# Patient Record
Sex: FEMALE | Race: WHITE | NOT HISPANIC OR LATINO | Employment: FULL TIME | URBAN - METROPOLITAN AREA
[De-identification: names, ages, dates, MRNs, and addresses within clinical notes are randomized per-mention and may not be internally consistent; named-entity substitution may affect disease eponyms.]

---

## 2017-02-20 ENCOUNTER — TELEPHONE (OUTPATIENT)
Dept: INTERNAL MEDICINE | Facility: CLINIC | Age: 47
End: 2017-02-20

## 2017-02-20 DIAGNOSIS — Z00.00 ROUTINE GENERAL MEDICAL EXAMINATION AT A HEALTH CARE FACILITY: Primary | ICD-10-CM

## 2017-02-20 NOTE — TELEPHONE ENCOUNTER
----- Message from Russell Woods sent at 2/20/2017 12:50 PM CST -----  Contact: self   Doctor appointment and lab have been scheduled.  Please link lab orders to the lab appointment.  Date of doctor appointment:  06/14  Physical or EP:  Physical   Date of lab appointment:  06/09  Comments:

## 2017-02-21 ENCOUNTER — OFFICE VISIT (OUTPATIENT)
Dept: INTERNAL MEDICINE | Facility: CLINIC | Age: 47
End: 2017-02-21
Payer: COMMERCIAL

## 2017-02-21 VITALS
WEIGHT: 179.44 LBS | OXYGEN SATURATION: 98 % | DIASTOLIC BLOOD PRESSURE: 84 MMHG | BODY MASS INDEX: 30.63 KG/M2 | TEMPERATURE: 99 F | HEIGHT: 64 IN | SYSTOLIC BLOOD PRESSURE: 132 MMHG | HEART RATE: 89 BPM

## 2017-02-21 DIAGNOSIS — I10 ESSENTIAL HYPERTENSION: ICD-10-CM

## 2017-02-21 DIAGNOSIS — J20.9 ACUTE BRONCHITIS, UNSPECIFIED ORGANISM: Primary | ICD-10-CM

## 2017-02-21 PROCEDURE — 96372 THER/PROPH/DIAG INJ SC/IM: CPT | Mod: S$GLB,,, | Performed by: FAMILY MEDICINE

## 2017-02-21 PROCEDURE — 99214 OFFICE O/P EST MOD 30 MIN: CPT | Mod: 25,S$GLB,, | Performed by: FAMILY MEDICINE

## 2017-02-21 PROCEDURE — 3079F DIAST BP 80-89 MM HG: CPT | Mod: S$GLB,,, | Performed by: FAMILY MEDICINE

## 2017-02-21 PROCEDURE — 1160F RVW MEDS BY RX/DR IN RCRD: CPT | Mod: S$GLB,,, | Performed by: FAMILY MEDICINE

## 2017-02-21 PROCEDURE — 3075F SYST BP GE 130 - 139MM HG: CPT | Mod: S$GLB,,, | Performed by: FAMILY MEDICINE

## 2017-02-21 PROCEDURE — 99999 PR PBB SHADOW E&M-EST. PATIENT-LVL III: CPT | Mod: PBBFAC,,, | Performed by: FAMILY MEDICINE

## 2017-02-21 RX ORDER — PROMETHAZINE HYDROCHLORIDE AND DEXTROMETHORPHAN HYDROBROMIDE 6.25; 15 MG/5ML; MG/5ML
5 SYRUP ORAL EVERY 4 HOURS PRN
Qty: 240 ML | Refills: 0 | Status: SHIPPED | OUTPATIENT
Start: 2017-02-21 | End: 2017-03-03

## 2017-02-21 RX ORDER — TRIAMCINOLONE ACETONIDE 40 MG/ML
40 INJECTION, SUSPENSION INTRA-ARTICULAR; INTRAMUSCULAR
Status: COMPLETED | OUTPATIENT
Start: 2017-02-21 | End: 2017-02-21

## 2017-02-21 RX ORDER — AZITHROMYCIN 250 MG/1
TABLET, FILM COATED ORAL
Qty: 6 TABLET | Refills: 0 | Status: SHIPPED | OUTPATIENT
Start: 2017-02-21 | End: 2017-06-14 | Stop reason: ALTCHOICE

## 2017-02-21 RX ORDER — METHYLPREDNISOLONE 4 MG/1
TABLET ORAL
Qty: 1 PACKAGE | Refills: 1 | Status: SHIPPED | OUTPATIENT
Start: 2017-02-21 | End: 2017-03-14

## 2017-02-21 RX ADMIN — TRIAMCINOLONE ACETONIDE 40 MG: 40 INJECTION, SUSPENSION INTRA-ARTICULAR; INTRAMUSCULAR at 04:02

## 2017-02-21 NOTE — PROGRESS NOTES
Subjective:   Patient ID: Ruth Harper is a 46 y.o. female.    Chief Complaint: Sore Throat (coughing, sore throat; ; flu / strep going around)    45 yo female with essential hypertension here with sore throat and productive cough for about 2 weeks.    URI    This is a new problem. The current episode started 1 to 4 weeks ago. The problem has been gradually worsening. There has been no fever. The fever has been present for less than 1 day. Associated symptoms include chest pain, coughing, headaches, nausea, a plugged ear sensation, rhinorrhea, sinus pain, sneezing and a sore throat. Pertinent negatives include no abdominal pain, congestion, diarrhea, dysuria, ear pain, joint pain, joint swelling, neck pain, rash, swollen glands, vomiting or wheezing. She has tried acetaminophen for the symptoms. The treatment provided no relief.       Patient queried and denies any further complaints.    ALLERGIES AND MEDICATIONS: updated and reviewed.  Review of patient's allergies indicates:   Allergen Reactions    Penicillins Hives       Current Outpatient Prescriptions:     azithromycin (Z-ELISHA) 250 MG tablet, Take 2 tablets by mouth on day 1; Take 1 tablet by mouth on days 2-5, Disp: 6 tablet, Rfl: 0    losartan (COZAAR) 100 MG tablet, TAKE 1 TABLET BY MOUTH DAILY, Disp: 90 tablet, Rfl: 7    methylPREDNISolone (MEDROL DOSEPACK) 4 mg tablet, use as directed, Disp: 1 Package, Rfl: 1    promethazine-dextromethorphan (PROMETHAZINE-DM) 6.25-15 mg/5 mL Syrp, Take 5 mLs by mouth every 4 (four) hours as needed. Do not take and drive. Do not take while working., Disp: 240 mL, Rfl: 0  No current facility-administered medications for this visit.     Review of Systems   Constitutional: Positive for fatigue. Negative for chills and fever.   HENT: Positive for rhinorrhea, sneezing and sore throat. Negative for congestion and ear pain.    Eyes: Negative for pain and redness.   Respiratory: Positive for cough. Negative  "for wheezing.    Cardiovascular: Positive for chest pain.   Gastrointestinal: Positive for nausea. Negative for abdominal pain, diarrhea and vomiting.   Endocrine: Positive for polydipsia, polyphagia and polyuria.   Genitourinary: Negative for dysuria and flank pain.   Musculoskeletal: Negative for joint pain, neck pain and neck stiffness.   Skin: Negative for rash and wound.   Neurological: Positive for headaches. Negative for light-headedness and numbness.   Hematological: Negative for adenopathy. Does not bruise/bleed easily.   Psychiatric/Behavioral: Negative for behavioral problems and confusion.       Objective:     Vitals:    02/21/17 1547   BP: 132/84   Pulse: 89   Temp: 98.5 °F (36.9 °C)   TempSrc: Oral   SpO2: 98%   Weight: 81.4 kg (179 lb 7.3 oz)   Height: 5' 4" (1.626 m)   PainSc:   7   PainLoc: Throat     Body mass index is 30.8 kg/(m^2).    Physical Exam   Constitutional: She is oriented to person, place, and time. She appears well-developed and well-nourished. She is cooperative. She does not have a sickly appearance. No distress.   HENT:   Head: Normocephalic and atraumatic.   Right Ear: Hearing, tympanic membrane, external ear and ear canal normal. No tenderness.   Left Ear: Hearing, tympanic membrane, external ear and ear canal normal. No tenderness.   Nose: Nose normal.   Mouth/Throat: Oropharynx is clear and moist. Normal dentition. No oropharyngeal exudate, posterior oropharyngeal edema or posterior oropharyngeal erythema.   Eyes: Conjunctivae and lids are normal. Right eye exhibits no discharge. Left eye exhibits no discharge. Right conjunctiva is not injected. Left conjunctiva is not injected. No scleral icterus. Right eye exhibits normal extraocular motion. Left eye exhibits normal extraocular motion.   Neck: Normal range of motion. Neck supple. No JVD present. Carotid bruit is not present. No tracheal deviation and no edema present. No thyromegaly present.   Cardiovascular: Normal rate, " regular rhythm, normal heart sounds and normal pulses.  Exam reveals no friction rub.    No murmur heard.  Pulmonary/Chest: Effort normal. No accessory muscle usage. No respiratory distress. She has wheezes in the right lower field and the left lower field. She has no rhonchi. She has no rales.           Musculoskeletal: She exhibits no edema.   Lymphadenopathy:        Head (right side): No submandibular adenopathy present.        Head (left side): No submandibular adenopathy present.     She has no cervical adenopathy.   Neurological: She is alert and oriented to person, place, and time.   Skin: Skin is warm and dry. She is not diaphoretic.   Psychiatric: Her speech is normal and behavior is normal. Thought content normal. Her mood appears not anxious. Her affect is not angry, not labile and not inappropriate. She does not exhibit a depressed mood.       Assessment and Plan:   Ruth was seen today for sore throat.    Diagnoses and all orders for this visit:    Acute bronchitis, unspecified organism  -     triamcinolone acetonide injection 40 mg; Inject 1 mL (40 mg total) into the muscle one time.  -     azithromycin (Z-ELISHA) 250 MG tablet; Take 2 tablets by mouth on day 1; Take 1 tablet by mouth on days 2-5  -     promethazine-dextromethorphan (PROMETHAZINE-DM) 6.25-15 mg/5 mL Syrp; Take 5 mLs by mouth every 4 (four) hours as needed. Do not take and drive. Do not take while working.  -     methylPREDNISolone (MEDROL DOSEPACK) 4 mg tablet; use as directed    Hydrate, rest, OTC Mucinex as directed, Nasal saline as needed.  OTC Zyrtec as directed.    Essential hypertension  Stable, cont losartan 100mg daily          Return in about 3 months (around 5/21/2017).    THIS NOTE WILL BE SHARED WITH THE PATIENT.

## 2017-02-21 NOTE — PATIENT INSTRUCTIONS
What Is Acute Bronchitis?  Acute or short-term bronchitis last for days or weeks. It occurs when the bronchial tubes (airways in the lungs) are irritated by a virus, bacteria, or allergen. This causes a cough that produces yellow or greenish mucus.  Inside healthy lungs    Air travels in and out of the lungs through the airways. The linings of these airways produce sticky mucus. This mucus traps particles that enter the lungs. Tiny structures called cilia then sweep the particles out of the airways.     Healthy airway: Airways are normally open. Air moves in and out easily.      Healthy cilia: Tiny, hairlike cilia sweep mucus and particles up and out of the airways.   Lungs with bronchitis  Bronchitis often occurs with a cold or the flu virus. The airways become inflamed (red and swollen). There is a deep hacking cough from the extra mucus. Other symptoms may include:  · Wheezing  · Chest discomfort  · Shortness of breath  · Mild fever  A second infection, this time due to bacteria, may then occur. And airways irritated by allergens or smoke are more likely to get infected.        Inflamed airway: Inflammation and extra mucus narrow the airway, causing shortness of breath.      Impaired cilia: Extra mucus impairs cilia, causing congestion and wheezing. Smoking makes the problem worse.   Making a diagnosis  A physical exam, health history, and certain tests help your healthcare provider make the diagnosis.  Health history  Your healthcare provider will ask you about your symptoms.  The exam  Your provider listens to your chest for signs of congestion. He or she may also check your ears, nose, and throat.  Possible tests  · A sputum test for bacteria. This requires a sample of mucus from the lungs.  · A nasal or throat swab for bacterial infection.  · A chest X-ray if your healthcare provider thinks you have pneumonia.  · Tests to check for an underlying condition, such as allergies, asthma, or COPD. You may need  to see a specialist for more lung function testing.  Treating a cough  The main treatment for bronchitis is easing symptoms. Avoiding smoke, allergens, and other things that trigger coughing can often help. If the infection is bacterial, you may be given antibiotics. During the illness, it's important to get plenty of sleep. To ease symptoms:  · Dont smoke, and avoid secondhand smoke.  · Use a humidifier, or breathe in steam from a hot shower. This may help loosen mucus.  · Drink a lot of water and juice. They can soothe the throat and may help thin mucus.  · Sit up or use extra pillows when in bed to help lessen coughing and congestion.  · Ask your provider about using cough medicine, pain and fever medicine, or a decongestant.  Antibiotics  Most cases of bronchitis are caused by cold or flu viruses. Antibiotics dont treat viral illness. Taking antibiotics when they are not needed increases your risk of getting an infection later that is antibiotic-resistant. Your provider will prescribe antibiotics if the infection is caused by bacteria. If they are prescribed:  · Take the medicine until it is used up, even if symptoms have improved. If you dont, the bronchitis may come back.  · Take them as directed. For instance, some medicines should be taken with food.  · Ask your provider or pharmacist what side effects are common, and what to do about them.  Follow-up care  You should see your provider again in 2 to 3 weeks. By this time, symptoms should have improved. An infection that lasts longer may mean you have a more serious problem.  Prevention  · Avoid tobacco smoke. If you smoke, quit. Stay away from smoky places. Ask friends and family not to smoke around you, or in your home or car.  · Get checked for allergies.  · Ask your provider about getting a yearly flu shot, and pneumococcal or pneumonia shots.  · Wash your hands often. This helps reduce the chance of picking up viruses that cause colds and flu.  Call  your healthcare provider if:  · Symptoms worsen, or new symptoms develop.  · Breathing problems worsen or  become severe.  · Symptoms dont get better within a week, or within 3 days of taking antibiotics.   Date Last Reviewed: 6/18/2014  © 7367-0071 [employer]. [address] [place] [address], [place] [address]. All rights reserved. This information is not intended as a substitute for professional medical care. Always follow your healthcare professional's instructions.

## 2017-02-21 NOTE — MR AVS SNAPSHOT
[facility]   [facility] [address]  Phone: [phone]  Fax: [phone]                  Ruth Harper   2017 3:40 PM   Office Visit    Description:  Female : 1970   Provider:  Joaquim Miller MD   Department:  [facility]           Reason for Visit     Sore Throat           Diagnoses this Visit        Comments    Acute bronchitis, unspecified organism    -  Primary     Essential hypertension                To Do List           Future Appointments        Provider Department Dept Phone    2017 8:00 AM LAB, [place] [phone]    2017 8:15 AM SPECIMEN, [place] [facility] [phone]    2017 3:40 PM Montse Harris MD [facility] [phone]      Goals (5 Years of Data)     None      Follow-Up and Disposition     Return in about 3 months (around 2017).       These Medications        Disp Refills Start End    azithromycin (Z-ELISHA) 250 MG tablet 6 tablet 0 2017     Take 2 tablets by mouth on day 1; Take 1 tablet by mouth on days 2-5    Pharmacy: [facility] [address] [place] [address] Ph #: [phone]       promethazine-dextromethorphan (PROMETHAZINE-DM) 6.25-15 mg/5 mL Syrp 240 mL 0 2017 3/3/2017    Take 5 mLs by mouth every 4 (four) hours as needed. Do not take and drive. Do not take while working. - Oral    Pharmacy: [facility] [address] [place] [address] [address] Ph #: [phone]       methylPREDNISolone (MEDROL DOSEPACK) 4 mg tablet 1 Package 1 2017 3/14/2017    use as directed    Pharmacy: [facility] [address] [place] [address] [place] - [address] [place] [address] Ph #: [phone]         [facility] On Call     [facility] On Call Nurse Care Line -  Assistance  Registered nurses in the [facility] On Call Center provide clinical advisement, health education, appointment booking, and other advisory  services.  Call for this free service at [phone].             Medications           Message regarding Medications     Verify the changes and/or additions to your medication regime listed below are the same as discussed with your clinician today.  If any of these changes or additions are incorrect, please notify your healthcare provider.        START taking these NEW medications        Refills    azithromycin (Z-ELISHA) 250 MG tablet 0    Sig: Take 2 tablets by mouth on day 1; Take 1 tablet by mouth on days 2-5    Class: Normal    promethazine-dextromethorphan (PROMETHAZINE-DM) 6.25-15 mg/5 mL Syrp 0    Sig: Take 5 mLs by mouth every 4 (four) hours as needed. Do not take and drive. Do not take while working.    Class: Normal    Route: Oral    methylPREDNISolone (MEDROL DOSEPACK) 4 mg tablet 1    Sig: use as directed    Class: Normal      These medications were administered today        Dose Freq    triamcinolone acetonide injection 40 mg 40 mg Clinic/[facility]OD 1 time    Sig: Inject 1 mL (40 mg total) into the muscle one time.    Class: Normal    Route: Intramuscular      STOP taking these medications     ergocalciferol (ERGOCALCIFEROL) 50,000 unit Cap Take 1 capsule (50,000 Units total) by mouth every 7 days.           Verify that the below list of medications is an accurate representation of the medications you are currently taking.  If none reported, the list may be blank. If incorrect, please contact your healthcare provider. Carry this list with you in case of emergency.           Current Medications     azithromycin (Z-ELISHA) 250 MG tablet Take 2 tablets by mouth on day 1; Take 1 tablet by mouth on days 2-5    losartan (COZAAR) 100 MG tablet TAKE 1 TABLET BY MOUTH DAILY    methylPREDNISolone (MEDROL DOSEPACK) 4 mg tablet use as directed    promethazine-dextromethorphan (PROMETHAZINE-DM) 6.25-15 mg/5 mL Syrp Take 5 mLs by mouth every 4 (four) hours as needed. Do not take and drive. Do not take while working.          "  Clinical Reference Information           Your Vitals Were     BP Pulse Temp Height    132/84 (BP Location: Right arm, Patient Position: Sitting, BP Method: Manual) 89 98.5 °F (36.9 °C) (Oral) 5' 4" (1.626 m)    Weight Last Period SpO2 BMI    81.4 kg (179 lb 7.3 oz) 02/07/2017 (Exact Date) 98% 30.8 kg/m2      Blood Pressure          Most Recent Value    BP  132/84      Allergies as of 2/21/2017     Penicillins      Immunizations Administered on Date of Encounter - 2/21/2017     None      Administrations This Visit     triamcinolone acetonide injection 40 mg     Admin Date Action Dose Route Administered By             02/21/2017 Given 40 mg Intramuscular Fannie Desouza LPN                      [facility] Sign-Up     Activating your [facility] account is as easy as 1-2-3!     1) Visit [email or web address], select Sign Up Now, enter this activation code and your date of birth, then select Next.  [email or web address]  Expires: 4/1/2017  5:54 AM      2) Create a username and password to use when you visit [facility] in the future and select a security question in case you lose your password and select Next.    3) Enter your e-mail address and click Sign Up!    Additional Information  If you have questions, please e-mail [email or web address] or call [phone] to talk to our [facility] staff. Remember, [facility] is NOT to be used for urgent needs. For medical emergencies, dial 911.         Instructions      What Is Acute Bronchitis?  Acute or short-term bronchitis last for days or weeks. It occurs when the bronchial tubes (airways in the lungs) are irritated by a virus, bacteria, or allergen. This causes a cough that produces yellow or greenish mucus.  Inside healthy lungs    Air travels in and out of the lungs through the airways. The linings of these airways produce sticky mucus. This mucus traps particles that enter the lungs. Tiny structures called cilia then sweep the particles out of the airways.     Healthy airway: " Airways are normally open. Air moves in and out easily.      Healthy cilia: Tiny, hairlike cilia sweep mucus and particles up and out of the airways.   Lungs with bronchitis  Bronchitis often occurs with a cold or the flu virus. The airways become inflamed (red and swollen). There is a deep hacking cough from the extra mucus. Other symptoms may include:  · Wheezing  · Chest discomfort  · Shortness of breath  · Mild fever  A second infection, this time due to bacteria, may then occur. And airways irritated by allergens or smoke are more likely to get infected.        Inflamed airway: Inflammation and extra mucus narrow the airway, causing shortness of breath.      Impaired cilia: Extra mucus impairs cilia, causing congestion and wheezing. Smoking makes the problem worse.   Making a diagnosis  A physical exam, health history, and certain tests help your healthcare provider make the diagnosis.  Health history  Your healthcare provider will ask you about your symptoms.  The exam  Your provider listens to your chest for signs of congestion. He or she may also check your ears, nose, and throat.  Possible tests  · A sputum test for bacteria. This requires a sample of mucus from the lungs.  · A nasal or throat swab for bacterial infection.  · A chest X-ray if your healthcare provider thinks you have pneumonia.  · Tests to check for an underlying condition, such as allergies, asthma, or COPD. You may need to see a specialist for more lung function testing.  Treating a cough  The main treatment for bronchitis is easing symptoms. Avoiding smoke, allergens, and other things that trigger coughing can often help. If the infection is bacterial, you may be given antibiotics. During the illness, it's important to get plenty of sleep. To ease symptoms:  · Dont smoke, and avoid secondhand smoke.  · Use a humidifier, or breathe in steam from a hot shower. This may help loosen mucus.  · Drink a lot of water and juice. They can soothe  the throat and may help thin mucus.  · Sit up or use extra pillows when in bed to help lessen coughing and congestion.  · Ask your provider about using cough medicine, pain and fever medicine, or a decongestant.  Antibiotics  Most cases of bronchitis are caused by cold or flu viruses. Antibiotics dont treat viral illness. Taking antibiotics when they are not needed increases your risk of getting an infection later that is antibiotic-resistant. Your provider will prescribe antibiotics if the infection is caused by bacteria. If they are prescribed:  · Take the medicine until it is used up, even if symptoms have improved. If you dont, the bronchitis may come back.  · Take them as directed. For instance, some medicines should be taken with food.  · Ask your provider or pharmacist what side effects are common, and what to do about them.  Follow-up care  You should see your provider again in 2 to 3 weeks. By this time, symptoms should have improved. An infection that lasts longer may mean you have a more serious problem.  Prevention  · Avoid tobacco smoke. If you smoke, quit. Stay away from smoky places. Ask friends and family not to smoke around you, or in your home or car.  · Get checked for allergies.  · Ask your provider about getting a yearly flu shot, and pneumococcal or pneumonia shots.  · Wash your hands often. This helps reduce the chance of picking up viruses that cause colds and flu.  Call your healthcare provider if:  · Symptoms worsen, or new symptoms develop.  · Breathing problems worsen or  become severe.  · Symptoms dont get better within a week, or within 3 days of taking antibiotics.   Date Last Reviewed: 6/18/2014 © 2000-2016 [employer]. [address] [place] [address], [place] [address]. All rights reserved. This information is not intended as a substitute for professional medical care. Always follow your healthcare professional's instructions.             Language Assistance Services      ATTENTION: Language assistance services are available, free of charge. Please call [phone].      ATENCIÓN: Si habla stephanie, tiene a pritchett disposición servicios gratuitos de asistencia lingüística. Llame al [phone].     CHÚ Ý: N?u b?n nói Ti?ng Vi?t, có các d?ch v? h? tr? ngôn ng? mi?n phí dành cho b?n. G?i s? [phone].         [facility] complies with applicable Federal civil rights laws and does not discriminate on the basis of race, color, national origin, age, disability, or sex.

## 2017-06-09 ENCOUNTER — LAB VISIT (OUTPATIENT)
Dept: LAB | Facility: HOSPITAL | Age: 47
End: 2017-06-09
Attending: INTERNAL MEDICINE
Payer: COMMERCIAL

## 2017-06-09 DIAGNOSIS — Z00.00 ROUTINE GENERAL MEDICAL EXAMINATION AT A HEALTH CARE FACILITY: ICD-10-CM

## 2017-06-09 LAB
25(OH)D3+25(OH)D2 SERPL-MCNC: 13 NG/ML
ALBUMIN SERPL BCP-MCNC: 3.7 G/DL
ALP SERPL-CCNC: 50 U/L
ALT SERPL W/O P-5'-P-CCNC: 15 U/L
ANION GAP SERPL CALC-SCNC: 9 MMOL/L
AST SERPL-CCNC: 14 U/L
BASOPHILS # BLD AUTO: 0.04 K/UL
BASOPHILS NFR BLD: 0.7 %
BILIRUB SERPL-MCNC: 0.7 MG/DL
BUN SERPL-MCNC: 17 MG/DL
CALCIUM SERPL-MCNC: 9.7 MG/DL
CHLORIDE SERPL-SCNC: 106 MMOL/L
CHOLEST/HDLC SERPL: 4.4 {RATIO}
CO2 SERPL-SCNC: 23 MMOL/L
CREAT SERPL-MCNC: 0.8 MG/DL
DIFFERENTIAL METHOD: NORMAL
EOSINOPHIL # BLD AUTO: 0.2 K/UL
EOSINOPHIL NFR BLD: 3.4 %
ERYTHROCYTE [DISTWIDTH] IN BLOOD BY AUTOMATED COUNT: 14.2 %
EST. GFR  (AFRICAN AMERICAN): >60 ML/MIN/1.73 M^2
EST. GFR  (NON AFRICAN AMERICAN): >60 ML/MIN/1.73 M^2
GLUCOSE SERPL-MCNC: 93 MG/DL
HCT VFR BLD AUTO: 41.4 %
HDL/CHOLESTEROL RATIO: 22.6 %
HDLC SERPL-MCNC: 239 MG/DL
HDLC SERPL-MCNC: 54 MG/DL
HGB BLD-MCNC: 13.5 G/DL
LDLC SERPL CALC-MCNC: 161 MG/DL
LYMPHOCYTES # BLD AUTO: 1.7 K/UL
LYMPHOCYTES NFR BLD: 27 %
MCH RBC QN AUTO: 29.7 PG
MCHC RBC AUTO-ENTMCNC: 32.6 %
MCV RBC AUTO: 91 FL
MONOCYTES # BLD AUTO: 0.5 K/UL
MONOCYTES NFR BLD: 8.3 %
NEUTROPHILS # BLD AUTO: 3.7 K/UL
NEUTROPHILS NFR BLD: 60.6 %
NONHDLC SERPL-MCNC: 185 MG/DL
PLATELET # BLD AUTO: 329 K/UL
PMV BLD AUTO: 11.3 FL
POTASSIUM SERPL-SCNC: 4.6 MMOL/L
PROT SERPL-MCNC: 7.1 G/DL
RBC # BLD AUTO: 4.54 M/UL
SODIUM SERPL-SCNC: 138 MMOL/L
TRIGL SERPL-MCNC: 120 MG/DL
TSH SERPL DL<=0.005 MIU/L-ACNC: 3.44 UIU/ML
WBC # BLD AUTO: 6.14 K/UL

## 2017-06-09 PROCEDURE — 84443 ASSAY THYROID STIM HORMONE: CPT

## 2017-06-09 PROCEDURE — 80053 COMPREHEN METABOLIC PANEL: CPT

## 2017-06-09 PROCEDURE — 82306 VITAMIN D 25 HYDROXY: CPT

## 2017-06-09 PROCEDURE — 80061 LIPID PANEL: CPT

## 2017-06-09 PROCEDURE — 85025 COMPLETE CBC W/AUTO DIFF WBC: CPT | Mod: PO

## 2017-06-09 PROCEDURE — 36415 COLL VENOUS BLD VENIPUNCTURE: CPT | Mod: PO

## 2017-06-14 ENCOUNTER — OFFICE VISIT (OUTPATIENT)
Dept: INTERNAL MEDICINE | Facility: CLINIC | Age: 47
End: 2017-06-14
Payer: COMMERCIAL

## 2017-06-14 VITALS
BODY MASS INDEX: 29.57 KG/M2 | WEIGHT: 177.5 LBS | HEIGHT: 65 IN | SYSTOLIC BLOOD PRESSURE: 110 MMHG | TEMPERATURE: 99 F | DIASTOLIC BLOOD PRESSURE: 80 MMHG | HEART RATE: 84 BPM

## 2017-06-14 DIAGNOSIS — Z00.00 ROUTINE MEDICAL EXAM: Primary | ICD-10-CM

## 2017-06-14 PROCEDURE — 99999 PR PBB SHADOW E&M-EST. PATIENT-LVL III: CPT | Mod: PBBFAC,,, | Performed by: INTERNAL MEDICINE

## 2017-06-14 PROCEDURE — 99396 PREV VISIT EST AGE 40-64: CPT | Mod: S$GLB,,, | Performed by: INTERNAL MEDICINE

## 2017-06-14 RX ORDER — BUTALBITAL, ACETAMINOPHEN AND CAFFEINE 50; 325; 40 MG/1; MG/1; MG/1
1 TABLET ORAL EVERY 6 HOURS PRN
Qty: 30 TABLET | Refills: 3 | Status: SHIPPED | OUTPATIENT
Start: 2017-06-14 | End: 2019-03-18 | Stop reason: SDUPTHER

## 2017-06-14 RX ORDER — ERGOCALCIFEROL 1.25 MG/1
50000 CAPSULE ORAL
Qty: 12 CAPSULE | Refills: 0 | Status: SHIPPED | OUTPATIENT
Start: 2017-06-14 | End: 2017-09-05 | Stop reason: SDUPTHER

## 2017-06-14 NOTE — PROGRESS NOTES
The patient is a 46 y.o. old female who presents to the office for a physical.  Review of labs reveals an elevated cholesterol and low vitamin D.     PAST MEDICAL HISTORY  Past Medical History:   Diagnosis Date    AR (allergic rhinitis)     HTN (hypertension)     Hyperlipidemia        SURGICAL HISTORY:  Past Surgical History:   Procedure Laterality Date    none           MEDS:  Medcard reviewed and updated    ALLERGIES: Allergy Card reviewed and updated    SOCIAL HISTORY:   The patient is a nonsmoker, denies alcohol or illicit drug use.    ROS:  GENERAL: No fever, chills, fatigability or weight loss.  SKIN: No rashes.  HEAD: Occasional migraine headaches.  Denies recent head trauma.  EYES: No photophobia, ocular pain or diplopia.  EARS: Denies ear pain, discharge or vertigo.  NOSE: No epistaxis.  Positive postnasal drip.  MOUTH & THROAT: No hoarseness or change in voice.   NODES: Denies swollen glands.  CHEST: Denies shortness of breath, wheezing, cough and sputum production.  CARDIOVASCULAR: Denies chest pain or palpitations.  ABDOMEN: Appetite fine. Denies diarrhea, abdominal pain, constipation or blood in stool.  URINARY: No dysuria or hematuria.  MUSCULOSKELETAL: No joint stiffness or swelling. Denies back pain.  NEUROLOGIC: No history of seizures.  ENDOCRINE: Denies polyuria or polydipsia.  PSYCHIATRIC: Denies mood swings, depression, anxiety, homicidal or suicidal thoughts.    SCREENINGS:  Last cholesterol: June 2017  Last colonoscopy: none  Last mammogram: 2017  Last Pap smear: 2017  Last tetanus: unknown  Last Pneumovax: none  Last eye exam: about 6 months ago  Last bone density: none  Last menstrual period: June 12, 2017    PE:   Vitals:  Vitals:    06/14/17 1531   BP: 110/80   Pulse: 84   Temp: 98.5 °F (36.9 °C)       APPEARANCE: Well nourished, well developed, in no acute distress.    EYES: Sclerae anicteric. PERRL. EOMI.      EARS: TM's intact. No retraction or perforation.    NOSE: Mucosa pink.  Airway clear.  MOUTH & THROAT: No tonsillar enlargement. No pharyngeal erythema or exudate. No stridor.  NECK: Supple, no thyromegaly.  CHEST: Lungs clear to auscultation with unlabored respirations.  CARDIOVASCULAR: Normal S1, S2. No murmurs. No carotid bruits. No pedal edema.  ABDOMEN: Bowel sounds normal. Not distended. Soft. No tenderness or masses. No organomegaly.  MUSCULOSKELETAL:  Normal gait, no cyanosis or clubbing.   SKIN: Normal skin turgor, warm and dry.  NEUROLOGIC: Cranial Nerves: Intact.  PSYCHIATRIC: The patient is oriented to person, place, and time and has a pleasant affect.        ASSESSMENT/PLAN:  Ruth was seen today for annual exam.    Diagnoses and all orders for this visit:    Routine medical exam  -     Comprehensive metabolic panel; Future  -     Lipid panel; Future  -     TSH; Future  -     Vitamin D; Future  -     Replace vitamin D  -     Encourage healthy diet and regular exercise  -     10 year ASCVD risk is less than 5%  -      Encourage healthy diet and regular exercise    Other orders  -     butalbital-acetaminophen-caffeine -40 mg (FIORICET, ESGIC) -40 mg per tablet; Take 1 tablet by mouth every 6 (six) hours as needed for Pain.  -     ergocalciferol (ERGOCALCIFEROL) 50,000 unit Cap; Take 1 capsule (50,000 Units total) by mouth every 7 days.

## 2017-09-05 RX ORDER — ERGOCALCIFEROL 1.25 MG/1
CAPSULE ORAL
Qty: 12 CAPSULE | Refills: 0 | Status: SHIPPED | OUTPATIENT
Start: 2017-09-05 | End: 2017-12-08 | Stop reason: SDUPTHER

## 2017-09-11 RX ORDER — LOSARTAN POTASSIUM 100 MG/1
TABLET ORAL
Qty: 90 TABLET | Refills: 1 | Status: SHIPPED | OUTPATIENT
Start: 2017-09-11 | End: 2018-06-17 | Stop reason: SDUPTHER

## 2017-12-08 RX ORDER — ERGOCALCIFEROL 1.25 MG/1
CAPSULE ORAL
Qty: 12 CAPSULE | Refills: 0 | Status: SHIPPED | OUTPATIENT
Start: 2017-12-08 | End: 2018-03-09 | Stop reason: SDUPTHER

## 2018-01-08 ENCOUNTER — OFFICE VISIT (OUTPATIENT)
Dept: INTERNAL MEDICINE | Facility: CLINIC | Age: 48
End: 2018-01-08
Payer: COMMERCIAL

## 2018-01-08 VITALS
RESPIRATION RATE: 10 BRPM | HEIGHT: 65 IN | DIASTOLIC BLOOD PRESSURE: 70 MMHG | BODY MASS INDEX: 30.16 KG/M2 | SYSTOLIC BLOOD PRESSURE: 120 MMHG | WEIGHT: 181 LBS | HEART RATE: 70 BPM

## 2018-01-08 DIAGNOSIS — R51.9 NONINTRACTABLE HEADACHE, UNSPECIFIED CHRONICITY PATTERN, UNSPECIFIED HEADACHE TYPE: Primary | ICD-10-CM

## 2018-01-08 PROCEDURE — 99999 PR PBB SHADOW E&M-EST. PATIENT-LVL III: CPT | Mod: PBBFAC,,, | Performed by: INTERNAL MEDICINE

## 2018-01-08 PROCEDURE — 99214 OFFICE O/P EST MOD 30 MIN: CPT | Mod: S$GLB,,, | Performed by: INTERNAL MEDICINE

## 2018-01-08 RX ORDER — METHOCARBAMOL 500 MG/1
500 TABLET, FILM COATED ORAL 3 TIMES DAILY PRN
Qty: 30 TABLET | Refills: 0 | Status: SHIPPED | OUTPATIENT
Start: 2018-01-08 | End: 2018-01-18

## 2018-01-08 RX ORDER — NAPROXEN 500 MG/1
500 TABLET ORAL 2 TIMES DAILY
Qty: 60 TABLET | Refills: 1 | Status: SHIPPED | OUTPATIENT
Start: 2018-01-08 | End: 2018-02-01

## 2018-01-08 NOTE — PROGRESS NOTES
Subjective:       Patient ID: Ruth Harper is a 47 y.o. female.    Chief Complaint: Headache and Nasal Congestion    HPI     47-year-old female here for evaluation of headache and congestion.  She started with a headache two days ago.  She takes migraine medication.  It did not feel like a typical migraine.  It was all on her left side going into her neck.  It felt different from her usual headaches.  She has had some pain in her left neck the last couple of days.  She has more stress with work than usual.  She has had some congestion, but not more than usual for living in the area.    Review of Systems   Constitutional: Negative for activity change and unexpected weight change.   HENT: Negative for hearing loss, rhinorrhea and trouble swallowing.    Eyes: Negative for discharge and visual disturbance.   Respiratory: Negative for chest tightness and wheezing.    Cardiovascular: Negative for chest pain and palpitations.   Gastrointestinal: Negative for blood in stool, constipation, diarrhea and vomiting.   Endocrine: Negative for polydipsia and polyuria.   Genitourinary: Negative for difficulty urinating, dysuria, hematuria and menstrual problem.   Musculoskeletal: Positive for neck pain. Negative for arthralgias and joint swelling.   Neurological: Positive for headaches. Negative for weakness.   Psychiatric/Behavioral: Negative for confusion and dysphoric mood.       Objective:      Physical Exam   Constitutional: She is oriented to person, place, and time. She appears well-developed and well-nourished.   HENT:   Head: Normocephalic and atraumatic.   Mouth/Throat: No oropharyngeal exudate.   Eyes: EOM are normal. Pupils are equal, round, and reactive to light. Right eye exhibits no discharge. Left eye exhibits no discharge. No scleral icterus.   Neck: Normal range of motion. Neck supple. No tracheal deviation present. No thyromegaly present.   Cardiovascular: Normal rate, regular rhythm and normal heart sounds.   Exam reveals no gallop and no friction rub.    No murmur heard.  Pulmonary/Chest: Effort normal and breath sounds normal. No respiratory distress. She has no wheezes. She has no rales. She exhibits no tenderness.   Abdominal: Soft. Bowel sounds are normal. She exhibits no distension and no mass. There is no tenderness. There is no rebound and no guarding.   Musculoskeletal: Normal range of motion. She exhibits no edema or tenderness.   Neurological: She is alert and oriented to person, place, and time.   Skin: Skin is warm and dry. No rash noted. No erythema. No pallor.   Psychiatric: She has a normal mood and affect. Her behavior is normal.   Vitals reviewed.      Assessment:       1. Nonintractable headache, unspecified chronicity pattern, unspecified headache type        Plan:       1.  Naproxen and Flexeril prescribed.  Likely secondary to tension from stress.

## 2018-01-11 ENCOUNTER — PATIENT MESSAGE (OUTPATIENT)
Dept: INTERNAL MEDICINE | Facility: CLINIC | Age: 48
End: 2018-01-11

## 2018-01-11 ENCOUNTER — TELEPHONE (OUTPATIENT)
Dept: INTERNAL MEDICINE | Facility: CLINIC | Age: 48
End: 2018-01-11

## 2018-01-11 NOTE — TELEPHONE ENCOUNTER
----- Message from Elizabeth Hoyt MA sent at 1/11/2018  3:45 PM CST -----  Contact: Self [phone]  She saw Dr Turcios. Dr Harris has no appointments. Please have Dr Turcios advise/ Thanks  ----- Message -----  From: Aracelis Montes  Sent: 1/11/2018   8:18 AM  To: Kimberly GOMEZ Staff    Pt requesting a call back. Stated she has been having an ongoing headache since Sunday, was seen by Dr Turcios and prescribed two medications still no relief. Please call and advise

## 2018-01-11 NOTE — TELEPHONE ENCOUNTER
----- Message from Russell Woods sent at 1/10/2018  9:58 AM CST -----  Contact: self   Patient would like to get medical advice.  Symptoms (please be specific):  Headache   How long has patient had these symptoms:  5 days   Pharmacy name and phone #:  [facility] [id number] - [phone] (Phone)  [phone] (Fax)  Any drug allergies:  Penicillins  Comments:Like to know if the doctor has any other suggestion regarding a test she may need to have for to see why she is having headaches

## 2018-01-12 RX ORDER — METHYLPREDNISOLONE 4 MG/1
TABLET ORAL
Qty: 21 TABLET | Refills: 0 | Status: SHIPPED | OUTPATIENT
Start: 2018-01-12 | End: 2018-02-01

## 2018-02-01 ENCOUNTER — OFFICE VISIT (OUTPATIENT)
Dept: INTERNAL MEDICINE | Facility: CLINIC | Age: 48
End: 2018-02-01
Payer: COMMERCIAL

## 2018-02-01 VITALS
SYSTOLIC BLOOD PRESSURE: 116 MMHG | BODY MASS INDEX: 29.65 KG/M2 | HEIGHT: 65 IN | HEART RATE: 78 BPM | DIASTOLIC BLOOD PRESSURE: 76 MMHG | RESPIRATION RATE: 16 BRPM | TEMPERATURE: 98 F | WEIGHT: 177.94 LBS

## 2018-02-01 DIAGNOSIS — J32.9 VIRAL SINUSITIS: Primary | ICD-10-CM

## 2018-02-01 DIAGNOSIS — B97.89 VIRAL SINUSITIS: Primary | ICD-10-CM

## 2018-02-01 DIAGNOSIS — I10 HTN, GOAL BELOW 130/80: ICD-10-CM

## 2018-02-01 PROCEDURE — 99214 OFFICE O/P EST MOD 30 MIN: CPT | Mod: 25,S$GLB,, | Performed by: INTERNAL MEDICINE

## 2018-02-01 PROCEDURE — 99999 PR PBB SHADOW E&M-EST. PATIENT-LVL III: CPT | Mod: PBBFAC,,, | Performed by: INTERNAL MEDICINE

## 2018-02-01 PROCEDURE — 3008F BODY MASS INDEX DOCD: CPT | Mod: S$GLB,,, | Performed by: INTERNAL MEDICINE

## 2018-02-01 PROCEDURE — 96372 THER/PROPH/DIAG INJ SC/IM: CPT | Mod: S$GLB,,, | Performed by: INTERNAL MEDICINE

## 2018-02-01 RX ORDER — LEVOCETIRIZINE DIHYDROCHLORIDE 5 MG/1
5 TABLET, FILM COATED ORAL NIGHTLY
Qty: 30 TABLET | Refills: 3 | Status: SHIPPED | OUTPATIENT
Start: 2018-02-01 | End: 2018-08-22

## 2018-02-01 RX ORDER — FLUTICASONE PROPIONATE 50 MCG
2 SPRAY, SUSPENSION (ML) NASAL DAILY
Qty: 16 G | Refills: 2 | Status: SHIPPED | OUTPATIENT
Start: 2018-02-01 | End: 2018-03-03

## 2018-02-01 RX ORDER — TRIAMCINOLONE ACETONIDE 40 MG/ML
40 INJECTION, SUSPENSION INTRA-ARTICULAR; INTRAMUSCULAR
Status: COMPLETED | OUTPATIENT
Start: 2018-02-01 | End: 2018-02-01

## 2018-02-01 RX ADMIN — TRIAMCINOLONE ACETONIDE 40 MG: 40 INJECTION, SUSPENSION INTRA-ARTICULAR; INTRAMUSCULAR at 03:02

## 2018-02-01 NOTE — PROGRESS NOTES
Subjective:       Patient ID: Ruth Harper is a 47 y.o. female.    Chief Complaint: Sinus Problem    HPI   Pt with HTN is here c/o 2 days of persistent sinus congestion, post nasal drip, runny nose. She denies any fevers/chills, cough, body aches. She has not tried any OTC medications for relief.   Review of Systems   Constitutional: Negative for activity change, appetite change, chills, diaphoresis, fatigue, fever and unexpected weight change.   HENT: Positive for congestion, postnasal drip, rhinorrhea, sinus pain, sinus pressure and sore throat. Negative for sneezing, trouble swallowing and voice change.    Respiratory: Negative for cough, shortness of breath and wheezing.    Cardiovascular: Negative for chest pain, palpitations and leg swelling.   Gastrointestinal: Negative for abdominal pain, blood in stool, constipation, diarrhea, nausea and vomiting.   Genitourinary: Negative for dysuria.   Musculoskeletal: Negative for arthralgias and myalgias.   Skin: Negative for rash and wound.   Allergic/Immunologic: Negative for environmental allergies and food allergies.   Hematological: Negative for adenopathy. Does not bruise/bleed easily.       Objective:      Physical Exam   Constitutional: She is oriented to person, place, and time. She appears well-developed and well-nourished. No distress.   HENT:   Head: Normocephalic and atraumatic.   Right Ear: External ear normal.   Left Ear: External ear normal.   Nose: Mucosal edema and rhinorrhea present.   Mouth/Throat: Oropharynx is clear and moist. No oropharyngeal exudate.   Eyes: Conjunctivae and EOM are normal. Pupils are equal, round, and reactive to light. Right eye exhibits no discharge. Left eye exhibits no discharge. No scleral icterus.   Neck: Neck supple. No JVD present.   Cardiovascular: Normal rate, regular rhythm, normal heart sounds and intact distal pulses.    Pulmonary/Chest: Effort normal and breath sounds normal. No respiratory distress. She has no  wheezes. She has no rales.   Musculoskeletal: She exhibits no edema.   Lymphadenopathy:     She has no cervical adenopathy.   Neurological: She is alert and oriented to person, place, and time.   Skin: Skin is warm and dry. No rash noted. She is not diaphoretic. No pallor.       Assessment:       1. Viral sinusitis    2. HTN, goal below 130/80        Plan:    1. Rx Xyzal/Flonase, kenalog 40 mg IM       No decongestants 2/2 HTN   2. Controlled, CPT

## 2018-02-06 NOTE — TELEPHONE ENCOUNTER
----- Message from Tala Vieira sent at 2/6/2018  7:43 AM CST -----  Contact: SELF/ [phone]  Patient would like to get medical advice.  Symptoms (please be specific):  SINUS, colored muscus  How long has patient had these symptoms:    Pharm[facility] and phone #[facility] [id number]  [address] [address] [facility] [address] [phone] (Phone)  [phone] (Fax)      Any drug allergies:    Comments: Patient would like an antibiotic, patient was in last week and was given Xyzol and a steroid injection and Flonase.

## 2018-02-07 RX ORDER — AZITHROMYCIN 250 MG/1
TABLET, FILM COATED ORAL
Qty: 6 TABLET | Refills: 0 | Status: SHIPPED | OUTPATIENT
Start: 2018-02-07 | End: 2018-02-11

## 2018-03-09 RX ORDER — ERGOCALCIFEROL 1.25 MG/1
CAPSULE ORAL
Qty: 12 CAPSULE | Refills: 0 | Status: SHIPPED | OUTPATIENT
Start: 2018-03-09 | End: 2018-06-07 | Stop reason: SDUPTHER

## 2018-06-07 RX ORDER — ERGOCALCIFEROL 1.25 MG/1
CAPSULE ORAL
Qty: 12 CAPSULE | Refills: 0 | Status: SHIPPED | OUTPATIENT
Start: 2018-06-07 | End: 2018-09-09 | Stop reason: SDUPTHER

## 2018-06-18 RX ORDER — LOSARTAN POTASSIUM 100 MG/1
TABLET ORAL
Qty: 90 TABLET | Refills: 0 | Status: SHIPPED | OUTPATIENT
Start: 2018-06-18 | End: 2018-09-16 | Stop reason: SDUPTHER

## 2018-07-16 ENCOUNTER — OFFICE VISIT (OUTPATIENT)
Dept: INTERNAL MEDICINE | Facility: CLINIC | Age: 48
End: 2018-07-16
Payer: COMMERCIAL

## 2018-07-16 ENCOUNTER — HOSPITAL ENCOUNTER (OUTPATIENT)
Dept: RADIOLOGY | Facility: HOSPITAL | Age: 48
Discharge: HOME | End: 2018-07-16
Attending: INTERNAL MEDICINE
Payer: COMMERCIAL

## 2018-07-16 VITALS
TEMPERATURE: 98 F | HEIGHT: 65 IN | SYSTOLIC BLOOD PRESSURE: 120 MMHG | BODY MASS INDEX: 29.94 KG/M2 | DIASTOLIC BLOOD PRESSURE: 84 MMHG | RESPIRATION RATE: 20 BRPM | WEIGHT: 179.69 LBS

## 2018-07-16 DIAGNOSIS — M79.605 LEFT LEG PAIN: Primary | ICD-10-CM

## 2018-07-16 DIAGNOSIS — M25.552 LEFT HIP PAIN: ICD-10-CM

## 2018-07-16 DIAGNOSIS — I10 ESSENTIAL HYPERTENSION: ICD-10-CM

## 2018-07-16 PROCEDURE — 3008F BODY MASS INDEX DOCD: CPT | Mod: CPTII,S$GLB,, | Performed by: INTERNAL MEDICINE

## 2018-07-16 PROCEDURE — 73502 X-RAY EXAM HIP UNI 2-3 VIEWS: CPT | Mod: 26,LT,, | Performed by: RADIOLOGY

## 2018-07-16 PROCEDURE — 3074F SYST BP LT 130 MM HG: CPT | Mod: CPTII,S$GLB,, | Performed by: INTERNAL MEDICINE

## 2018-07-16 PROCEDURE — 3079F DIAST BP 80-89 MM HG: CPT | Mod: CPTII,S$GLB,, | Performed by: INTERNAL MEDICINE

## 2018-07-16 PROCEDURE — 99214 OFFICE O/P EST MOD 30 MIN: CPT | Mod: S$GLB,,, | Performed by: INTERNAL MEDICINE

## 2018-07-16 PROCEDURE — 73502 X-RAY EXAM HIP UNI 2-3 VIEWS: CPT | Mod: TC,PO,LT

## 2018-07-16 PROCEDURE — 99999 PR PBB SHADOW E&M-EST. PATIENT-LVL III: CPT | Mod: PBBFAC,,, | Performed by: INTERNAL MEDICINE

## 2018-07-16 RX ORDER — TIZANIDINE 4 MG/1
4 TABLET ORAL NIGHTLY
Qty: 30 TABLET | Refills: 3 | Status: SHIPPED | OUTPATIENT
Start: 2018-07-16 | End: 2018-07-26

## 2018-07-16 NOTE — PROGRESS NOTES
CC: left leg pain  HPI:  The patient is a 48 y.o. year old female who presents to the office for followup of hypertension.  The patient denies any chest pain, shortness of breath, headache, blurred vision, excessive fatigue, nausea or vomiting.  She complains of left leg pain that is worse at night when sitting and recumbent.  She denies any swelling or low back pain.  The pain starts in her left hip.  Pain has been present for about 1 month.  The patient denies any trauma.    PAST MEDICAL HISTORY:  Past Medical History:   Diagnosis Date    AR (allergic rhinitis)     HTN (hypertension)     Hyperlipidemia        SURGICAL HISTORY:  Past Surgical History:   Procedure Laterality Date    none         MEDS:  Medcard reviewed and updated    ALLERGIES: Allergy Card reviewed and updated    SOCIAL HISTORY:   The patient is a nonsmoker.    PE:   APPEARANCE: Well nourished, well developed, in no acute distress.    CHEST: Lungs clear to auscultation with unlabored respirations.  CARDIOVASCULAR: Normal S1, S2. No murmurs. No carotid bruits. No pedal edema.  ABDOMEN: Bowel sounds normal. Not distended. Soft. No tenderness or masses.   PSYCHIATRIC: The patient is oriented to person, place, and time and has a pleasant affect.        ASSESSMENT/PLAN:  Ruth was seen today for leg pain.    Diagnoses and all orders for this visit:    Left leg pain  -     Cardiology Lab US Lower Extremity Veins Left; Future    Left hip pain  -     X-Ray Hip 2 View Left; Future    Essential hypertension  -     blood pressure is controlled    Other orders  -     tiZANidine (ZANAFLEX) 4 MG tablet; Take 1 tablet (4 mg total) by mouth every evening. for 10 days

## 2018-07-17 ENCOUNTER — CLINICAL SUPPORT (OUTPATIENT)
Dept: CARDIOLOGY | Facility: CLINIC | Age: 48
End: 2018-07-17
Attending: INTERNAL MEDICINE
Payer: COMMERCIAL

## 2018-07-17 DIAGNOSIS — M79.605 LEFT LEG PAIN: ICD-10-CM

## 2018-07-17 PROCEDURE — 93971 EXTREMITY STUDY: CPT | Mod: S$GLB,,, | Performed by: INTERNAL MEDICINE

## 2018-07-20 ENCOUNTER — TELEPHONE (OUTPATIENT)
Dept: INTERNAL MEDICINE | Facility: CLINIC | Age: 48
End: 2018-07-20

## 2018-08-22 ENCOUNTER — OFFICE VISIT (OUTPATIENT)
Dept: INTERNAL MEDICINE | Facility: CLINIC | Age: 48
End: 2018-08-22
Payer: COMMERCIAL

## 2018-08-22 VITALS
BODY MASS INDEX: 29.82 KG/M2 | SYSTOLIC BLOOD PRESSURE: 122 MMHG | RESPIRATION RATE: 18 BRPM | HEIGHT: 65 IN | DIASTOLIC BLOOD PRESSURE: 85 MMHG | WEIGHT: 179 LBS | HEART RATE: 83 BPM | TEMPERATURE: 99 F

## 2018-08-22 DIAGNOSIS — J32.9 VIRAL SINUSITIS: Primary | ICD-10-CM

## 2018-08-22 DIAGNOSIS — J20.9 ACUTE BRONCHITIS, UNSPECIFIED ORGANISM: ICD-10-CM

## 2018-08-22 DIAGNOSIS — B97.89 VIRAL SINUSITIS: Primary | ICD-10-CM

## 2018-08-22 DIAGNOSIS — I10 HYPERTENSION, UNSPECIFIED TYPE: ICD-10-CM

## 2018-08-22 PROCEDURE — 3008F BODY MASS INDEX DOCD: CPT | Mod: CPTII,S$GLB,, | Performed by: INTERNAL MEDICINE

## 2018-08-22 PROCEDURE — 99999 PR PBB SHADOW E&M-EST. PATIENT-LVL III: CPT | Mod: PBBFAC,,, | Performed by: INTERNAL MEDICINE

## 2018-08-22 PROCEDURE — 99214 OFFICE O/P EST MOD 30 MIN: CPT | Mod: S$GLB,,, | Performed by: INTERNAL MEDICINE

## 2018-08-22 PROCEDURE — 3079F DIAST BP 80-89 MM HG: CPT | Mod: CPTII,S$GLB,, | Performed by: INTERNAL MEDICINE

## 2018-08-22 PROCEDURE — 3074F SYST BP LT 130 MM HG: CPT | Mod: CPTII,S$GLB,, | Performed by: INTERNAL MEDICINE

## 2018-08-22 RX ORDER — METHYLPREDNISOLONE 4 MG/1
TABLET ORAL
Qty: 1 PACKAGE | Refills: 0 | Status: SHIPPED | OUTPATIENT
Start: 2018-08-22 | End: 2018-10-24 | Stop reason: ALTCHOICE

## 2018-08-22 RX ORDER — CODEINE PHOSPHATE AND GUAIFENESIN 10; 100 MG/5ML; MG/5ML
5 SOLUTION ORAL 3 TIMES DAILY PRN
Qty: 236 ML | Refills: 0 | Status: SHIPPED | OUTPATIENT
Start: 2018-08-22 | End: 2018-09-01

## 2018-08-22 RX ORDER — LEVOCETIRIZINE DIHYDROCHLORIDE 5 MG/1
5 TABLET, FILM COATED ORAL NIGHTLY
Qty: 30 TABLET | Refills: 11 | Status: SHIPPED | OUTPATIENT
Start: 2018-08-22 | End: 2018-10-24 | Stop reason: ALTCHOICE

## 2018-08-22 RX ORDER — AZELASTINE 1 MG/ML
SPRAY, METERED NASAL
COMMUNITY
Start: 2018-08-17 | End: 2018-10-24 | Stop reason: ALTCHOICE

## 2018-08-22 NOTE — PROGRESS NOTES
Subjective:       Patient ID: Ruth Harper is a 48 y.o. female.    Chief Complaint: Follow-up (urgent care 8-17-18); Cough (yellow mucus); and Chest Congestion    HPI   Pt here for 1 week of worsening sinus/chest congestion, mild productive cough, post nasal drip, sore throat, slight fevers. She was seen at  on 8/17 and was given a Z-pack, steroid shot and Astelin which has not helped much.   Review of Systems   Constitutional: Positive for fever. Negative for activity change, appetite change, chills, diaphoresis, fatigue and unexpected weight change.   HENT: Positive for postnasal drip, rhinorrhea and sore throat. Negative for ear pain, sinus pressure, sneezing, trouble swallowing and voice change.    Respiratory: Positive for cough and wheezing. Negative for shortness of breath.    Cardiovascular: Negative for chest pain, palpitations and leg swelling.   Gastrointestinal: Negative for abdominal pain, blood in stool, constipation, diarrhea, nausea and vomiting.   Genitourinary: Negative for dysuria.   Musculoskeletal: Negative for arthralgias and myalgias.   Skin: Negative for rash and wound.   Allergic/Immunologic: Negative for environmental allergies and food allergies.   Neurological: Positive for headaches.   Hematological: Negative for adenopathy. Does not bruise/bleed easily.       Objective:      Physical Exam   Constitutional: She is oriented to person, place, and time. She appears well-developed and well-nourished. No distress.   HENT:   Head: Normocephalic and atraumatic.   Right Ear: External ear normal.   Left Ear: External ear normal.   Nose: Nose normal.   Mouth/Throat: Oropharynx is clear and moist. No oropharyngeal exudate.   Eyes: Conjunctivae and EOM are normal. Pupils are equal, round, and reactive to light. Right eye exhibits no discharge. Left eye exhibits no discharge. No scleral icterus.   Neck: Neck supple. No JVD present.   Cardiovascular: Normal rate, regular rhythm, normal heart  sounds and intact distal pulses.   Pulmonary/Chest: Effort normal and breath sounds normal. No respiratory distress. She has no wheezes. She has no rales.   Abdominal: Soft. Bowel sounds are normal. There is no tenderness.   Musculoskeletal: She exhibits no edema.   Lymphadenopathy:     She has no cervical adenopathy.   Neurological: She is alert and oriented to person, place, and time.   Skin: Skin is warm and dry. No rash noted. She is not diaphoretic. No pallor.       Assessment:       1. Viral sinusitis    2. Acute bronchitis, unspecified organism    3. Hypertension, unspecified type        Plan:    1. Rx Xyzal/Medrol pack and continue Astelin       No decongestants 2/2 HTN   2. Rx Cheratussin AC TID PRN   3. Stable, CPT

## 2018-09-09 RX ORDER — ERGOCALCIFEROL 1.25 MG/1
CAPSULE ORAL
Qty: 12 CAPSULE | Refills: 0 | Status: SHIPPED | OUTPATIENT
Start: 2018-09-09 | End: 2018-12-03 | Stop reason: SDUPTHER

## 2018-09-17 RX ORDER — LOSARTAN POTASSIUM 100 MG/1
TABLET ORAL
Qty: 90 TABLET | Refills: 0 | Status: SHIPPED | OUTPATIENT
Start: 2018-09-17 | End: 2018-10-24 | Stop reason: SDUPTHER

## 2018-10-10 ENCOUNTER — TELEPHONE (OUTPATIENT)
Dept: INTERNAL MEDICINE | Facility: CLINIC | Age: 48
End: 2018-10-10

## 2018-10-10 DIAGNOSIS — Z00.00 ROUTINE GENERAL MEDICAL EXAMINATION AT A HEALTH CARE FACILITY: Primary | ICD-10-CM

## 2018-10-10 NOTE — TELEPHONE ENCOUNTER
----- Message from Angelia Hylton sent at 10/10/2018  4:11 PM CDT -----  Doctor appointment and lab have been scheduled.  Please link lab orders to the lab appointment.  Date of doctor appointment:  10/24  Physical or EP:   EPP  Date of lab appointment:  10/16  Comments:

## 2018-10-17 ENCOUNTER — LAB VISIT (OUTPATIENT)
Dept: LAB | Facility: HOSPITAL | Age: 48
End: 2018-10-17
Attending: INTERNAL MEDICINE
Payer: COMMERCIAL

## 2018-10-17 DIAGNOSIS — Z00.00 ROUTINE GENERAL MEDICAL EXAMINATION AT A HEALTH CARE FACILITY: ICD-10-CM

## 2018-10-17 LAB
ALBUMIN SERPL BCP-MCNC: 3.8 G/DL
ALP SERPL-CCNC: 54 U/L
ALT SERPL W/O P-5'-P-CCNC: 24 U/L
ANION GAP SERPL CALC-SCNC: 8 MMOL/L
AST SERPL-CCNC: 16 U/L
BASOPHILS # BLD AUTO: 0.05 K/UL
BASOPHILS NFR BLD: 0.9 %
BILIRUB SERPL-MCNC: 0.6 MG/DL
BUN SERPL-MCNC: 18 MG/DL
CALCIUM SERPL-MCNC: 10.2 MG/DL
CHLORIDE SERPL-SCNC: 106 MMOL/L
CHOLEST SERPL-MCNC: 235 MG/DL
CHOLEST/HDLC SERPL: 4.6 {RATIO}
CO2 SERPL-SCNC: 27 MMOL/L
CREAT SERPL-MCNC: 0.7 MG/DL
DIFFERENTIAL METHOD: ABNORMAL
EOSINOPHIL # BLD AUTO: 0.4 K/UL
EOSINOPHIL NFR BLD: 6.7 %
ERYTHROCYTE [DISTWIDTH] IN BLOOD BY AUTOMATED COUNT: 13.2 %
EST. GFR  (AFRICAN AMERICAN): >60 ML/MIN/1.73 M^2
EST. GFR  (NON AFRICAN AMERICAN): >60 ML/MIN/1.73 M^2
ESTIMATED AVG GLUCOSE: 108 MG/DL
GLUCOSE SERPL-MCNC: 98 MG/DL
HBA1C MFR BLD HPLC: 5.4 %
HCT VFR BLD AUTO: 42.5 %
HDLC SERPL-MCNC: 51 MG/DL
HDLC SERPL: 21.7 %
HGB BLD-MCNC: 13.4 G/DL
IMM GRANULOCYTES # BLD AUTO: 0.01 K/UL
IMM GRANULOCYTES NFR BLD AUTO: 0.2 %
LDLC SERPL CALC-MCNC: 149.4 MG/DL
LYMPHOCYTES # BLD AUTO: 1.7 K/UL
LYMPHOCYTES NFR BLD: 32.4 %
MCH RBC QN AUTO: 29.5 PG
MCHC RBC AUTO-ENTMCNC: 31.5 G/DL
MCV RBC AUTO: 93 FL
MONOCYTES # BLD AUTO: 0.3 K/UL
MONOCYTES NFR BLD: 6.4 %
NEUTROPHILS # BLD AUTO: 2.9 K/UL
NEUTROPHILS NFR BLD: 53.4 %
NONHDLC SERPL-MCNC: 184 MG/DL
NRBC BLD-RTO: 0 /100 WBC
PLATELET # BLD AUTO: 324 K/UL
PMV BLD AUTO: 11.4 FL
POTASSIUM SERPL-SCNC: 4.6 MMOL/L
PROT SERPL-MCNC: 7 G/DL
RBC # BLD AUTO: 4.55 M/UL
SODIUM SERPL-SCNC: 141 MMOL/L
TRIGL SERPL-MCNC: 173 MG/DL
TSH SERPL DL<=0.005 MIU/L-ACNC: 2.41 UIU/ML
WBC # BLD AUTO: 5.34 K/UL

## 2018-10-17 PROCEDURE — 83036 HEMOGLOBIN GLYCOSYLATED A1C: CPT

## 2018-10-17 PROCEDURE — 84443 ASSAY THYROID STIM HORMONE: CPT

## 2018-10-17 PROCEDURE — 85025 COMPLETE CBC W/AUTO DIFF WBC: CPT

## 2018-10-17 PROCEDURE — 82306 VITAMIN D 25 HYDROXY: CPT

## 2018-10-17 PROCEDURE — 36415 COLL VENOUS BLD VENIPUNCTURE: CPT | Mod: PO

## 2018-10-17 PROCEDURE — 80053 COMPREHEN METABOLIC PANEL: CPT

## 2018-10-17 PROCEDURE — 80061 LIPID PANEL: CPT

## 2018-10-18 LAB — 25(OH)D3+25(OH)D2 SERPL-MCNC: 24 NG/ML

## 2018-10-24 ENCOUNTER — TELEPHONE (OUTPATIENT)
Dept: INTERNAL MEDICINE | Facility: CLINIC | Age: 48
End: 2018-10-24

## 2018-10-24 ENCOUNTER — OFFICE VISIT (OUTPATIENT)
Dept: INTERNAL MEDICINE | Facility: CLINIC | Age: 48
End: 2018-10-24
Payer: COMMERCIAL

## 2018-10-24 VITALS
DIASTOLIC BLOOD PRESSURE: 82 MMHG | OXYGEN SATURATION: 98 % | RESPIRATION RATE: 18 BRPM | SYSTOLIC BLOOD PRESSURE: 124 MMHG | TEMPERATURE: 100 F | HEIGHT: 65 IN | HEART RATE: 88 BPM | WEIGHT: 177 LBS | BODY MASS INDEX: 29.49 KG/M2

## 2018-10-24 DIAGNOSIS — Z00.00 ROUTINE GENERAL MEDICAL EXAMINATION AT A HEALTH CARE FACILITY: Primary | ICD-10-CM

## 2018-10-24 DIAGNOSIS — J02.9 ACUTE PHARYNGITIS, UNSPECIFIED ETIOLOGY: Primary | ICD-10-CM

## 2018-10-24 DIAGNOSIS — J02.9 ACUTE PHARYNGITIS, UNSPECIFIED ETIOLOGY: ICD-10-CM

## 2018-10-24 LAB — DEPRECATED S PYO AG THROAT QL EIA: POSITIVE

## 2018-10-24 PROCEDURE — 99999 PR PBB SHADOW E&M-EST. PATIENT-LVL IV: CPT | Mod: PBBFAC,,, | Performed by: INTERNAL MEDICINE

## 2018-10-24 PROCEDURE — 99396 PREV VISIT EST AGE 40-64: CPT | Mod: S$GLB,,, | Performed by: INTERNAL MEDICINE

## 2018-10-24 PROCEDURE — 87880 STREP A ASSAY W/OPTIC: CPT | Mod: PO

## 2018-10-24 PROCEDURE — 3079F DIAST BP 80-89 MM HG: CPT | Mod: CPTII,S$GLB,, | Performed by: INTERNAL MEDICINE

## 2018-10-24 PROCEDURE — 3074F SYST BP LT 130 MM HG: CPT | Mod: CPTII,S$GLB,, | Performed by: INTERNAL MEDICINE

## 2018-10-24 RX ORDER — LOSARTAN POTASSIUM 100 MG/1
100 TABLET ORAL DAILY
Qty: 90 TABLET | Refills: 3 | Status: SHIPPED | OUTPATIENT
Start: 2018-10-24 | End: 2020-02-03 | Stop reason: SDUPTHER

## 2018-10-24 RX ORDER — AZITHROMYCIN 250 MG/1
TABLET, FILM COATED ORAL
Qty: 6 TABLET | Refills: 0 | Status: SHIPPED | OUTPATIENT
Start: 2018-10-24 | End: 2018-10-29

## 2018-10-24 NOTE — LETTER
October 24, 2018      [facility]  [address] [facility]  [place] [address]  Phone: [phone]  Fax: [phone]       Patient: Ruth Harper   YOB: 1970  Date of Visit: 10/24/2018    To Whom It May Concern:    Rosibel Harper  was at [facility] on 10/24/2018. She may return to work/school on 10/26/2018 with no restrictions. If you have any questions or concerns, or if I can be of further assistance, please do not hesitate to contact me.    Sincerely,    Montse Harris MD

## 2018-10-24 NOTE — PROGRESS NOTES
The patient is a 48 y.o. old female who presents to the office for a physical.  Review of labs reveals elevated cholesterol and low vitamin-D.    PAST MEDICAL HISTORY  Past Medical History:   Diagnosis Date    AR (allergic rhinitis)     HTN (hypertension)     Hyperlipidemia        SURGICAL HISTORY:  Past Surgical History:   Procedure Laterality Date    none           MEDS:  Medcard reviewed and updated    ALLERGIES: Allergy Card reviewed and updated    SOCIAL HISTORY:   The patient is a nonsmoker, denies alcohol or illicit drug use.    ROS:  GENERAL: Positive fever and chills.  Denies fatigability or weight loss.  SKIN: No rashes.  HEAD: No headaches or recent head trauma.  EYES: No photophobia, ocular pain or diplopia.  EARS: Denies ear pain, discharge or vertigo.  NOSE: No epistaxis.  Positive postnasal drip.  MOUTH & THROAT: No hoarseness or change in voice.  Positive sore throat.  NODES: Positive swollen glands.  CHEST: Denies shortness of breath, wheezing, cough and sputum production.  CARDIOVASCULAR: Denies chest pain or palpitations.  ABDOMEN: Appetite fine. Denies diarrhea, abdominal pain, constipation or blood in stool.  URINARY: No dysuria or hematuria.  MUSCULOSKELETAL: No joint stiffness or swelling. Denies back pain.  NEUROLOGIC: No history of seizures.  ENDOCRINE: Denies polyuria or polydipsia.  PSYCHIATRIC: Denies mood swings, depression, anxiety, homicidal or suicidal thoughts.    SCREENINGS:  Last cholesterol: 2018  Last colonoscopy: none  Last mammogram: 2018  Last Pap smear: 2018  Last tetanus: none  Last Pneumovax: none  Last eye exam: about 1 year ago  Last bone density: none  Last menstrual period: 3 weeks    PE:    Vitals:  Vitals:    10/24/18 1544   BP: 124/82   Pulse: 88   Resp: 18   Temp: 100.1 °F (37.8 °C)       APPEARANCE: Well nourished, well developed, in no acute distress.    EYES: Sclerae anicteric. PERRL. EOMI.      EARS: TM's intact. No retraction or perforation.    NOSE: Mucosa  pink. Airway clear.  MOUTH & THROAT: No tonsillar enlargement. No pharyngeal erythema or exudate. No stridor.  NECK: Supple, no thyromegaly.  NODES: No cervical, axillary or inguinal lymph node enlargement.  CHEST: Lungs clear to auscultation with unlabored respirations.  CARDIOVASCULAR: Normal S1, S2. No murmurs. No carotid bruits. No pedal edema.  ABDOMEN: Bowel sounds normal. Not distended. Soft. No tenderness or masses. No organomegaly.  MUSCULOSKELETAL:  Normal gait, no cyanosis or clubbing. Stength 5//5 in all 4 extremities.   SKIN: Normal skin turgor, warm and dry.  NEUROLOGIC: Cranial Nerves: Intact.  PSYCHIATRIC: The patient is oriented to person, place, and time and has a pleasant affect.        ASSESSMENT/PLAN:  Ruth was seen today for annual exam and sore throat.    Diagnoses and all orders for this visit:    Routine general medical examination at a health care facility  -     labs reviewed  -     continue vitamin-D supplementation  -     10 year ASCVD is 2%  -     encouraged healthy diet and regular exercise    Acute pharyngitis, unspecified etiology  -     THROAT SCREEN, RAPID

## 2018-10-26 ENCOUNTER — PATIENT MESSAGE (OUTPATIENT)
Dept: INTERNAL MEDICINE | Facility: CLINIC | Age: 48
End: 2018-10-26

## 2018-10-26 ENCOUNTER — OFFICE VISIT (OUTPATIENT)
Dept: URGENT CARE | Facility: CLINIC | Age: 48
End: 2018-10-26
Payer: COMMERCIAL

## 2018-10-26 VITALS
HEIGHT: 65 IN | OXYGEN SATURATION: 96 % | WEIGHT: 175 LBS | HEART RATE: 93 BPM | TEMPERATURE: 99 F | RESPIRATION RATE: 18 BRPM | BODY MASS INDEX: 29.16 KG/M2 | DIASTOLIC BLOOD PRESSURE: 85 MMHG | SYSTOLIC BLOOD PRESSURE: 121 MMHG

## 2018-10-26 DIAGNOSIS — J02.0 STREP PHARYNGITIS: Primary | ICD-10-CM

## 2018-10-26 PROCEDURE — 3074F SYST BP LT 130 MM HG: CPT | Mod: CPTII,S$GLB,, | Performed by: FAMILY MEDICINE

## 2018-10-26 PROCEDURE — 3008F BODY MASS INDEX DOCD: CPT | Mod: CPTII,S$GLB,, | Performed by: FAMILY MEDICINE

## 2018-10-26 PROCEDURE — 3079F DIAST BP 80-89 MM HG: CPT | Mod: CPTII,S$GLB,, | Performed by: FAMILY MEDICINE

## 2018-10-26 PROCEDURE — 99214 OFFICE O/P EST MOD 30 MIN: CPT | Mod: S$GLB,,, | Performed by: FAMILY MEDICINE

## 2018-10-26 RX ORDER — ACETAMINOPHEN AND CODEINE PHOSPHATE 300; 30 MG/1; MG/1
1 TABLET ORAL EVERY 6 HOURS PRN
Qty: 20 TABLET | Refills: 0 | Status: SHIPPED | OUTPATIENT
Start: 2018-10-26 | End: 2018-11-05

## 2018-10-26 NOTE — LETTER
October 26, 2018      [facility]  [address]  [facility] [address]  Phone: [phone]  Fax: [phone]       Patient: Ruth Harper   YOB: 1970  Date of Visit: 10/26/2018    To Whom It May Concern:    Rosibel Harper  was at [facility] on 10/26/2018. She may return to work/school on 10/29/18 with no restrictions. If you have any questions or concerns, or if I can be of further assistance, please do not hesitate to contact me.    Sincerely,          Polly Jalloh MA

## 2018-10-26 NOTE — PATIENT INSTRUCTIONS
Pharyngitis: Strep (Confirmed)    You have had a positive test for strep throat. Strep throat is a contagious illness. It is spread by coughing, kissing or by touching others after touching your mouth or nose. Symptoms include throat pain that is worse with swallowing, aching all over, headache, and fever. It is treated with antibiotic medicine. This should help you start to feel better in 1 to 2 days.  Home care  · Rest at home. Drink plenty of fluids to you won't get dehydrated.  · No work or school for the first 2 days of taking the antibiotics. After this time, you will not be contagious. You can then return to school or work if you are feeling better.   · Take antibiotic medicine for the full 10 days, even if you feel better. This is very important to ensure the infection is treated. It is also important to prevent medicine-resistant germs from developing. If you were given an antibiotic shot, you don't need any more antibiotics.  · You may use acetaminophen or ibuprofen to control pain or fever, unless another medicine was prescribed for this. Talk with your doctor before taking these medicines if you have chronic liver or kidney disease. Also talk with your doctor if you have had a stomach ulcer or GI bleeding.  · Throat lozenges or sprays help reduce pain. Gargling with warm saltwater will also reduce throat pain. Dissolve 1/2 teaspoon of salt in 1 glass of warm water. This may be useful just before meals.   · Soft foods are OK. Avoid salty or spicy foods.  Follow-up care  Follow up with your healthcare provider or our staff if you don't get better over the next week.  When to seek medical advice  Call your healthcare provider right away if any of these occur:  · Fever of 100.4ºF (38ºC) or higher, or as directed by your healthcare provider  · New or worsening ear pain, sinus pain, or headache  · Painful lumps in the back of neck  · Stiff neck  · Lymph nodes getting larger or becoming soft in the  middle  · You can't swallow liquids or you can't open your mouth wide because of throat pain  · Signs of dehydration. These include very dark urine or no urine, sunken eyes, and dizziness.  · Trouble breathing or noisy breathing  · Muffled voice  · Rash  Date Last Reviewed: 4/13/2015  © 7375-0175 [employer]. [address] [place] [address], [place] [address]. All rights reserved. This information is not intended as a substitute for professional medical care. Always follow your healthcare professional's instructions.

## 2018-10-26 NOTE — PROGRESS NOTES
"Subjective:       Patient ID: Ruth Harper is a 48 y.o. female.    Vitals:  height is 5' 5" (1.651 m) and weight is 79.4 kg (175 lb). Her temperature is 99.4 °F (37.4 °C). Her blood pressure is 121/85 and her pulse is 93. Her respiration is 18 and oxygen saturation is 96%.     Chief Complaint: Sore Throat    This is a 48 y.o. female who presents today with a chief complaint of   Pt was seen on Wednesday by PCP and tested Positive for Strep on Z-barron but sx are getting worse. She iis having trouble swallowing still has fever and taking Tylenol and IBP around the clock      Sore Throat    This is a new problem. The current episode started in the past 7 days. The problem has been gradually worsening. Neither side of throat is experiencing more pain than the other. The maximum temperature recorded prior to her arrival was 101 - 101.9 F. The fever has been present for 1 to 2 days. The pain is at a severity of 9/10. The pain is severe. Associated symptoms include congestion and headaches. Pertinent negatives include no abdominal pain, coughing, ear pain, hoarse voice or shortness of breath. She has had exposure to strep. She has tried acetaminophen and NSAIDs (z-barron) for the symptoms. The treatment provided no relief.     Review of Systems   Constitution: Positive for fever. Negative for chills and malaise/fatigue.   HENT: Positive for congestion and sore throat. Negative for ear pain and hoarse voice.    Eyes: Negative for discharge and redness.   Cardiovascular: Negative for chest pain, dyspnea on exertion and leg swelling.   Respiratory: Negative for cough, shortness of breath, sputum production and wheezing.    Musculoskeletal: Negative for myalgias.   Gastrointestinal: Negative for abdominal pain and nausea.   Neurological: Positive for headaches.       Objective:      Physical Exam   Constitutional: She appears well-developed and well-nourished.   HENT:   Head: Normocephalic and atraumatic.   Mouth/Throat: " Posterior oropharyngeal erythema present. Tonsils are 2+ on the right. Tonsils are 2+ on the left. Tonsillar exudate.   Eyes: EOM are normal. Pupils are equal, round, and reactive to light.   Neck: Normal range of motion. Neck supple.   Cardiovascular: Normal rate, regular rhythm and normal heart sounds.   Pulmonary/Chest: Effort normal and breath sounds normal.   Abdominal: Soft.   Lymphadenopathy:     She has cervical adenopathy.   Nursing note and vitals reviewed.      Assessment:       1. Strep pharyngitis        Plan:         Strep pharyngitis  -     (Magic mouthwash) 1:1:1 Benadryl 12.5mg/5ml liq, aluminum & magnesium hydroxide-simehticone (Maalox), lidocaine viscous 2%; Swish and spit 5 mLs every 4 (four) hours as needed (sore throat). for mouth sores  Dispense: 60 mL; Refill: 0    continue azithromycin, fluids, warm salt water gargles. Ibuprofen. RTC prn.

## 2018-10-26 NOTE — LETTER
October 26, 2018      [facility]  [address]  [facility] [address]  Phone: [phone]  Fax: [phone]       Patient: Ruth Harper   YOB: 1970  Date of Visit: 10/26/2018    To Whom It May Concern:    Rosibel Harper  was at [facility] on 10/26/2018. She may return to work/school on 10/29/18 with no restrictions. If you have any questions or concerns, or if I can be of further assistance, please do not hesitate to contact me.    Sincerely,        Polly Jalloh MA

## 2018-10-27 ENCOUNTER — PATIENT MESSAGE (OUTPATIENT)
Dept: INTERNAL MEDICINE | Facility: CLINIC | Age: 48
End: 2018-10-27

## 2018-10-27 ENCOUNTER — OFFICE VISIT (OUTPATIENT)
Dept: URGENT CARE | Facility: CLINIC | Age: 48
End: 2018-10-27
Payer: COMMERCIAL

## 2018-10-27 VITALS
HEART RATE: 104 BPM | SYSTOLIC BLOOD PRESSURE: 145 MMHG | HEIGHT: 65 IN | DIASTOLIC BLOOD PRESSURE: 91 MMHG | RESPIRATION RATE: 19 BRPM | BODY MASS INDEX: 29.16 KG/M2 | TEMPERATURE: 100 F | WEIGHT: 175 LBS | OXYGEN SATURATION: 98 %

## 2018-10-27 DIAGNOSIS — J03.90 EXUDATIVE TONSILLITIS: Primary | ICD-10-CM

## 2018-10-27 PROCEDURE — 99214 OFFICE O/P EST MOD 30 MIN: CPT | Mod: 25,S$GLB,, | Performed by: INTERNAL MEDICINE

## 2018-10-27 PROCEDURE — 3008F BODY MASS INDEX DOCD: CPT | Mod: CPTII,S$GLB,, | Performed by: INTERNAL MEDICINE

## 2018-10-27 PROCEDURE — 3077F SYST BP >= 140 MM HG: CPT | Mod: CPTII,S$GLB,, | Performed by: INTERNAL MEDICINE

## 2018-10-27 PROCEDURE — 3080F DIAST BP >= 90 MM HG: CPT | Mod: CPTII,S$GLB,, | Performed by: INTERNAL MEDICINE

## 2018-10-27 PROCEDURE — 96372 THER/PROPH/DIAG INJ SC/IM: CPT | Mod: S$GLB,,, | Performed by: INTERNAL MEDICINE

## 2018-10-27 RX ORDER — CEFTRIAXONE 1 G/1
1 INJECTION, POWDER, FOR SOLUTION INTRAMUSCULAR; INTRAVENOUS
Status: COMPLETED | OUTPATIENT
Start: 2018-10-27 | End: 2018-10-27

## 2018-10-27 RX ORDER — CLINDAMYCIN HYDROCHLORIDE 300 MG/1
300 CAPSULE ORAL 3 TIMES DAILY
Qty: 30 CAPSULE | Refills: 0 | Status: SHIPPED | OUTPATIENT
Start: 2018-10-27 | End: 2018-11-06

## 2018-10-27 RX ORDER — BETAMETHASONE SODIUM PHOSPHATE AND BETAMETHASONE ACETATE 3; 3 MG/ML; MG/ML
9 INJECTION, SUSPENSION INTRA-ARTICULAR; INTRALESIONAL; INTRAMUSCULAR; SOFT TISSUE ONCE
Status: COMPLETED | OUTPATIENT
Start: 2018-10-27 | End: 2018-10-27

## 2018-10-27 RX ADMIN — BETAMETHASONE SODIUM PHOSPHATE AND BETAMETHASONE ACETATE 9 MG: 3; 3 INJECTION, SUSPENSION INTRA-ARTICULAR; INTRALESIONAL; INTRAMUSCULAR; SOFT TISSUE at 11:10

## 2018-10-27 RX ADMIN — CEFTRIAXONE 1 G: 1 INJECTION, POWDER, FOR SOLUTION INTRAMUSCULAR; INTRAVENOUS at 11:10

## 2018-10-27 NOTE — PROGRESS NOTES
"Subjective:       Patient ID: Ruth Harper is a 48 y.o. female.    Vitals:  height is 5' 5" (1.651 m) and weight is 79.4 kg (175 lb). Her oral temperature is 100 °F (37.8 °C). Her blood pressure is 145/91 (abnormal) and her pulse is 104. Her respiration is 19 and oxygen saturation is 98%.     Chief Complaint: Sore Throat    Sore Throat    This is a new problem. The current episode started in the past 7 days (10/24/2018). The problem has been gradually worsening. Maximum temperature: 100.0. The fever has been present for 1 to 2 days. The pain is at a severity of 10/10. The pain is severe. Associated symptoms include a hoarse voice, swollen glands and trouble swallowing. Pertinent negatives include no abdominal pain, congestion, coughing, diarrhea, drooling, ear discharge, ear pain, headaches, plugged ear sensation, neck pain, shortness of breath, stridor or vomiting. She has had exposure to strep. She has had no exposure to mono. She has tried gargles and acetaminophen (antibotics) for the symptoms. The treatment provided mild relief.     Review of Systems   Constitution: Negative for chills, fever and malaise/fatigue.   HENT: Positive for hoarse voice, sore throat and trouble swallowing. Negative for congestion, drooling, ear discharge, ear pain and stridor.    Eyes: Negative for discharge and redness.   Cardiovascular: Negative for chest pain, dyspnea on exertion and leg swelling.   Respiratory: Negative for cough, shortness of breath, sputum production and wheezing.    Musculoskeletal: Negative for myalgias and neck pain.   Gastrointestinal: Negative for abdominal pain, diarrhea, nausea and vomiting.   Neurological: Negative for headaches.       Objective:      Physical Exam   Constitutional: She appears well-developed and well-nourished. She appears ill.   HENT:   Head: Normocephalic and atraumatic.   Mouth/Throat: Oropharyngeal exudate, posterior oropharyngeal edema and posterior oropharyngeal erythema " present. Tonsils are 4+ on the right. Tonsils are 4+ on the left. Tonsillar exudate.   Eyes: Conjunctivae and EOM are normal. Pupils are equal, round, and reactive to light.   Cardiovascular: Normal rate and regular rhythm.   Pulmonary/Chest: Effort normal and breath sounds normal.   Lymphadenopathy:     She has cervical adenopathy.   Nursing note and vitals reviewed.      Assessment:       1. Exudative tonsillitis        Plan:         Exudative tonsillitis  -     clindamycin (CLEOCIN) 300 MG capsule; Take 1 capsule (300 mg total) by mouth 3 (three) times daily. for 10 days  Dispense: 30 capsule; Refill: 0    Other orders  -     cefTRIAXone injection 1 g

## 2018-12-03 RX ORDER — ERGOCALCIFEROL 1.25 MG/1
CAPSULE ORAL
Qty: 12 CAPSULE | Refills: 0 | Status: SHIPPED | OUTPATIENT
Start: 2018-12-03 | End: 2019-03-03 | Stop reason: SDUPTHER

## 2019-03-04 RX ORDER — ERGOCALCIFEROL 1.25 MG/1
CAPSULE ORAL
Qty: 12 CAPSULE | Refills: 0 | Status: SHIPPED | OUTPATIENT
Start: 2019-03-04 | End: 2019-05-29 | Stop reason: SDUPTHER

## 2019-03-18 ENCOUNTER — PATIENT MESSAGE (OUTPATIENT)
Dept: INTERNAL MEDICINE | Facility: CLINIC | Age: 49
End: 2019-03-18

## 2019-03-19 RX ORDER — BUTALBITAL, ACETAMINOPHEN AND CAFFEINE 50; 325; 40 MG/1; MG/1; MG/1
1 TABLET ORAL EVERY 6 HOURS PRN
Qty: 30 TABLET | Refills: 3 | Status: SHIPPED | OUTPATIENT
Start: 2019-03-19 | End: 2019-04-18

## 2019-05-29 RX ORDER — ERGOCALCIFEROL 1.25 MG/1
CAPSULE ORAL
Qty: 12 CAPSULE | Refills: 0 | Status: SHIPPED | OUTPATIENT
Start: 2019-05-29 | End: 2019-08-18 | Stop reason: SDUPTHER

## 2019-05-30 ENCOUNTER — OFFICE VISIT (OUTPATIENT)
Dept: INTERNAL MEDICINE | Facility: CLINIC | Age: 49
End: 2019-05-30
Payer: COMMERCIAL

## 2019-05-30 VITALS
HEART RATE: 77 BPM | SYSTOLIC BLOOD PRESSURE: 118 MMHG | TEMPERATURE: 99 F | BODY MASS INDEX: 29.32 KG/M2 | DIASTOLIC BLOOD PRESSURE: 78 MMHG | RESPIRATION RATE: 18 BRPM | WEIGHT: 176 LBS | HEIGHT: 65 IN

## 2019-05-30 DIAGNOSIS — I10 HTN, GOAL BELOW 130/80: ICD-10-CM

## 2019-05-30 DIAGNOSIS — B97.89 VIRAL SINUSITIS: Primary | ICD-10-CM

## 2019-05-30 DIAGNOSIS — J32.9 VIRAL SINUSITIS: Primary | ICD-10-CM

## 2019-05-30 DIAGNOSIS — J20.9 ACUTE BRONCHITIS, UNSPECIFIED ORGANISM: ICD-10-CM

## 2019-05-30 PROCEDURE — 99999 PR PBB SHADOW E&M-EST. PATIENT-LVL III: CPT | Mod: PBBFAC,,, | Performed by: INTERNAL MEDICINE

## 2019-05-30 PROCEDURE — 99214 OFFICE O/P EST MOD 30 MIN: CPT | Mod: S$GLB,,, | Performed by: INTERNAL MEDICINE

## 2019-05-30 PROCEDURE — 99214 PR OFFICE/OUTPT VISIT, EST, LEVL IV, 30-39 MIN: ICD-10-PCS | Mod: S$GLB,,, | Performed by: INTERNAL MEDICINE

## 2019-05-30 PROCEDURE — 99999 PR PBB SHADOW E&M-EST. PATIENT-LVL III: ICD-10-PCS | Mod: PBBFAC,,, | Performed by: INTERNAL MEDICINE

## 2019-05-30 RX ORDER — AZELASTINE 1 MG/ML
SPRAY, METERED NASAL
Refills: 11 | COMMUNITY
Start: 2019-03-27 | End: 2021-03-06 | Stop reason: ALTCHOICE

## 2019-05-30 RX ORDER — CODEINE PHOSPHATE AND GUAIFENESIN 10; 100 MG/5ML; MG/5ML
5 SOLUTION ORAL 3 TIMES DAILY PRN
Qty: 236 ML | Refills: 0 | Status: SHIPPED | OUTPATIENT
Start: 2019-05-30 | End: 2019-06-09

## 2019-05-30 RX ORDER — FLUTICASONE PROPIONATE 50 MCG
SPRAY, SUSPENSION (ML) NASAL
Refills: 11 | COMMUNITY
Start: 2019-03-07

## 2019-05-30 RX ORDER — METHYLPREDNISOLONE 4 MG/1
TABLET ORAL
Qty: 1 PACKAGE | Refills: 0 | Status: SHIPPED | OUTPATIENT
Start: 2019-05-30 | End: 2020-03-06

## 2019-05-30 NOTE — PROGRESS NOTES
Subjective:       Patient ID: Ruth Harper is a 48 y.o. female.    Chief Complaint: Sinus Problem; Cough; Nasal Congestion; and pressure in ears    HPI   Pt c/o 3 days of worsening sinus/chest congestion, mild productive cough, post nasal drip, sore throat, slight fevers/chills, body aches. Minimal relief with Flonase/Astelin.   Review of Systems   Constitutional: Positive for chills, fatigue and fever. Negative for activity change, appetite change, diaphoresis and unexpected weight change.   HENT: Positive for congestion, postnasal drip, rhinorrhea, sinus pressure, sinus pain and sore throat. Negative for mouth sores, sneezing, trouble swallowing and voice change.    Eyes: Negative for pain, discharge and visual disturbance.   Respiratory: Positive for cough. Negative for shortness of breath and wheezing.    Cardiovascular: Negative for chest pain, palpitations and leg swelling.   Gastrointestinal: Negative for abdominal pain, blood in stool, constipation, diarrhea, nausea and vomiting.   Endocrine: Negative for cold intolerance and heat intolerance.   Genitourinary: Negative for difficulty urinating, dysuria, frequency, hematuria and urgency.   Musculoskeletal: Negative for arthralgias and myalgias.   Skin: Negative for rash and wound.   Allergic/Immunologic: Negative for environmental allergies and food allergies.   Neurological: Negative for dizziness, tremors, seizures, syncope, weakness, light-headedness and headaches.   Hematological: Negative for adenopathy. Does not bruise/bleed easily.   Psychiatric/Behavioral: Negative for confusion and sleep disturbance. The patient is not nervous/anxious.        Objective:      Physical Exam   Constitutional: She is oriented to person, place, and time. She appears well-developed and well-nourished. No distress.   HENT:   Head: Normocephalic and atraumatic.   Right Ear: External ear normal.   Left Ear: External ear normal.   Nose: Mucosal edema and rhinorrhea  present.   Mouth/Throat: Oropharynx is clear and moist. No oropharyngeal exudate.   Eyes: Pupils are equal, round, and reactive to light. Conjunctivae and EOM are normal. Right eye exhibits no discharge. Left eye exhibits no discharge. No scleral icterus.   Neck: Neck supple. No JVD present.   Cardiovascular: Normal rate, regular rhythm, normal heart sounds and intact distal pulses.   Pulmonary/Chest: Effort normal and breath sounds normal. No respiratory distress. She has no wheezes. She has no rales.   Musculoskeletal: She exhibits no edema.   Lymphadenopathy:     She has no cervical adenopathy.   Neurological: She is alert and oriented to person, place, and time.   Skin: Skin is warm and dry. No rash noted. She is not diaphoretic. No pallor.       Assessment:       1. Viral sinusitis    2. Acute bronchitis, unspecified organism    3. HTN, goal below 130/80        Plan:    1. Rx Medrol pack and continue Astelin/Flonase       No decongestants 2/2 HTN   2. Rx Cheratussin AC TID PRN   3. Stable, CPT

## 2019-06-24 DIAGNOSIS — Z12.11 COLON CANCER SCREENING: ICD-10-CM

## 2019-08-19 RX ORDER — ERGOCALCIFEROL 1.25 MG/1
CAPSULE ORAL
Qty: 12 CAPSULE | Refills: 0 | Status: SHIPPED | OUTPATIENT
Start: 2019-08-19 | End: 2019-11-08 | Stop reason: SDUPTHER

## 2019-11-08 RX ORDER — ERGOCALCIFEROL 1.25 MG/1
CAPSULE ORAL
Qty: 12 CAPSULE | Refills: 0 | Status: SHIPPED | OUTPATIENT
Start: 2019-11-08 | End: 2019-11-27 | Stop reason: SDUPTHER

## 2019-11-27 RX ORDER — ERGOCALCIFEROL 1.25 MG/1
50000 CAPSULE ORAL
Qty: 12 CAPSULE | Refills: 0 | Status: SHIPPED | OUTPATIENT
Start: 2019-11-27 | End: 2020-03-06

## 2020-01-29 ENCOUNTER — TELEPHONE (OUTPATIENT)
Dept: INTERNAL MEDICINE | Facility: CLINIC | Age: 50
End: 2020-01-29

## 2020-01-29 DIAGNOSIS — Z00.00 ROUTINE GENERAL MEDICAL EXAMINATION AT A HEALTH CARE FACILITY: Primary | ICD-10-CM

## 2020-01-29 NOTE — TELEPHONE ENCOUNTER
----- Message from Key Campbell sent at 1/29/2020  3:55 PM CST -----  Contact: Self [phone]  Doctor appointment and lab have been scheduled.  Please link lab orders to the lab appointment.  Date of doctor appointment:  2/3  Date of lab appointment:  1/30  Physical or EP: Physical  Comments: Patient will like to have her labs done tomorrow due to one of the Rep, told her some will call back to set up labs, pt is trying to come for 7 am, please advise

## 2020-01-30 ENCOUNTER — LAB VISIT (OUTPATIENT)
Dept: LAB | Facility: HOSPITAL | Age: 50
End: 2020-01-30
Attending: INTERNAL MEDICINE
Payer: COMMERCIAL

## 2020-01-30 DIAGNOSIS — Z00.00 ROUTINE GENERAL MEDICAL EXAMINATION AT A HEALTH CARE FACILITY: ICD-10-CM

## 2020-01-30 LAB
25(OH)D3+25(OH)D2 SERPL-MCNC: 27 NG/ML (ref 30–96)
ALBUMIN SERPL BCP-MCNC: 4 G/DL (ref 3.5–5.2)
ALP SERPL-CCNC: 57 U/L (ref 55–135)
ALT SERPL W/O P-5'-P-CCNC: 24 U/L (ref 10–44)
ANION GAP SERPL CALC-SCNC: 9 MMOL/L (ref 8–16)
AST SERPL-CCNC: 17 U/L (ref 10–40)
BASOPHILS # BLD AUTO: 0.06 K/UL (ref 0–0.2)
BASOPHILS NFR BLD: 1.1 % (ref 0–1.9)
BILIRUB SERPL-MCNC: 0.4 MG/DL (ref 0.1–1)
BUN SERPL-MCNC: 16 MG/DL (ref 6–20)
CALCIUM SERPL-MCNC: 9.8 MG/DL (ref 8.7–10.5)
CHLORIDE SERPL-SCNC: 106 MMOL/L (ref 95–110)
CHOLEST SERPL-MCNC: 225 MG/DL (ref 120–199)
CHOLEST/HDLC SERPL: 4 {RATIO} (ref 2–5)
CO2 SERPL-SCNC: 27 MMOL/L (ref 23–29)
CREAT SERPL-MCNC: 0.8 MG/DL (ref 0.5–1.4)
DIFFERENTIAL METHOD: ABNORMAL
EOSINOPHIL # BLD AUTO: 0.3 K/UL (ref 0–0.5)
EOSINOPHIL NFR BLD: 4.8 % (ref 0–8)
ERYTHROCYTE [DISTWIDTH] IN BLOOD BY AUTOMATED COUNT: 13.6 % (ref 11.5–14.5)
EST. GFR  (AFRICAN AMERICAN): >60 ML/MIN/1.73 M^2
EST. GFR  (NON AFRICAN AMERICAN): >60 ML/MIN/1.73 M^2
ESTIMATED AVG GLUCOSE: 108 MG/DL (ref 68–131)
GLUCOSE SERPL-MCNC: 89 MG/DL (ref 70–110)
HBA1C MFR BLD HPLC: 5.4 % (ref 4–5.6)
HCT VFR BLD AUTO: 44.4 % (ref 37–48.5)
HDLC SERPL-MCNC: 56 MG/DL (ref 40–75)
HDLC SERPL: 24.9 % (ref 20–50)
HGB BLD-MCNC: 13.8 G/DL (ref 12–16)
IMM GRANULOCYTES # BLD AUTO: 0.01 K/UL (ref 0–0.04)
IMM GRANULOCYTES NFR BLD AUTO: 0.2 % (ref 0–0.5)
LDLC SERPL CALC-MCNC: 136.4 MG/DL (ref 63–159)
LYMPHOCYTES # BLD AUTO: 1.6 K/UL (ref 1–4.8)
LYMPHOCYTES NFR BLD: 28.4 % (ref 18–48)
MCH RBC QN AUTO: 30.2 PG (ref 27–31)
MCHC RBC AUTO-ENTMCNC: 31.1 G/DL (ref 32–36)
MCV RBC AUTO: 97 FL (ref 82–98)
MONOCYTES # BLD AUTO: 0.4 K/UL (ref 0.3–1)
MONOCYTES NFR BLD: 6.2 % (ref 4–15)
NEUTROPHILS # BLD AUTO: 3.4 K/UL (ref 1.8–7.7)
NEUTROPHILS NFR BLD: 59.3 % (ref 38–73)
NONHDLC SERPL-MCNC: 169 MG/DL
NRBC BLD-RTO: 0 /100 WBC
PLATELET # BLD AUTO: 372 K/UL (ref 150–350)
PMV BLD AUTO: 11.4 FL (ref 9.2–12.9)
POTASSIUM SERPL-SCNC: 4.1 MMOL/L (ref 3.5–5.1)
PROT SERPL-MCNC: 7.3 G/DL (ref 6–8.4)
RBC # BLD AUTO: 4.57 M/UL (ref 4–5.4)
SODIUM SERPL-SCNC: 142 MMOL/L (ref 136–145)
TRIGL SERPL-MCNC: 163 MG/DL (ref 30–150)
TSH SERPL DL<=0.005 MIU/L-ACNC: 2.26 UIU/ML (ref 0.4–4)
WBC # BLD AUTO: 5.66 K/UL (ref 3.9–12.7)

## 2020-01-30 PROCEDURE — 80061 LIPID PANEL: CPT

## 2020-01-30 PROCEDURE — 36415 COLL VENOUS BLD VENIPUNCTURE: CPT | Mod: PO

## 2020-01-30 PROCEDURE — 83036 HEMOGLOBIN GLYCOSYLATED A1C: CPT

## 2020-01-30 PROCEDURE — 84443 ASSAY THYROID STIM HORMONE: CPT

## 2020-01-30 PROCEDURE — 85025 COMPLETE CBC W/AUTO DIFF WBC: CPT

## 2020-01-30 PROCEDURE — 80053 COMPREHEN METABOLIC PANEL: CPT

## 2020-01-30 PROCEDURE — 82306 VITAMIN D 25 HYDROXY: CPT

## 2020-02-03 ENCOUNTER — DOCUMENTATION ONLY (OUTPATIENT)
Dept: INTERNAL MEDICINE | Facility: CLINIC | Age: 50
End: 2020-02-03

## 2020-02-03 ENCOUNTER — OFFICE VISIT (OUTPATIENT)
Dept: INTERNAL MEDICINE | Facility: CLINIC | Age: 50
End: 2020-02-03
Payer: COMMERCIAL

## 2020-02-03 VITALS
HEART RATE: 74 BPM | BODY MASS INDEX: 30.01 KG/M2 | WEIGHT: 180.13 LBS | DIASTOLIC BLOOD PRESSURE: 80 MMHG | HEIGHT: 65 IN | SYSTOLIC BLOOD PRESSURE: 120 MMHG | RESPIRATION RATE: 16 BRPM | TEMPERATURE: 99 F

## 2020-02-03 DIAGNOSIS — Z00.00 ROUTINE GENERAL MEDICAL EXAMINATION AT A HEALTH CARE FACILITY: Primary | ICD-10-CM

## 2020-02-03 DIAGNOSIS — Z12.11 SCREENING FOR COLON CANCER: ICD-10-CM

## 2020-02-03 PROCEDURE — 99999 PR PBB SHADOW E&M-EST. PATIENT-LVL III: ICD-10-PCS | Mod: PBBFAC,,, | Performed by: INTERNAL MEDICINE

## 2020-02-03 PROCEDURE — 99396 PREV VISIT EST AGE 40-64: CPT | Mod: S$GLB,,, | Performed by: INTERNAL MEDICINE

## 2020-02-03 PROCEDURE — 99396 PR PREVENTIVE VISIT,EST,40-64: ICD-10-PCS | Mod: S$GLB,,, | Performed by: INTERNAL MEDICINE

## 2020-02-03 PROCEDURE — 99999 PR PBB SHADOW E&M-EST. PATIENT-LVL III: CPT | Mod: PBBFAC,,, | Performed by: INTERNAL MEDICINE

## 2020-02-03 RX ORDER — LOSARTAN POTASSIUM 100 MG/1
100 TABLET ORAL DAILY
Qty: 90 TABLET | Refills: 3 | Status: SHIPPED | OUTPATIENT
Start: 2020-02-03 | End: 2021-11-02 | Stop reason: SDUPTHER

## 2020-02-03 NOTE — PROGRESS NOTES
The patient is a 49 y.o. old female who presents to the office for a physical.  Review of labs reveals elevated cholesterol, low vitamin-D a microscopic hematuria.    PAST MEDICAL HISTORY  Past Medical History:   Diagnosis Date    AR (allergic rhinitis)     HTN (hypertension)     Hyperlipidemia        SURGICAL HISTORY:  Past Surgical History:   Procedure Laterality Date    none           MEDS:  Medcard reviewed and updated    ALLERGIES: Allergy Card reviewed and updated    SOCIAL HISTORY:   The patient is a nonsmoker, denies alcohol or illicit drug use.    ROS:  GENERAL: No fever, chills, fatigability or weight loss.  SKIN: No rashes.  HEAD: No headaches or recent head trauma.  EYES: No photophobia, ocular pain or diplopia.  EARS: Denies ear pain, discharge or vertigo.  NOSE: No epistaxis or postnasal drip.  MOUTH & THROAT: No hoarseness or change in voice.   NODES: Denies swollen glands.  CHEST: Denies shortness of breath, wheezing, cough and sputum production.  CARDIOVASCULAR: Denies chest pain or palpitations.  ABDOMEN: Appetite fine. Denies diarrhea, abdominal pain, constipation or blood in stool.  URINARY: No dysuria or hematuria.  MUSCULOSKELETAL: No joint stiffness or swelling. Denies back pain.  NEUROLOGIC: No history of seizures.  ENDOCRINE: Denies polyuria or polydipsia.  PSYCHIATRIC: Denies mood swings, depression, anxiety, homicidal or suicidal thoughts.    SCREENINGS:  Last cholesterol: 2020  Last colonoscopy: none  Last mammogram: 2019  Last Pap smear: 2019  Last tetanus: unknown  Last Pneumovax: none  Last eye exam: about 1 year ago  Last bone density: none  Last menstrual period: January 28, 2020    PE:   Vitals:  Vitals:    02/03/20 1535   BP: 120/80   Pulse: 74   Resp: 16   Temp: 98.8 °F (37.1 °C)       APPEARANCE: Well nourished, well developed, in no acute distress.    EYES: Sclerae anicteric. PERRL. EOMI.      EARS: TM's intact. No retraction or perforation.    NOSE: Mucosa pink. Airway  clear.  MOUTH & THROAT: No tonsillar enlargement. No pharyngeal erythema or exudate. No stridor.  NECK: Supple, no thyromegaly.  CHEST: Lungs clear to auscultation with unlabored respirations.  CARDIOVASCULAR: Normal S1, S2. No murmurs. No carotid bruits. No pedal edema.  ABDOMEN: Bowel sounds normal. Not distended. Soft. No tenderness or masses.   MUSCULOSKELETAL:  Normal gait, no cyanosis or clubbing.   SKIN: Normal skin turgor, warm and dry.  NEUROLOGIC: Cranial Nerves: Intact.  PSYCHIATRIC: The patient is oriented to person, place, and time and has a pleasant affect.        ASSESSMENT/PLAN:  Ruth was seen today for annual exam.    Diagnoses and all orders for this visit:    Routine general medical examination at a health care facility  -     labs reviewed  -     replace vitamin-D    Screening for colon cancer  -     Fecal Immunochemical Test (iFOBT); Future    Other orders  -     losartan (COZAAR) 100 MG tablet; Take 1 tablet (100 mg total) by mouth once daily.          Answers for HPI/ROS submitted by the patient on 2/3/2020   activity change: No  eye discharge: No  wheezing: No  chest pain: No  palpitations: No  constipation: No  vomiting: No  diarrhea: No  difficulty urinating: No  hematuria: No  headaches: No  dysphoric mood: No

## 2020-02-26 ENCOUNTER — LAB VISIT (OUTPATIENT)
Dept: LAB | Facility: HOSPITAL | Age: 50
End: 2020-02-26
Attending: INTERNAL MEDICINE
Payer: COMMERCIAL

## 2020-02-26 DIAGNOSIS — Z12.11 SCREENING FOR COLON CANCER: ICD-10-CM

## 2020-02-26 PROCEDURE — 82274 ASSAY TEST FOR BLOOD FECAL: CPT

## 2020-02-28 LAB — HEMOCCULT STL QL IA: NEGATIVE

## 2020-03-06 ENCOUNTER — OFFICE VISIT (OUTPATIENT)
Dept: URGENT CARE | Facility: CLINIC | Age: 50
End: 2020-03-06
Payer: COMMERCIAL

## 2020-03-06 VITALS
BODY MASS INDEX: 28.32 KG/M2 | HEART RATE: 97 BPM | DIASTOLIC BLOOD PRESSURE: 87 MMHG | SYSTOLIC BLOOD PRESSURE: 145 MMHG | TEMPERATURE: 100 F | RESPIRATION RATE: 19 BRPM | WEIGHT: 170 LBS | OXYGEN SATURATION: 98 % | HEIGHT: 65 IN

## 2020-03-06 DIAGNOSIS — J35.8 TONSILLAR EXUDATE: ICD-10-CM

## 2020-03-06 DIAGNOSIS — R68.89 FLU-LIKE SYMPTOMS: Primary | ICD-10-CM

## 2020-03-06 LAB
CTP QC/QA: YES
CTP QC/QA: YES
FLUAV AG NPH QL: NEGATIVE
FLUBV AG NPH QL: NEGATIVE
MOLECULAR STREP A: NEGATIVE

## 2020-03-06 PROCEDURE — 87651 POCT STREP A MOLECULAR: ICD-10-PCS | Mod: QW,S$GLB,, | Performed by: NURSE PRACTITIONER

## 2020-03-06 PROCEDURE — 99214 OFFICE O/P EST MOD 30 MIN: CPT | Mod: 25,S$GLB,, | Performed by: NURSE PRACTITIONER

## 2020-03-06 PROCEDURE — 87804 POCT INFLUENZA A/B: ICD-10-PCS | Mod: QW,S$GLB,, | Performed by: NURSE PRACTITIONER

## 2020-03-06 PROCEDURE — 87651 STREP A DNA AMP PROBE: CPT | Mod: QW,S$GLB,, | Performed by: NURSE PRACTITIONER

## 2020-03-06 PROCEDURE — 99214 PR OFFICE/OUTPT VISIT, EST, LEVL IV, 30-39 MIN: ICD-10-PCS | Mod: 25,S$GLB,, | Performed by: NURSE PRACTITIONER

## 2020-03-06 PROCEDURE — 87804 INFLUENZA ASSAY W/OPTIC: CPT | Mod: QW,S$GLB,, | Performed by: NURSE PRACTITIONER

## 2020-03-06 RX ORDER — OSELTAMIVIR PHOSPHATE 75 MG/1
75 CAPSULE ORAL 2 TIMES DAILY
Qty: 10 CAPSULE | Refills: 0 | Status: SHIPPED | OUTPATIENT
Start: 2020-03-06 | End: 2020-03-11

## 2020-03-06 NOTE — PROGRESS NOTES
"Subjective:       Patient ID: Ruth Harper is a 49 y.o. female.    Vitals:  height is 5' 5" (1.651 m) and weight is 77.1 kg (170 lb). Her oral temperature is 100.2 °F (37.9 °C). Her blood pressure is 145/87 (abnormal) and her pulse is 97. Her respiration is 19 and oxygen saturation is 98%.     Chief Complaint: URI    URI    This is a new problem. The current episode started in the past 7 days (2 days ). The problem has been unchanged. There has been no fever. Associated symptoms include congestion, coughing, headaches and sinus pain. Pertinent negatives include no abdominal pain, chest pain, diarrhea, dysuria, ear pain, joint pain, joint swelling, nausea, neck pain, plugged ear sensation, rash, rhinorrhea, sneezing, sore throat, swollen glands, vomiting or wheezing. She has tried NSAIDs for the symptoms. The treatment provided mild relief.       Constitution: Negative for chills, sweating, fatigue and fever.   HENT: Positive for congestion, sinus pain and sinus pressure. Negative for ear pain, sore throat and voice change.    Neck: Negative for neck pain and painful lymph nodes.   Cardiovascular: Negative for chest pain.   Eyes: Negative for eye redness.   Respiratory: Positive for cough. Negative for chest tightness, sputum production, bloody sputum, COPD, shortness of breath, stridor, wheezing and asthma.    Gastrointestinal: Negative for abdominal pain, nausea, vomiting and diarrhea.   Genitourinary: Negative for dysuria.   Musculoskeletal: Negative for muscle ache.   Skin: Negative for rash.   Allergic/Immunologic: Negative for seasonal allergies, asthma and sneezing.   Neurological: Positive for headaches.   Hematologic/Lymphatic: Negative for swollen lymph nodes.       Objective:      Physical Exam      Results for orders placed or performed in visit on 03/06/20   POCT Influenza A/B   Result Value Ref Range    Rapid Influenza A Ag Negative Negative    Rapid Influenza B Ag Negative Negative    Quality " "Control Acceptable Yes    POCT Strep A, Molecular   Result Value Ref Range    Molecular Strep A, POC Negative Negative     Acceptable Yes      Assessment:       1. Flu-like symptoms    2. Tonsillar exudate        Plan:         Flu-like symptoms  -     POCT Influenza A/B  -     oseltamivir (TAMIFLU) 75 MG capsule; Take 1 capsule (75 mg total) by mouth 2 (two) times daily. for 5 days  Dispense: 10 capsule; Refill: 0    Tonsillar exudate  -     POCT Strep A, Molecular         Patient Instructions   FLU LIKE ILLNESS  You presented with "flu like Symptoms" and were prescribed treatment against Influenza.  Please take meds as prescribed.  Continue symptomatic care at home.    Rest and fluids are important.   Follow up with your PCP in the next 2-3 days if no improvement.  Follow up in the ER for any worsening of symptoms  Tylenol or Motrin for fever control    "

## 2020-05-18 RX ORDER — ERGOCALCIFEROL 1.25 MG/1
CAPSULE ORAL
Qty: 12 CAPSULE | Refills: 0 | Status: SHIPPED | OUTPATIENT
Start: 2020-05-18 | End: 2020-08-03

## 2020-06-17 ENCOUNTER — TELEPHONE (OUTPATIENT)
Dept: INTERNAL MEDICINE | Facility: CLINIC | Age: 50
End: 2020-06-17

## 2020-06-17 DIAGNOSIS — Z20.822 EXPOSURE TO COVID-19 VIRUS: Primary | ICD-10-CM

## 2020-06-23 ENCOUNTER — LAB VISIT (OUTPATIENT)
Dept: LAB | Facility: HOSPITAL | Age: 50
End: 2020-06-23
Attending: INTERNAL MEDICINE
Payer: COMMERCIAL

## 2020-06-23 DIAGNOSIS — Z20.822 EXPOSURE TO COVID-19 VIRUS: ICD-10-CM

## 2020-06-23 LAB — SARS-COV-2 IGG SERPLBLD QL IA.RAPID: NEGATIVE

## 2020-06-23 PROCEDURE — 86769 SARS-COV-2 COVID-19 ANTIBODY: CPT

## 2020-06-23 PROCEDURE — 36415 COLL VENOUS BLD VENIPUNCTURE: CPT | Mod: PO

## 2020-06-29 ENCOUNTER — TELEPHONE (OUTPATIENT)
Dept: INTERNAL MEDICINE | Facility: CLINIC | Age: 50
End: 2020-06-29

## 2020-06-29 NOTE — TELEPHONE ENCOUNTER
Please inform patient that the antibody test is most likely to be effective 2-4 weeks after exposure.  Therefore, I recommend patient go to 1 of the community testing sites and be tested for COVID-19.

## 2020-06-29 NOTE — TELEPHONE ENCOUNTER
Spoke with pt who advises that she tested negative for the flu in early march.    But due to Covid outbreak, she recently had a covid antibody test on 6/23/20, which was negative.    On 6/22/20 her daughter was seen by ENT, though not having symptoms, was sent for covid testing.  She tested positive.    Mrs. Harper and her family have been quarantined for the last 7 days awaiting the above test results.    No one in the home is having symptoms of covid at this time.    She is wondering if she should get tested, though her antibody test was negative, since her daughter tested positive.    Please review and advise.    Thanks.

## 2020-08-13 ENCOUNTER — TELEPHONE (OUTPATIENT)
Dept: INTERNAL MEDICINE | Facility: CLINIC | Age: 50
End: 2020-08-13

## 2020-08-13 NOTE — TELEPHONE ENCOUNTER
Please inform patient that I am unaware of any adverse interactions between losartan and melatonin.  She should be able to take melatonin for sleep.

## 2020-08-13 NOTE — TELEPHONE ENCOUNTER
Pt states she is a  and classes start on tomorrow and she wants to take Melatonin along with Losartan please advise

## 2020-12-01 ENCOUNTER — TELEPHONE (OUTPATIENT)
Dept: INTERNAL MEDICINE | Facility: CLINIC | Age: 50
End: 2020-12-01

## 2020-12-01 DIAGNOSIS — Z00.00 ROUTINE GENERAL MEDICAL EXAMINATION AT A HEALTH CARE FACILITY: Primary | ICD-10-CM

## 2020-12-01 NOTE — TELEPHONE ENCOUNTER
----- Message from Kimberley Romero sent at 12/1/2020  2:17 PM CST -----  Contact: Pt [phone]--  Patient Requesting Order    Order Needed:--Annual lab--      Additional Information:Please place pt annual labs for 03/23/21 at 8 am and link to appointment on chart.

## 2020-12-29 RX ORDER — ERGOCALCIFEROL 1.25 MG/1
50000 CAPSULE ORAL
Qty: 12 CAPSULE | Refills: 0 | Status: SHIPPED | OUTPATIENT
Start: 2020-12-29 | End: 2021-03-24

## 2020-12-29 NOTE — TELEPHONE ENCOUNTER
----- Message from Leandra Vallejo sent at 12/29/2020  4:21 PM CST -----  Contact: Aurelia mendes/ [facility]  Requesting an RX refill or new RX.    Is this a refill or new RX: refill    RX name and strength: ergocalciferol (ERGOCALCIFEROL) 50,000 unit Cap    Is this a 30 day or 90 day RX: 90 day    Pharmacy name and phone # (copy/paste from chart):    [id number] - [place] - [facility] AT [facility] [address] [address]  Phone: [phone] Fax: [phone]      Comments:

## 2021-01-21 ENCOUNTER — TELEPHONE (OUTPATIENT)
Dept: INTERNAL MEDICINE | Facility: CLINIC | Age: 51
End: 2021-01-21

## 2021-01-21 DIAGNOSIS — Z00.00 ROUTINE GENERAL MEDICAL EXAMINATION AT A HEALTH CARE FACILITY: Primary | ICD-10-CM

## 2021-03-01 ENCOUNTER — PATIENT MESSAGE (OUTPATIENT)
Dept: ADMINISTRATIVE | Facility: HOSPITAL | Age: 51
End: 2021-03-01

## 2021-03-01 ENCOUNTER — PATIENT OUTREACH (OUTPATIENT)
Dept: ADMINISTRATIVE | Facility: HOSPITAL | Age: 51
End: 2021-03-01

## 2021-03-03 DIAGNOSIS — Z12.11 COLON CANCER SCREENING: Primary | ICD-10-CM

## 2021-03-06 ENCOUNTER — OFFICE VISIT (OUTPATIENT)
Dept: INTERNAL MEDICINE | Facility: CLINIC | Age: 51
End: 2021-03-06
Payer: COMMERCIAL

## 2021-03-06 VITALS
BODY MASS INDEX: 29.94 KG/M2 | DIASTOLIC BLOOD PRESSURE: 90 MMHG | HEIGHT: 65 IN | SYSTOLIC BLOOD PRESSURE: 140 MMHG | HEART RATE: 72 BPM | RESPIRATION RATE: 16 BRPM | WEIGHT: 179.69 LBS

## 2021-03-06 DIAGNOSIS — J02.9 PHARYNGITIS, UNSPECIFIED ETIOLOGY: ICD-10-CM

## 2021-03-06 DIAGNOSIS — J30.1 ALLERGIC RHINITIS DUE TO POLLEN, UNSPECIFIED SEASONALITY: Primary | Chronic | ICD-10-CM

## 2021-03-06 DIAGNOSIS — R09.82 POST-NASAL DRAINAGE: ICD-10-CM

## 2021-03-06 PROCEDURE — 99999 PR PBB SHADOW E&M-EST. PATIENT-LVL III: ICD-10-PCS | Mod: PBBFAC,,, | Performed by: INTERNAL MEDICINE

## 2021-03-06 PROCEDURE — 99213 OFFICE O/P EST LOW 20 MIN: CPT | Mod: S$GLB,,, | Performed by: INTERNAL MEDICINE

## 2021-03-06 PROCEDURE — 99999 PR PBB SHADOW E&M-EST. PATIENT-LVL III: CPT | Mod: PBBFAC,,, | Performed by: INTERNAL MEDICINE

## 2021-03-06 PROCEDURE — 99213 PR OFFICE/OUTPT VISIT, EST, LEVL III, 20-29 MIN: ICD-10-PCS | Mod: S$GLB,,, | Performed by: INTERNAL MEDICINE

## 2021-03-06 RX ORDER — MULTIVITAMIN
1 TABLET ORAL DAILY
COMMUNITY

## 2021-03-23 ENCOUNTER — LAB VISIT (OUTPATIENT)
Dept: LAB | Facility: HOSPITAL | Age: 51
End: 2021-03-23
Payer: COMMERCIAL

## 2021-03-23 DIAGNOSIS — Z00.00 ROUTINE GENERAL MEDICAL EXAMINATION AT A HEALTH CARE FACILITY: ICD-10-CM

## 2021-03-23 LAB
25(OH)D3+25(OH)D2 SERPL-MCNC: 28 NG/ML (ref 30–96)
ALBUMIN SERPL BCP-MCNC: 3.8 G/DL (ref 3.5–5.2)
ALP SERPL-CCNC: 67 U/L (ref 55–135)
ALT SERPL W/O P-5'-P-CCNC: 42 U/L (ref 10–44)
ANION GAP SERPL CALC-SCNC: 7 MMOL/L (ref 8–16)
AST SERPL-CCNC: 22 U/L (ref 10–40)
BASOPHILS # BLD AUTO: 0.04 K/UL (ref 0–0.2)
BASOPHILS NFR BLD: 0.7 % (ref 0–1.9)
BILIRUB SERPL-MCNC: 0.6 MG/DL (ref 0.1–1)
BUN SERPL-MCNC: 13 MG/DL (ref 6–20)
CALCIUM SERPL-MCNC: 9.7 MG/DL (ref 8.7–10.5)
CHLORIDE SERPL-SCNC: 106 MMOL/L (ref 95–110)
CHOLEST SERPL-MCNC: 224 MG/DL (ref 120–199)
CHOLEST/HDLC SERPL: 4.1 {RATIO} (ref 2–5)
CO2 SERPL-SCNC: 28 MMOL/L (ref 23–29)
CREAT SERPL-MCNC: 0.7 MG/DL (ref 0.5–1.4)
DIFFERENTIAL METHOD: ABNORMAL
EOSINOPHIL # BLD AUTO: 0.2 K/UL (ref 0–0.5)
EOSINOPHIL NFR BLD: 4.4 % (ref 0–8)
ERYTHROCYTE [DISTWIDTH] IN BLOOD BY AUTOMATED COUNT: 13.3 % (ref 11.5–14.5)
EST. GFR  (AFRICAN AMERICAN): >60 ML/MIN/1.73 M^2
EST. GFR  (NON AFRICAN AMERICAN): >60 ML/MIN/1.73 M^2
ESTIMATED AVG GLUCOSE: 108 MG/DL (ref 68–131)
FSH SERPL-ACNC: 45.4 MIU/ML
GLUCOSE SERPL-MCNC: 102 MG/DL (ref 70–110)
HBA1C MFR BLD: 5.4 % (ref 4–5.6)
HCT VFR BLD AUTO: 41 % (ref 37–48.5)
HDLC SERPL-MCNC: 54 MG/DL (ref 40–75)
HDLC SERPL: 24.1 % (ref 20–50)
HGB BLD-MCNC: 13 G/DL (ref 12–16)
IMM GRANULOCYTES # BLD AUTO: 0.01 K/UL (ref 0–0.04)
IMM GRANULOCYTES NFR BLD AUTO: 0.2 % (ref 0–0.5)
LDLC SERPL CALC-MCNC: 133 MG/DL (ref 63–159)
LYMPHOCYTES # BLD AUTO: 1.5 K/UL (ref 1–4.8)
LYMPHOCYTES NFR BLD: 27.6 % (ref 18–48)
MCH RBC QN AUTO: 29.8 PG (ref 27–31)
MCHC RBC AUTO-ENTMCNC: 31.7 G/DL (ref 32–36)
MCV RBC AUTO: 94 FL (ref 82–98)
MONOCYTES # BLD AUTO: 0.3 K/UL (ref 0.3–1)
MONOCYTES NFR BLD: 6.1 % (ref 4–15)
NEUTROPHILS # BLD AUTO: 3.3 K/UL (ref 1.8–7.7)
NEUTROPHILS NFR BLD: 61 % (ref 38–73)
NONHDLC SERPL-MCNC: 170 MG/DL
NRBC BLD-RTO: 0 /100 WBC
PLATELET # BLD AUTO: 358 K/UL (ref 150–350)
PMV BLD AUTO: 10.9 FL (ref 9.2–12.9)
POTASSIUM SERPL-SCNC: 4.9 MMOL/L (ref 3.5–5.1)
PROT SERPL-MCNC: 7 G/DL (ref 6–8.4)
RBC # BLD AUTO: 4.36 M/UL (ref 4–5.4)
SODIUM SERPL-SCNC: 141 MMOL/L (ref 136–145)
TRIGL SERPL-MCNC: 185 MG/DL (ref 30–150)
TSH SERPL DL<=0.005 MIU/L-ACNC: 2.05 UIU/ML (ref 0.4–4)
WBC # BLD AUTO: 5.4 K/UL (ref 3.9–12.7)

## 2021-03-23 PROCEDURE — 80053 COMPREHEN METABOLIC PANEL: CPT | Performed by: INTERNAL MEDICINE

## 2021-03-23 PROCEDURE — 83036 HEMOGLOBIN GLYCOSYLATED A1C: CPT | Performed by: INTERNAL MEDICINE

## 2021-03-23 PROCEDURE — 82306 VITAMIN D 25 HYDROXY: CPT | Performed by: INTERNAL MEDICINE

## 2021-03-23 PROCEDURE — 80061 LIPID PANEL: CPT | Performed by: INTERNAL MEDICINE

## 2021-03-23 PROCEDURE — 85025 COMPLETE CBC W/AUTO DIFF WBC: CPT | Performed by: INTERNAL MEDICINE

## 2021-03-23 PROCEDURE — 36415 COLL VENOUS BLD VENIPUNCTURE: CPT | Performed by: INTERNAL MEDICINE

## 2021-03-23 PROCEDURE — 83001 ASSAY OF GONADOTROPIN (FSH): CPT | Performed by: INTERNAL MEDICINE

## 2021-03-23 PROCEDURE — 84443 ASSAY THYROID STIM HORMONE: CPT | Performed by: INTERNAL MEDICINE

## 2021-04-05 ENCOUNTER — PATIENT MESSAGE (OUTPATIENT)
Dept: ADMINISTRATIVE | Facility: HOSPITAL | Age: 51
End: 2021-04-05

## 2021-04-05 ENCOUNTER — OFFICE VISIT (OUTPATIENT)
Dept: INTERNAL MEDICINE | Facility: CLINIC | Age: 51
End: 2021-04-05
Payer: COMMERCIAL

## 2021-04-05 VITALS
OXYGEN SATURATION: 97 % | WEIGHT: 181.19 LBS | BODY MASS INDEX: 30.19 KG/M2 | HEIGHT: 65 IN | DIASTOLIC BLOOD PRESSURE: 80 MMHG | HEART RATE: 82 BPM | SYSTOLIC BLOOD PRESSURE: 120 MMHG | TEMPERATURE: 98 F

## 2021-04-05 DIAGNOSIS — Z00.00 ROUTINE MEDICAL EXAM: Primary | ICD-10-CM

## 2021-04-05 PROCEDURE — 99396 PR PREVENTIVE VISIT,EST,40-64: ICD-10-PCS | Mod: S$GLB,,, | Performed by: INTERNAL MEDICINE

## 2021-04-05 PROCEDURE — 99999 PR PBB SHADOW E&M-EST. PATIENT-LVL IV: CPT | Mod: PBBFAC,,, | Performed by: INTERNAL MEDICINE

## 2021-04-05 PROCEDURE — 99396 PREV VISIT EST AGE 40-64: CPT | Mod: S$GLB,,, | Performed by: INTERNAL MEDICINE

## 2021-04-05 PROCEDURE — 99999 PR PBB SHADOW E&M-EST. PATIENT-LVL IV: ICD-10-PCS | Mod: PBBFAC,,, | Performed by: INTERNAL MEDICINE

## 2021-09-14 ENCOUNTER — CLINICAL SUPPORT (OUTPATIENT)
Dept: URGENT CARE | Facility: CLINIC | Age: 51
End: 2021-09-14
Payer: COMMERCIAL

## 2021-09-14 DIAGNOSIS — U07.1 COVID-19 VIRUS DETECTED: ICD-10-CM

## 2021-09-14 DIAGNOSIS — Z11.59 ENCOUNTER FOR SCREENING FOR OTHER VIRAL DISEASES: ICD-10-CM

## 2021-09-14 DIAGNOSIS — U07.1 COVID-19 VIRUS INFECTION: Primary | ICD-10-CM

## 2021-09-14 LAB
CTP QC/QA: YES
SARS-COV-2 RDRP RESP QL NAA+PROBE: POSITIVE

## 2021-09-14 PROCEDURE — 99211 OFF/OP EST MAY X REQ PHY/QHP: CPT | Mod: S$GLB,CS,, | Performed by: EMERGENCY MEDICINE

## 2021-09-14 PROCEDURE — 99211 PR OFFICE/OUTPT VISIT, EST, LEVL I: ICD-10-PCS | Mod: S$GLB,CS,, | Performed by: EMERGENCY MEDICINE

## 2021-09-14 PROCEDURE — U0002 COVID-19 LAB TEST NON-CDC: HCPCS | Mod: QW,S$GLB,, | Performed by: EMERGENCY MEDICINE

## 2021-09-14 PROCEDURE — U0002: ICD-10-PCS | Mod: QW,S$GLB,, | Performed by: EMERGENCY MEDICINE

## 2021-12-29 ENCOUNTER — TELEPHONE (OUTPATIENT)
Dept: INTERNAL MEDICINE | Facility: CLINIC | Age: 51
End: 2021-12-29
Payer: COMMERCIAL

## 2021-12-30 ENCOUNTER — TELEPHONE (OUTPATIENT)
Dept: INTERNAL MEDICINE | Facility: CLINIC | Age: 51
End: 2021-12-30
Payer: COMMERCIAL

## 2021-12-30 ENCOUNTER — OFFICE VISIT (OUTPATIENT)
Dept: INTERNAL MEDICINE | Facility: CLINIC | Age: 51
End: 2021-12-30
Payer: COMMERCIAL

## 2021-12-30 DIAGNOSIS — M54.32 SCIATICA OF LEFT SIDE: Primary | ICD-10-CM

## 2021-12-30 PROCEDURE — 4010F PR ACE/ARB THEARPY RXD/TAKEN: ICD-10-PCS | Mod: CPTII,95,, | Performed by: INTERNAL MEDICINE

## 2021-12-30 PROCEDURE — 3044F PR MOST RECENT HEMOGLOBIN A1C LEVEL <7.0%: ICD-10-PCS | Mod: CPTII,95,, | Performed by: INTERNAL MEDICINE

## 2021-12-30 PROCEDURE — 99213 OFFICE O/P EST LOW 20 MIN: CPT | Mod: 95,,, | Performed by: INTERNAL MEDICINE

## 2021-12-30 PROCEDURE — 1160F RVW MEDS BY RX/DR IN RCRD: CPT | Mod: CPTII,95,, | Performed by: INTERNAL MEDICINE

## 2021-12-30 PROCEDURE — 1160F PR REVIEW ALL MEDS BY PRESCRIBER/CLIN PHARMACIST DOCUMENTED: ICD-10-PCS | Mod: CPTII,95,, | Performed by: INTERNAL MEDICINE

## 2021-12-30 PROCEDURE — 3044F HG A1C LEVEL LT 7.0%: CPT | Mod: CPTII,95,, | Performed by: INTERNAL MEDICINE

## 2021-12-30 PROCEDURE — 99213 PR OFFICE/OUTPT VISIT, EST, LEVL III, 20-29 MIN: ICD-10-PCS | Mod: 95,,, | Performed by: INTERNAL MEDICINE

## 2021-12-30 PROCEDURE — 4010F ACE/ARB THERAPY RXD/TAKEN: CPT | Mod: CPTII,95,, | Performed by: INTERNAL MEDICINE

## 2021-12-30 PROCEDURE — 1159F PR MEDICATION LIST DOCUMENTED IN MEDICAL RECORD: ICD-10-PCS | Mod: CPTII,95,, | Performed by: INTERNAL MEDICINE

## 2021-12-30 PROCEDURE — 1159F MED LIST DOCD IN RCRD: CPT | Mod: CPTII,95,, | Performed by: INTERNAL MEDICINE

## 2021-12-30 RX ORDER — METHYLPREDNISOLONE 4 MG/1
TABLET ORAL
Qty: 1 EACH | Refills: 0 | Status: SHIPPED | OUTPATIENT
Start: 2021-12-30 | End: 2022-01-20

## 2022-03-21 ENCOUNTER — PATIENT MESSAGE (OUTPATIENT)
Dept: ADMINISTRATIVE | Facility: HOSPITAL | Age: 52
End: 2022-03-21
Payer: COMMERCIAL

## 2022-03-26 ENCOUNTER — OFFICE VISIT (OUTPATIENT)
Dept: URGENT CARE | Facility: CLINIC | Age: 52
End: 2022-03-26
Payer: COMMERCIAL

## 2022-03-26 VITALS
RESPIRATION RATE: 18 BRPM | OXYGEN SATURATION: 97 % | WEIGHT: 180 LBS | DIASTOLIC BLOOD PRESSURE: 92 MMHG | TEMPERATURE: 99 F | SYSTOLIC BLOOD PRESSURE: 159 MMHG | HEIGHT: 65 IN | HEART RATE: 64 BPM | BODY MASS INDEX: 29.99 KG/M2

## 2022-03-26 DIAGNOSIS — Z11.59 SCREENING FOR VIRAL DISEASE: ICD-10-CM

## 2022-03-26 DIAGNOSIS — J00 ACUTE NASOPHARYNGITIS: Primary | ICD-10-CM

## 2022-03-26 DIAGNOSIS — Z12.11 SCREENING FOR COLON CANCER: ICD-10-CM

## 2022-03-26 LAB
CTP QC/QA: YES
CTP QC/QA: YES
MOLECULAR STREP A: NEGATIVE
SARS-COV-2 RDRP RESP QL NAA+PROBE: NEGATIVE

## 2022-03-26 PROCEDURE — 3077F SYST BP >= 140 MM HG: CPT | Mod: CPTII,S$GLB,, | Performed by: NURSE PRACTITIONER

## 2022-03-26 PROCEDURE — U0002: ICD-10-PCS | Mod: QW,S$GLB,, | Performed by: NURSE PRACTITIONER

## 2022-03-26 PROCEDURE — 3080F DIAST BP >= 90 MM HG: CPT | Mod: CPTII,S$GLB,, | Performed by: NURSE PRACTITIONER

## 2022-03-26 PROCEDURE — 4010F PR ACE/ARB THEARPY RXD/TAKEN: ICD-10-PCS | Mod: CPTII,S$GLB,, | Performed by: NURSE PRACTITIONER

## 2022-03-26 PROCEDURE — 4010F ACE/ARB THERAPY RXD/TAKEN: CPT | Mod: CPTII,S$GLB,, | Performed by: NURSE PRACTITIONER

## 2022-03-26 PROCEDURE — 87651 POCT STREP A MOLECULAR: ICD-10-PCS | Mod: QW,S$GLB,, | Performed by: NURSE PRACTITIONER

## 2022-03-26 PROCEDURE — 3077F PR MOST RECENT SYSTOLIC BLOOD PRESSURE >= 140 MM HG: ICD-10-PCS | Mod: CPTII,S$GLB,, | Performed by: NURSE PRACTITIONER

## 2022-03-26 PROCEDURE — 3008F BODY MASS INDEX DOCD: CPT | Mod: CPTII,S$GLB,, | Performed by: NURSE PRACTITIONER

## 2022-03-26 PROCEDURE — 1160F PR REVIEW ALL MEDS BY PRESCRIBER/CLIN PHARMACIST DOCUMENTED: ICD-10-PCS | Mod: CPTII,S$GLB,, | Performed by: NURSE PRACTITIONER

## 2022-03-26 PROCEDURE — 3008F PR BODY MASS INDEX (BMI) DOCUMENTED: ICD-10-PCS | Mod: CPTII,S$GLB,, | Performed by: NURSE PRACTITIONER

## 2022-03-26 PROCEDURE — 99213 PR OFFICE/OUTPT VISIT, EST, LEVL III, 20-29 MIN: ICD-10-PCS | Mod: S$GLB,,, | Performed by: NURSE PRACTITIONER

## 2022-03-26 PROCEDURE — 1159F MED LIST DOCD IN RCRD: CPT | Mod: CPTII,S$GLB,, | Performed by: NURSE PRACTITIONER

## 2022-03-26 PROCEDURE — 1159F PR MEDICATION LIST DOCUMENTED IN MEDICAL RECORD: ICD-10-PCS | Mod: CPTII,S$GLB,, | Performed by: NURSE PRACTITIONER

## 2022-03-26 PROCEDURE — 3080F PR MOST RECENT DIASTOLIC BLOOD PRESSURE >= 90 MM HG: ICD-10-PCS | Mod: CPTII,S$GLB,, | Performed by: NURSE PRACTITIONER

## 2022-03-26 PROCEDURE — 87651 STREP A DNA AMP PROBE: CPT | Mod: QW,S$GLB,, | Performed by: NURSE PRACTITIONER

## 2022-03-26 PROCEDURE — 1160F RVW MEDS BY RX/DR IN RCRD: CPT | Mod: CPTII,S$GLB,, | Performed by: NURSE PRACTITIONER

## 2022-03-26 PROCEDURE — 99213 OFFICE O/P EST LOW 20 MIN: CPT | Mod: S$GLB,,, | Performed by: NURSE PRACTITIONER

## 2022-03-26 PROCEDURE — U0002 COVID-19 LAB TEST NON-CDC: HCPCS | Mod: QW,S$GLB,, | Performed by: NURSE PRACTITIONER

## 2022-03-26 NOTE — PATIENT INSTRUCTIONS
Please drink plenty of fluids.  Please get plenty of rest.  Please return here or go to the Emergency Department for any concerns or worsening of condition.  If you do not have Hypertension or any history of palpitations, it is ok to take over the counter Sudafed or Mucinex D or Allegra-D or Claritin-D or Zyrtec-D.  If you do take one of the above, it is ok to combine that with plain over the counter Mucinex or Allegra or Claritin or Zyrtec.  If for example you are taking Zyrtec -D, you can combine that with Mucinex, but not Mucinex-D.  If you are taking Mucinex-D, you can combine that with plain Allegra or Claritin or Zyrtec.   If you do have Hypertension or palpitations, it is safe to take Coricidin HBP for relief of sinus symptoms.  We recommend you take over the counter Flonase (Fluticasone) or another nasally inhaled steroid unless you are already taking one.  Nasal irrigation with a saline spray or Netti Pot like device per their directions is also recommended.  If not allergic, please take over the counter Tylenol (Acetaminophen) and/or Motrin (Ibuprofen) as directed for control of pain and/or fever.  Please follow up with your primary care doctor or specialist as needed.    If you  smoke, please stop smoking.

## 2022-03-26 NOTE — PROGRESS NOTES
"Subjective:       Patient ID: Ruth Harper is a 51 y.o. female.    Vitals:  height is 5' 5" (1.651 m) and weight is 81.6 kg (180 lb). Her temperature is 98.7 °F (37.1 °C). Her blood pressure is 159/92 (abnormal) and her pulse is 64. Her respiration is 18 and oxygen saturation is 97%.     Chief Complaint: Sore Throat    51 y.o. female presents today with a sore throat and congestion with a minor cough. Pt states her symptoms began on yesterday. Pt has only take Tylenol to assist in her pain relief but it has not help thus far. Pt rates her pain level currently at 7. Pt denies trouble swallow or exposure to strep or mono, but she is a  and she did have strep a few years ago.    Sore Throat   This is a new problem. The current episode started yesterday. The problem has been unchanged. Neither side of throat is experiencing more pain than the other. There has been no fever. The pain is at a severity of 7/10. The pain is moderate. Associated symptoms include congestion and coughing. Pertinent negatives include no abdominal pain, diarrhea, drooling, ear discharge, ear pain, headaches, hoarse voice, plugged ear sensation, neck pain, shortness of breath, stridor, swollen glands, trouble swallowing or vomiting. She has had no exposure to strep or mono. She has tried acetaminophen for the symptoms. The treatment provided no relief.       Constitution: Negative for chills, sweating, fatigue and fever.   HENT: Positive for congestion, sinus pressure and sore throat. Negative for ear pain, ear discharge, drooling, postnasal drip, sinus pain and trouble swallowing.    Neck: Negative for neck pain.   Respiratory: Positive for cough. Negative for chest tightness, sputum production, shortness of breath and stridor.    Gastrointestinal: Negative for abdominal pain, vomiting and diarrhea.   Neurological: Negative for headaches.       Objective:      Physical Exam   Constitutional: She is oriented to person, place, " and time. She appears well-developed. She is cooperative.  Non-toxic appearance. She does not appear ill. No distress.   HENT:   Head: Normocephalic and atraumatic.   Ears:   Right Ear: Hearing, tympanic membrane, external ear and ear canal normal.   Left Ear: Hearing, tympanic membrane, external ear and ear canal normal.   Nose: Mucosal edema present. No rhinorrhea or nasal deformity. No epistaxis. Right sinus exhibits no maxillary sinus tenderness and no frontal sinus tenderness. Left sinus exhibits no maxillary sinus tenderness and no frontal sinus tenderness.   Mouth/Throat: Uvula is midline, oropharynx is clear and moist and mucous membranes are normal. No trismus in the jaw. Normal dentition. No uvula swelling. Cobblestoning present. No oropharyngeal exudate, posterior oropharyngeal edema or posterior oropharyngeal erythema. Tonsils are 1+ on the right. Tonsils are 1+ on the left. No tonsillar exudate.   Eyes: Conjunctivae and lids are normal. No scleral icterus.   Neck: Trachea normal and phonation normal. Neck supple. No edema present. No erythema present. No neck rigidity present.   Cardiovascular: Normal rate, regular rhythm, normal heart sounds and normal pulses.   Pulmonary/Chest: Effort normal and breath sounds normal. No respiratory distress. She has no decreased breath sounds. She has no rhonchi.   Abdominal: Normal appearance.   Musculoskeletal: Normal range of motion.         General: No deformity. Normal range of motion.   Neurological: She is alert and oriented to person, place, and time. She exhibits normal muscle tone. Coordination normal.   Skin: Skin is warm, dry, intact, not diaphoretic and not pale.   Psychiatric: Her speech is normal and behavior is normal. Judgment and thought content normal.   Nursing note and vitals reviewed.    Results for orders placed or performed in visit on 03/26/22   POCT COVID-19 Rapid Screening   Result Value Ref Range    POC Rapid COVID Negative Negative      Acceptable Yes    POCT Strep A, Molecular   Result Value Ref Range    Molecular Strep A, POC Negative Negative     Acceptable Yes            Assessment:       1. Acute nasopharyngitis    2. Screening for viral disease          Plan:       Lab reviewed.  Acute nasopharyngitis    Screening for viral disease  -     POCT COVID-19 Rapid Screening  -     POCT Strep A, Molecular      Patient Instructions   Please drink plenty of fluids.  Please get plenty of rest.  Please return here or go to the Emergency Department for any concerns or worsening of condition.  If you do not have Hypertension or any history of palpitations, it is ok to take over the counter Sudafed or Mucinex D or Allegra-D or Claritin-D or Zyrtec-D.  If you do take one of the above, it is ok to combine that with plain over the counter Mucinex or Allegra or Claritin or Zyrtec.  If for example you are taking Zyrtec -D, you can combine that with Mucinex, but not Mucinex-D.  If you are taking Mucinex-D, you can combine that with plain Allegra or Claritin or Zyrtec.   If you do have Hypertension or palpitations, it is safe to take Coricidin HBP for relief of sinus symptoms.  We recommend you take over the counter Flonase (Fluticasone) or another nasally inhaled steroid unless you are already taking one.  Nasal irrigation with a saline spray or Netti Pot like device per their directions is also recommended.  If not allergic, please take over the counter Tylenol (Acetaminophen) and/or Motrin (Ibuprofen) as directed for control of pain and/or fever.  Please follow up with your primary care doctor or specialist as needed.    If you  smoke, please stop smoking.

## 2022-04-19 ENCOUNTER — OFFICE VISIT (OUTPATIENT)
Dept: INTERNAL MEDICINE | Facility: CLINIC | Age: 52
End: 2022-04-19
Payer: COMMERCIAL

## 2022-04-19 VITALS
TEMPERATURE: 98 F | DIASTOLIC BLOOD PRESSURE: 94 MMHG | OXYGEN SATURATION: 98 % | HEIGHT: 65 IN | WEIGHT: 178.38 LBS | BODY MASS INDEX: 29.72 KG/M2 | RESPIRATION RATE: 16 BRPM | SYSTOLIC BLOOD PRESSURE: 154 MMHG | HEART RATE: 75 BPM

## 2022-04-19 DIAGNOSIS — G57.02 PIRIFORMIS SYNDROME OF LEFT SIDE: Primary | ICD-10-CM

## 2022-04-19 DIAGNOSIS — Z00.00 ROUTINE MEDICAL EXAM: Primary | ICD-10-CM

## 2022-04-19 PROCEDURE — 3080F DIAST BP >= 90 MM HG: CPT | Mod: CPTII,S$GLB,, | Performed by: INTERNAL MEDICINE

## 2022-04-19 PROCEDURE — 4010F PR ACE/ARB THEARPY RXD/TAKEN: ICD-10-PCS | Mod: CPTII,S$GLB,, | Performed by: INTERNAL MEDICINE

## 2022-04-19 PROCEDURE — 3077F SYST BP >= 140 MM HG: CPT | Mod: CPTII,S$GLB,, | Performed by: INTERNAL MEDICINE

## 2022-04-19 PROCEDURE — 1160F PR REVIEW ALL MEDS BY PRESCRIBER/CLIN PHARMACIST DOCUMENTED: ICD-10-PCS | Mod: CPTII,S$GLB,, | Performed by: INTERNAL MEDICINE

## 2022-04-19 PROCEDURE — 99999 PR PBB SHADOW E&M-EST. PATIENT-LVL IV: CPT | Mod: PBBFAC,,, | Performed by: INTERNAL MEDICINE

## 2022-04-19 PROCEDURE — 99213 PR OFFICE/OUTPT VISIT, EST, LEVL III, 20-29 MIN: ICD-10-PCS | Mod: S$GLB,,, | Performed by: INTERNAL MEDICINE

## 2022-04-19 PROCEDURE — 99999 PR PBB SHADOW E&M-EST. PATIENT-LVL IV: ICD-10-PCS | Mod: PBBFAC,,, | Performed by: INTERNAL MEDICINE

## 2022-04-19 PROCEDURE — 1159F MED LIST DOCD IN RCRD: CPT | Mod: CPTII,S$GLB,, | Performed by: INTERNAL MEDICINE

## 2022-04-19 PROCEDURE — 3008F PR BODY MASS INDEX (BMI) DOCUMENTED: ICD-10-PCS | Mod: CPTII,S$GLB,, | Performed by: INTERNAL MEDICINE

## 2022-04-19 PROCEDURE — 3008F BODY MASS INDEX DOCD: CPT | Mod: CPTII,S$GLB,, | Performed by: INTERNAL MEDICINE

## 2022-04-19 PROCEDURE — 1160F RVW MEDS BY RX/DR IN RCRD: CPT | Mod: CPTII,S$GLB,, | Performed by: INTERNAL MEDICINE

## 2022-04-19 PROCEDURE — 4010F ACE/ARB THERAPY RXD/TAKEN: CPT | Mod: CPTII,S$GLB,, | Performed by: INTERNAL MEDICINE

## 2022-04-19 PROCEDURE — 3080F PR MOST RECENT DIASTOLIC BLOOD PRESSURE >= 90 MM HG: ICD-10-PCS | Mod: CPTII,S$GLB,, | Performed by: INTERNAL MEDICINE

## 2022-04-19 PROCEDURE — 99213 OFFICE O/P EST LOW 20 MIN: CPT | Mod: S$GLB,,, | Performed by: INTERNAL MEDICINE

## 2022-04-19 PROCEDURE — 3077F PR MOST RECENT SYSTOLIC BLOOD PRESSURE >= 140 MM HG: ICD-10-PCS | Mod: CPTII,S$GLB,, | Performed by: INTERNAL MEDICINE

## 2022-04-19 PROCEDURE — 1159F PR MEDICATION LIST DOCUMENTED IN MEDICAL RECORD: ICD-10-PCS | Mod: CPTII,S$GLB,, | Performed by: INTERNAL MEDICINE

## 2022-04-19 RX ORDER — GABAPENTIN 100 MG/1
100 CAPSULE ORAL NIGHTLY
Qty: 30 CAPSULE | Refills: 11 | Status: SHIPPED | OUTPATIENT
Start: 2022-04-19 | End: 2022-05-06

## 2022-04-19 RX ORDER — METHYLPREDNISOLONE 4 MG/1
TABLET ORAL
Qty: 21 EACH | Refills: 0 | Status: SHIPPED | OUTPATIENT
Start: 2022-04-19 | End: 2022-05-10

## 2022-04-19 NOTE — PROGRESS NOTES
CC: sciatica  HPI:  The patient is a 51 y.o. year old female who presents to the office for sciatica.  Her symptoms started a week ago, but worsened over the weekend.  The pain is a shooting sensation that radiated down her left leg that originates from her buttocks.  She also reports intermittent numbness and tingling.  Pain is 4-10/10.  She denies any known trauma.  She has taken aleve, ibuprofen and tylenol without relief.      PAST MEDICAL HISTORY:  Past Medical History:   Diagnosis Date    AR (allergic rhinitis)     HTN (hypertension)     Hyperlipidemia        SURGICAL HISTORY:  Past Surgical History:   Procedure Laterality Date    none         MEDS:  Medcard reviewed and updated    ALLERGIES: Allergy Card reviewed and updated    SOCIAL HISTORY:   The patient is a nonsmoker.    PE:   APPEARANCE: Well nourished, well developed, in no acute distress.    CHEST: Lungs clear to auscultation with unlabored respirations.  CARDIOVASCULAR: Normal S1, S2. No murmurs. No carotid bruits. No pedal edema.  ABDOMEN: Bowel sounds normal. Not distended. Soft. No tenderness or masses. .  MUSCULOSKELETAL:  Normal gait.  Positive straight leg raise on the left.  PSYCHIATRIC: The patient is oriented to person, place, and time and has a pleasant affect.        ASSESSMENT/PLAN:  Ruth was seen today for sciatica.    Diagnoses and all orders for this visit:    Piriformis syndrome of left side  -     prescribed Medrol Dosepak and gabapentin    Other orders  -     methylPREDNISolone (MEDROL DOSEPACK) 4 mg tablet; use as directed  -     gabapentin (NEURONTIN) 100 MG capsule; Take 1 capsule (100 mg total) by mouth every evening.

## 2022-04-27 ENCOUNTER — PATIENT MESSAGE (OUTPATIENT)
Dept: INTERNAL MEDICINE | Facility: CLINIC | Age: 52
End: 2022-04-27
Payer: COMMERCIAL

## 2022-05-02 ENCOUNTER — PATIENT MESSAGE (OUTPATIENT)
Dept: INTERNAL MEDICINE | Facility: CLINIC | Age: 52
End: 2022-05-02
Payer: COMMERCIAL

## 2022-05-04 NOTE — TELEPHONE ENCOUNTER
Pt requesting refill of Gabapentin and states for the sig to be changed to 2 times a day.  LOV:04/19/2022  RTC:05/20/2022

## 2022-05-04 NOTE — TELEPHONE ENCOUNTER
----- Message from Ana Mcguire sent at 5/4/2022 10:47 AM CDT -----  Contact: Ruth [phone]  Needs call back. It's regarding her pain medication. She states doctor increased the dose and now Pharmacy states it's to early to refill. Patient will need run out before refill. Rx for gabapentin (NEURONTIN) 100 MG capsule

## 2022-05-05 NOTE — TELEPHONE ENCOUNTER
Pt requesting refill of Gabapentin and states for the sig to be increased to 2 times a day.  LOV:04/19/2022  RTC:05/20/2022

## 2022-05-05 NOTE — TELEPHONE ENCOUNTER
----- Message from Fay Ordonez sent at 5/5/2022  8:52 AM CDT -----  Regarding: new rx needed  Contact: patient [phone]  2nd request.   Dosage for medication was increased 2 pills day.  Patient has run out of medication and pharmacy will not refill.  Patient is leaving to go out of town today and needs refill as soon as possible. Please call and advise.      [id number] - [place] [facility] [address] [place] [address]  Phone: [phone] Fax: [phone]

## 2022-05-06 RX ORDER — GABAPENTIN 100 MG/1
100 CAPSULE ORAL 2 TIMES DAILY
Qty: 60 CAPSULE | Refills: 11 | Status: SHIPPED | OUTPATIENT
Start: 2022-05-06 | End: 2022-05-20

## 2022-05-10 ENCOUNTER — PATIENT MESSAGE (OUTPATIENT)
Dept: INTERNAL MEDICINE | Facility: CLINIC | Age: 52
End: 2022-05-10
Payer: COMMERCIAL

## 2022-05-10 DIAGNOSIS — G57.02 PIRIFORMIS SYNDROME OF LEFT SIDE: Primary | ICD-10-CM

## 2022-05-11 ENCOUNTER — LAB VISIT (OUTPATIENT)
Dept: LAB | Facility: HOSPITAL | Age: 52
End: 2022-05-11
Attending: INTERNAL MEDICINE
Payer: COMMERCIAL

## 2022-05-11 DIAGNOSIS — Z00.00 ROUTINE MEDICAL EXAM: ICD-10-CM

## 2022-05-11 LAB
BILIRUB UR QL STRIP: NEGATIVE
CLARITY UR REFRACT.AUTO: CLEAR
COLOR UR AUTO: YELLOW
GLUCOSE UR QL STRIP: NEGATIVE
HGB UR QL STRIP: NEGATIVE
KETONES UR QL STRIP: NEGATIVE
LEUKOCYTE ESTERASE UR QL STRIP: ABNORMAL
MICROSCOPIC COMMENT: NORMAL
NITRITE UR QL STRIP: NEGATIVE
PH UR STRIP: 5 [PH] (ref 5–8)
PROT UR QL STRIP: NEGATIVE
RBC #/AREA URNS AUTO: 0 /HPF (ref 0–4)
SP GR UR STRIP: 1.02 (ref 1–1.03)
URN SPEC COLLECT METH UR: ABNORMAL
WBC #/AREA URNS AUTO: 3 /HPF (ref 0–5)

## 2022-05-11 PROCEDURE — 81001 URINALYSIS AUTO W/SCOPE: CPT | Performed by: INTERNAL MEDICINE

## 2022-05-16 NOTE — TELEPHONE ENCOUNTER
Recommend referral to pain clinic.  Also, recommend patient try increasing gabapentin to 300 mg twice daily.  Also, I can send in a short course of hydrocodone to be taken at night.  Please advise if patient would like to try this and her preferred pharmacy.

## 2022-05-18 LAB — HEMOCCULT STL QL IA: NEGATIVE

## 2022-05-20 ENCOUNTER — OFFICE VISIT (OUTPATIENT)
Dept: INTERNAL MEDICINE | Facility: CLINIC | Age: 52
End: 2022-05-20
Payer: COMMERCIAL

## 2022-05-20 VITALS
RESPIRATION RATE: 17 BRPM | WEIGHT: 178.38 LBS | SYSTOLIC BLOOD PRESSURE: 122 MMHG | HEART RATE: 88 BPM | HEIGHT: 65 IN | TEMPERATURE: 98 F | DIASTOLIC BLOOD PRESSURE: 82 MMHG | OXYGEN SATURATION: 96 % | BODY MASS INDEX: 29.72 KG/M2

## 2022-05-20 DIAGNOSIS — M25.552 LEFT HIP PAIN: Primary | ICD-10-CM

## 2022-05-20 DIAGNOSIS — Z00.00 ROUTINE MEDICAL EXAM: Primary | ICD-10-CM

## 2022-05-20 PROCEDURE — 3044F HG A1C LEVEL LT 7.0%: CPT | Mod: CPTII,S$GLB,, | Performed by: INTERNAL MEDICINE

## 2022-05-20 PROCEDURE — 4010F PR ACE/ARB THEARPY RXD/TAKEN: ICD-10-PCS | Mod: CPTII,S$GLB,, | Performed by: INTERNAL MEDICINE

## 2022-05-20 PROCEDURE — 99396 PREV VISIT EST AGE 40-64: CPT | Mod: S$GLB,,, | Performed by: INTERNAL MEDICINE

## 2022-05-20 PROCEDURE — 3079F PR MOST RECENT DIASTOLIC BLOOD PRESSURE 80-89 MM HG: ICD-10-PCS | Mod: CPTII,S$GLB,, | Performed by: INTERNAL MEDICINE

## 2022-05-20 PROCEDURE — 1160F RVW MEDS BY RX/DR IN RCRD: CPT | Mod: CPTII,S$GLB,, | Performed by: INTERNAL MEDICINE

## 2022-05-20 PROCEDURE — 99999 PR PBB SHADOW E&M-EST. PATIENT-LVL IV: CPT | Mod: PBBFAC,,, | Performed by: INTERNAL MEDICINE

## 2022-05-20 PROCEDURE — 1160F PR REVIEW ALL MEDS BY PRESCRIBER/CLIN PHARMACIST DOCUMENTED: ICD-10-PCS | Mod: CPTII,S$GLB,, | Performed by: INTERNAL MEDICINE

## 2022-05-20 PROCEDURE — 1159F PR MEDICATION LIST DOCUMENTED IN MEDICAL RECORD: ICD-10-PCS | Mod: CPTII,S$GLB,, | Performed by: INTERNAL MEDICINE

## 2022-05-20 PROCEDURE — 3044F PR MOST RECENT HEMOGLOBIN A1C LEVEL <7.0%: ICD-10-PCS | Mod: CPTII,S$GLB,, | Performed by: INTERNAL MEDICINE

## 2022-05-20 PROCEDURE — 3008F BODY MASS INDEX DOCD: CPT | Mod: CPTII,S$GLB,, | Performed by: INTERNAL MEDICINE

## 2022-05-20 PROCEDURE — 3074F PR MOST RECENT SYSTOLIC BLOOD PRESSURE < 130 MM HG: ICD-10-PCS | Mod: CPTII,S$GLB,, | Performed by: INTERNAL MEDICINE

## 2022-05-20 PROCEDURE — 4010F ACE/ARB THERAPY RXD/TAKEN: CPT | Mod: CPTII,S$GLB,, | Performed by: INTERNAL MEDICINE

## 2022-05-20 PROCEDURE — 99396 PR PREVENTIVE VISIT,EST,40-64: ICD-10-PCS | Mod: S$GLB,,, | Performed by: INTERNAL MEDICINE

## 2022-05-20 PROCEDURE — 3079F DIAST BP 80-89 MM HG: CPT | Mod: CPTII,S$GLB,, | Performed by: INTERNAL MEDICINE

## 2022-05-20 PROCEDURE — 3074F SYST BP LT 130 MM HG: CPT | Mod: CPTII,S$GLB,, | Performed by: INTERNAL MEDICINE

## 2022-05-20 PROCEDURE — 1159F MED LIST DOCD IN RCRD: CPT | Mod: CPTII,S$GLB,, | Performed by: INTERNAL MEDICINE

## 2022-05-20 PROCEDURE — 3008F PR BODY MASS INDEX (BMI) DOCUMENTED: ICD-10-PCS | Mod: CPTII,S$GLB,, | Performed by: INTERNAL MEDICINE

## 2022-05-20 PROCEDURE — 99999 PR PBB SHADOW E&M-EST. PATIENT-LVL IV: ICD-10-PCS | Mod: PBBFAC,,, | Performed by: INTERNAL MEDICINE

## 2022-05-20 RX ORDER — TIZANIDINE 4 MG/1
4 TABLET ORAL NIGHTLY
Qty: 30 TABLET | Refills: 3 | Status: SHIPPED | OUTPATIENT
Start: 2022-05-20 | End: 2022-05-30

## 2022-05-20 RX ORDER — IBUPROFEN 800 MG/1
800 TABLET ORAL 3 TIMES DAILY
COMMUNITY
Start: 2022-05-12

## 2022-05-20 RX ORDER — GABAPENTIN 300 MG/1
300 CAPSULE ORAL 2 TIMES DAILY
Qty: 60 CAPSULE | Refills: 3 | Status: SHIPPED | OUTPATIENT
Start: 2022-05-20 | End: 2023-01-09

## 2022-05-20 NOTE — PROGRESS NOTES
The patient is a 51 y.o. old female who presents to the office for a physical.  Review of labs reveals an elevated cholesterol, HbA1c and elevated ALT    PAST MEDICAL HISTORY  Past Medical History:   Diagnosis Date    AR (allergic rhinitis)     HTN (hypertension)     Hyperlipidemia        SURGICAL HISTORY:  Past Surgical History:   Procedure Laterality Date    none           MEDS:  Medcard reviewed and updated    ALLERGIES: Allergy Card reviewed and updated    SOCIAL HISTORY:   The patient is a nonsmoker, denies alcohol or illicit drug use.    ROS:  GENERAL: No fever, chills, fatigability or weight loss.  SKIN: No rashes.  HEAD: No headaches or recent head trauma.  EYES: No photophobia, ocular pain or diplopia.  EARS: Denies ear pain, discharge or vertigo.  NOSE: No epistaxis or postnasal drip.  MOUTH & THROAT: No hoarseness or change in voice.   NODES: Denies swollen glands.  CHEST: Denies shortness of breath, wheezing, cough and sputum production.  CARDIOVASCULAR: Denies chest pain or palpitations.  ABDOMEN: Appetite fine. Denies diarrhea, abdominal pain, constipation or blood in stool.  URINARY: No flank pain, dysuria or hematuria.  MUSCULOSKELETAL: No joint stiffness or swelling. Denies back pain.  NEUROLOGIC: No history of seizures.  ENDOCRINE: Denies polyuria or polydipsia.  PSYCHIATRIC: Denies mood swings, depression, anxiety, homicidal or suicidal thoughts.    SCREENINGS:  Last cholesterol: 2022  Last colonoscopy/ iFOBT: 2022  Last mammogram: 2021  Last Pap smear: 2016  Last tetanus: 2008  Last Pneumovax: none  Last eye exam: about 3 months ago  Last bone density: none  Last menstrual period: over 1 year ago    PE:   Vitals:  Vitals:    05/20/22 1416   BP: 122/82   Pulse: 88   Resp: 17   Temp: 98.1 °F (36.7 °C)       APPEARANCE: Well nourished, well developed, in no acute distress.    EYES: Sclerae anicteric. PERRL. EOMI.      EARS: TM's intact. No retraction or perforation.    NOSE: Mucosa pink. Airway  clear.  MOUTH & THROAT: No tonsillar enlargement. No pharyngeal erythema or exudate. No stridor.  NECK: Supple, no thyromegaly.  NODES: No cervical, axillary or inguinal lymph node enlargement.  CHEST: Lungs clear to auscultation with unlabored respirations.  CARDIOVASCULAR: Normal S1, S2. No murmurs. No carotid bruits. No pedal edema.  ABDOMEN: Bowel sounds normal. Not distended. Soft. No tenderness or masses. No organomegaly.  MUSCULOSKELETAL:  Normal gait, no cyanosis or clubbing.  SKIN: Normal skin turgor, warm and dry.  NEUROLOGIC: Cranial Nerves: Intact.  PSYCHIATRIC: The patient is oriented to person, place, and time and has a pleasant affect.        ASSESSMENT/PLAN:  Ruth was seen today for annual exam and scatic nerve .    Diagnoses and all orders for this visit:    Routine medical exam  -     labs reviewed  -     encouraged healthy diet and regular exercise    Other orders  -     gabapentin (NEURONTIN) 300 MG capsule; Take 1 capsule (300 mg total) by mouth 2 (two) times daily.  -     tiZANidine (ZANAFLEX) 4 MG tablet; Take 1 tablet (4 mg total) by mouth nightly. for 10 days          The 10-year ASCVD risk score (Vilma JEAN Jr., et al., 2013) is: 2.2%    Values used to calculate the score:      Age: 51 years      Sex: Female      Is Non- : No      Diabetic: No      Tobacco smoker: No      Systolic Blood Pressure: 122 mmHg      Is BP treated: Yes      HDL Cholesterol: 57 mg/dL      Total Cholesterol: 244 mg/dL      Answers for HPI/ROS submitted by the patient on 5/19/2022  activity change: Yes  unexpected weight change: No  neck pain: No  hearing loss: No  rhinorrhea: No  trouble swallowing: No  eye discharge: No  visual disturbance: No  chest tightness: No  wheezing: No  chest pain: No  palpitations: No  blood in stool: No  constipation: No  vomiting: No  diarrhea: No  polydipsia: No  polyuria: No  difficulty urinating: No  hematuria: No  menstrual problem: No  dysuria: No  joint  swelling: No  arthralgias: No  headaches: No  weakness: No  confusion: No  dysphoric mood: No

## 2022-05-23 ENCOUNTER — OFFICE VISIT (OUTPATIENT)
Dept: PAIN MEDICINE | Facility: CLINIC | Age: 52
End: 2022-05-23
Payer: COMMERCIAL

## 2022-05-23 VITALS
HEIGHT: 65 IN | SYSTOLIC BLOOD PRESSURE: 143 MMHG | WEIGHT: 178 LBS | HEART RATE: 81 BPM | BODY MASS INDEX: 29.66 KG/M2 | DIASTOLIC BLOOD PRESSURE: 85 MMHG | RESPIRATION RATE: 18 BRPM

## 2022-05-23 DIAGNOSIS — M54.16 LUMBAR RADICULOPATHY: ICD-10-CM

## 2022-05-23 DIAGNOSIS — M70.72 ISCHIAL BURSITIS OF LEFT SIDE: Primary | ICD-10-CM

## 2022-05-23 DIAGNOSIS — G57.02 PIRIFORMIS SYNDROME, LEFT: ICD-10-CM

## 2022-05-23 PROCEDURE — 4010F ACE/ARB THERAPY RXD/TAKEN: CPT | Mod: CPTII,S$GLB,, | Performed by: STUDENT IN AN ORGANIZED HEALTH CARE EDUCATION/TRAINING PROGRAM

## 2022-05-23 PROCEDURE — 99204 OFFICE O/P NEW MOD 45 MIN: CPT | Mod: S$GLB,,, | Performed by: STUDENT IN AN ORGANIZED HEALTH CARE EDUCATION/TRAINING PROGRAM

## 2022-05-23 PROCEDURE — 3077F PR MOST RECENT SYSTOLIC BLOOD PRESSURE >= 140 MM HG: ICD-10-PCS | Mod: CPTII,S$GLB,, | Performed by: STUDENT IN AN ORGANIZED HEALTH CARE EDUCATION/TRAINING PROGRAM

## 2022-05-23 PROCEDURE — 3044F PR MOST RECENT HEMOGLOBIN A1C LEVEL <7.0%: ICD-10-PCS | Mod: CPTII,S$GLB,, | Performed by: STUDENT IN AN ORGANIZED HEALTH CARE EDUCATION/TRAINING PROGRAM

## 2022-05-23 PROCEDURE — 3079F DIAST BP 80-89 MM HG: CPT | Mod: CPTII,S$GLB,, | Performed by: STUDENT IN AN ORGANIZED HEALTH CARE EDUCATION/TRAINING PROGRAM

## 2022-05-23 PROCEDURE — 99999 PR PBB SHADOW E&M-EST. PATIENT-LVL III: CPT | Mod: PBBFAC,,, | Performed by: STUDENT IN AN ORGANIZED HEALTH CARE EDUCATION/TRAINING PROGRAM

## 2022-05-23 PROCEDURE — 3079F PR MOST RECENT DIASTOLIC BLOOD PRESSURE 80-89 MM HG: ICD-10-PCS | Mod: CPTII,S$GLB,, | Performed by: STUDENT IN AN ORGANIZED HEALTH CARE EDUCATION/TRAINING PROGRAM

## 2022-05-23 PROCEDURE — 3008F PR BODY MASS INDEX (BMI) DOCUMENTED: ICD-10-PCS | Mod: CPTII,S$GLB,, | Performed by: STUDENT IN AN ORGANIZED HEALTH CARE EDUCATION/TRAINING PROGRAM

## 2022-05-23 PROCEDURE — 1159F PR MEDICATION LIST DOCUMENTED IN MEDICAL RECORD: ICD-10-PCS | Mod: CPTII,S$GLB,, | Performed by: STUDENT IN AN ORGANIZED HEALTH CARE EDUCATION/TRAINING PROGRAM

## 2022-05-23 PROCEDURE — 1159F MED LIST DOCD IN RCRD: CPT | Mod: CPTII,S$GLB,, | Performed by: STUDENT IN AN ORGANIZED HEALTH CARE EDUCATION/TRAINING PROGRAM

## 2022-05-23 PROCEDURE — 1160F RVW MEDS BY RX/DR IN RCRD: CPT | Mod: CPTII,S$GLB,, | Performed by: STUDENT IN AN ORGANIZED HEALTH CARE EDUCATION/TRAINING PROGRAM

## 2022-05-23 PROCEDURE — 4010F PR ACE/ARB THEARPY RXD/TAKEN: ICD-10-PCS | Mod: CPTII,S$GLB,, | Performed by: STUDENT IN AN ORGANIZED HEALTH CARE EDUCATION/TRAINING PROGRAM

## 2022-05-23 PROCEDURE — 3044F HG A1C LEVEL LT 7.0%: CPT | Mod: CPTII,S$GLB,, | Performed by: STUDENT IN AN ORGANIZED HEALTH CARE EDUCATION/TRAINING PROGRAM

## 2022-05-23 PROCEDURE — 99999 PR PBB SHADOW E&M-EST. PATIENT-LVL III: ICD-10-PCS | Mod: PBBFAC,,, | Performed by: STUDENT IN AN ORGANIZED HEALTH CARE EDUCATION/TRAINING PROGRAM

## 2022-05-23 PROCEDURE — 3077F SYST BP >= 140 MM HG: CPT | Mod: CPTII,S$GLB,, | Performed by: STUDENT IN AN ORGANIZED HEALTH CARE EDUCATION/TRAINING PROGRAM

## 2022-05-23 PROCEDURE — 1160F PR REVIEW ALL MEDS BY PRESCRIBER/CLIN PHARMACIST DOCUMENTED: ICD-10-PCS | Mod: CPTII,S$GLB,, | Performed by: STUDENT IN AN ORGANIZED HEALTH CARE EDUCATION/TRAINING PROGRAM

## 2022-05-23 PROCEDURE — 99204 PR OFFICE/OUTPT VISIT, NEW, LEVL IV, 45-59 MIN: ICD-10-PCS | Mod: S$GLB,,, | Performed by: STUDENT IN AN ORGANIZED HEALTH CARE EDUCATION/TRAINING PROGRAM

## 2022-05-23 PROCEDURE — 3008F BODY MASS INDEX DOCD: CPT | Mod: CPTII,S$GLB,, | Performed by: STUDENT IN AN ORGANIZED HEALTH CARE EDUCATION/TRAINING PROGRAM

## 2022-05-23 NOTE — PROGRESS NOTES
Chronic Pain - New Consult    Referring Physician: Montse Harris MD    Date: 05/23/2022     Re: Ruth Harper  MR#: [id number]  YOB: 1970  Age: 51 y.o.    Chief Complaint:   Chief Complaint   Patient presents with    Leg Pain     left    Rectal Pain     **This note is dictated using the M*Modal Fluency Direct word recognition program. There are word recognition mistakes that are occasionally missed on review.**    ASSESSMENT: 51 y.o. year old female with left buttock pain, consistent with     1. Ischial bursitis of left side  Ambulatory referral/consult to Physical/Occupational Therapy   2. Piriformis syndrome, left     3. Lumbar radiculopathy           PLAN:     Left ischial bursitis  -discussed treatment options including PT, mediations, injections  -defers ischial bursa injection  -trial PT referral  -try getting a nice cushion to sit on  -continue ibuprofen, gabapentin, muscle relaxer  -if pain persists then would recommend hip XR and/or MRI to r/o other pathology    Left Hip pain  -less likely OA    Left piriformis syndrome  -less likely, but would be 2nd on my differential if ischial bursa injection does not improve    Left radiculopathy  -possible but less likely at this point.  Can reassess if other treatments not helpful    - RTC 1 month  - Counseled patient regarding the importance of weight loss and activity modification and physical therapy.    The above plan and management options were discussed at length with patient. Patient is in agreement with the above and verbalized understanding. It will be communicated with the referring physician via electronic record, fax, or mail.  Lab/study reports reviewed were important and necessary because subsequent medical and treatment recommendations required review of the above lab/study reports. Images viewed/reviewed above were important and necessary because subsequent medical and treatment recommendations required review of the reviewed  image(s).     Electronically signed by:  Tunde Venegas DO  05/23/2022    =========================================================================================================    SUBJECTIVE:    Ruth Harper is a 51 y.o. female presents to the clinic for the evaluation of buttocks pain. The pain started 5 months ago following no inciting event and symptoms have been worsening.  After oral steroids the pain seemed to improve, but then the pain returned. She had never had this type of pain before.  It is affecting her sleep, ability to move around.  She feels like she should be getting some sort of relief, but nothing has helped yet.    Pain is worst at the bottom of the buttocks.  Worse with when she gets up in the morning.  Better with movement during the day.  Getting in and out the bed, putting on pants, tying the shoe make it worse. She sometimes get radiation in to the leg which happens several times a day.  Usually it stops behind the knee.  Sometimes it radiates into the ankle.    She has a hx of left lateral hip pain worse with laying on that side in 2018 that self resolved.    Pain Description:    The pain is located in the buttocks area and radiates to the left leg.    At BEST  4/10   At WORST  10/10 on the WORST day.    On average pain is rated as 5/10.   Today the pain is rated as 6/10  The pain is continuous.  The pain is described as sharp and shooting    Symptoms interfere with daily activity and sleeping.   Exacerbating factors: Standing, Bending, Touching, Walking, Flexing, Lifting and Getting out of bed/chair.    Mitigating factors nothing.   She reports 5 hours of sleep per night.    Physical Therapy/Home Exercise: No, not currently in physical therapy or home exercise program    Current Pain Medications:     - ibuprofen 800mg, gabapentin 300mg BID, tizanidine 4mg qhs    Failed Pain Medications:    - ibuprofen 800mg, gabapentin 300mg BID, tizanidine 4mg qhs, medrol dose barron      Pain  Treatment Therapies:    Pain procedures: none  Physical Therapy: years ago for plantar fasciitis.  Chiropractor: none  Acupuncture: none  TENS unit: none  Spinal decompression: none  Joint replacement: none    Patient denies urinary incontinence, bowel incontinence, significant motor weakness and loss of sensations.  Patient denies any suicidal or homicidal ideations     report:  Not applicable    Imaging:   Left hip XR 2018:    AP view of the pelvis with both hips in neutral position and frogleg lateral view of the left hip are provided for review.     Femoral heads maintain rounded contour and project over their acetabula on all views.  Joint spaces are preserved at the hips.     I detect no fracture, dislocation, radiopaque retained foreign body, lytic or blastic lesion, erosion or chondrocalcinosis.     Sacroiliac joints appear patent in their imaged extents.     Tiny calcifications projected over the inferior aspect of the true pelvis suggest phleboliths; if there is concern for urinary tract calculus, renal stone CT would provide further information.     The source of the patient's left hip pain is not established on this study.  However variety of processes that cause hip and pelvic pain may be occult on radiographs.  If pain or clinical concern persists consider MRI for more sensitive assessment.     IMPRESSION:      Please see above.    Past Medical History:   Diagnosis Date    AR (allergic rhinitis)     HTN (hypertension)     Hyperlipidemia      Past Surgical History:   Procedure Laterality Date    none       Social History     Socioeconomic History    Marital status:    Tobacco Use    Smoking status: Never Smoker    Smokeless tobacco: Never Used   Substance and Sexual Activity    Alcohol use: Yes     Comment: social    Drug use: No     Social Determinants of Health     Financial Resource Strain: Low Risk     Difficulty of Paying Living Expenses: Not hard at all   Food Insecurity: No  Food Insecurity    Worried About Running Out of Food in the Last Year: Never true    Ran Out of Food in the Last Year: Never true   Transportation Needs: No Transportation Needs    Lack of Transportation (Medical): No    Lack of Transportation (Non-Medical): No   Physical Activity: Insufficiently Active    Days of Exercise per Week: 3 days    Minutes of Exercise per Session: 30 min   Stress: No Stress Concern Present    Feeling of Stress : Only a little   Social Connections: Unknown    Frequency of Communication with Friends and Family: Three times a week    Frequency of Social Gatherings with Friends and Family: Once a week    Active Member of Clubs or Organizations: Yes    Attends Club or Organization Meetings: More than 4 times per year    Marital Status:    Housing Stability: Low Risk     Unable to Pay for Housing in the Last Year: No    Number of Places Lived in the Last Year: 1    Unstable Housing in the Last Year: No     Family History   Problem Relation Age of Onset    Diabetes Father     Hypertension Father     Stroke Maternal Grandmother     Macular degeneration Maternal Grandmother     Breast cancer Paternal Grandmother     Breast cancer Paternal Aunt        Review of patient's allergies indicates:   Allergen Reactions    Penicillins Hives       Current Outpatient Medications   Medication Sig    ergocalciferol (ERGOCALCIFEROL) 50,000 unit Cap TAKE 1 CAPSULE BY MOUTH EVERY 7 DAYS    fluticasone propionate (FLONASE) 50 mcg/actuation nasal spray 2  SPRAYS  IN  EACH NOSTRIL  QD    gabapentin (NEURONTIN) 300 MG capsule Take 1 capsule (300 mg total) by mouth 2 (two) times daily.    ibuprofen (ADVIL,MOTRIN) 800 MG tablet Take 800 mg by mouth 3 (three) times daily.    losartan (COZAAR) 100 MG tablet TAKE 1 TABLET(100 MG) BY MOUTH EVERY DAY    multivitamin (THERAGRAN) per tablet Take 1 tablet by mouth once daily.    tiZANidine (ZANAFLEX) 4 MG tablet Take 1 tablet (4 mg total) by  "mouth nightly. for 10 days     No current facility-administered medications for this visit.       REVIEW OF SYSTEMS:    GENERAL:  No weight loss, malaise or fevers.  HEENT:   No recent changes in vision or hearing  NECK:  Negative for lumps, no difficulty with swallowing.  RESPIRATORY:  Negative for cough, wheezing or shortness of breath, patient denies any recent URI.  CARDIOVASCULAR:  Negative for chest pain, leg swelling or palpitations.  GI:  Negative for abdominal discomfort, blood in stools or black stools or change in bowel habits.  MUSCULOSKELETAL:  See HPI.  SKIN:  Negative for lesions, rash, and itching.  PSYCH:  No mood disorder or recent psychosocial stressors.  Patients sleep is not disturbed secondary to pain.  HEMATOLOGY/LYMPHOLOGY:  Negative for prolonged bleeding, bruising easily or swollen nodes.  Patient is not currently taking any anti-coagulants  NEURO:   No history of headaches, syncope, paralysis, seizures or tremors.  All other reviewed and negative other than HPI.    OBJECTIVE:    BP (!) 143/85   Pulse 81   Resp 18   Ht 5' 5" (1.651 m)   Wt 80.7 kg (178 lb)   BMI 29.62 kg/m²     PHYSICAL EXAMINATION:    GENERAL: Well appearing, in no acute distress, alert and oriented x3.  PSYCH:  Mood and affect appropriate.  SKIN: Skin color, texture, turgor normal, no rashes or lesions.  HEAD/FACE:  Normocephalic, atraumatic. Cranial nerves grossly intact.  CV: RRR with palpation of the radial artery.  PULM: CTAB. No evidence of respiratory difficulty, symmetric chest rise.  GI:  Soft and non-tender.    BACK:   - No obvious deformity or signs of trauma, Normal lumbar lordotic curve  - Negative spinous process tenderness  - Negative paravertebral tenderness  - Negative pain to palpation over the facet joints of the lumbar spine.   - Negative QL / Iliac crest / Glut tenderness  - Slump test is Positive for radicular pain at the ischial bursa  - Slump test is Negative for back pain  - Supine Straight " leg raising is Positive for radicular pain at the ischial bursa and hamstring  - Supine Straight leg raising is Negative for back pain  - Lumbar ROM is diminished in Flexion with pain at the ischial bursa  - Lumbar ROM is normal in Extension without pain  - Lumbar ROM is normal in Lateral Flexion without pain  - Lumbar ROM is normal in Rotation without pain  - Did not perform Sustained Hip Flexion test (for discogenic pain)  - Negative Altered Gait, Posture  - Axial facet loading test Negative on the bilateral side(s)  -(+) pain over the LEFT Ischial bursa with palpation    SI Joint exam:  - Negative SI joint tenderness to palpation  - Rene's sign Positive with pain at the ischial bursa  - Yeoman's Test: Did not perform for SI joint pain indicating anterior SI ligament involvement. Did not perform for anterior thigh pain/paresthesia which indicates femoral nerve stretch.  - Gaenslen's Test:Positive with pain at the ischial bursa  - Finger Derrick's Sign:Negative  - SI compression test:Did not perform  - SI distraction test:Did not perform  - Thigh Thrust: Positive with pain at the ischial bursa  - SI Thrust: Did not perform    MUSKULOSKELETAL:    EXTREMITIES:   Hip Exam:  - Log Roll Negative  - FADIR Negative  - Stinchfield Positive with pain at the ischial bursa  - Hip Scour Positive with pain at the ischial bursa  - GTB Tenderness Negative      MUSCULOSKELETAL:  No atrophy or tone abnormalities are noted in the UE or LE.  No deformities, edema, or skin discoloration are noted on visible skin. Good capillary refill.    NEURO: Bilateral upper and lower extremity coordination and muscle stretch reflexes are physiologic and symmetric.      NEUROLOGICAL EXAM:  MENTAL STATUS: A x O x 3, good concentration, speech is fluent and goal directed  MEMORY: recent and remote are intact  CN: CN2-12 grossly intact  MOTOR: 5/5 in all muscle groups  DTRs: 2+ intact symmetric  Sensation:    -no Loss of sensation in a left lower  and right lower L-1, L-2, L-3, L-4 and L-5 bilaterally distribution.  Babinski: absent on the bilateral side(s)      GAIT: normal.

## 2022-05-25 ENCOUNTER — OFFICE VISIT (OUTPATIENT)
Dept: SPORTS MEDICINE | Facility: CLINIC | Age: 52
End: 2022-05-25
Payer: COMMERCIAL

## 2022-05-25 ENCOUNTER — HOSPITAL ENCOUNTER (OUTPATIENT)
Dept: RADIOLOGY | Facility: HOSPITAL | Age: 52
Discharge: HOME | End: 2022-05-25
Attending: FAMILY MEDICINE
Payer: COMMERCIAL

## 2022-05-25 VITALS — TEMPERATURE: 99 F | WEIGHT: 178 LBS | HEIGHT: 65 IN | BODY MASS INDEX: 29.66 KG/M2

## 2022-05-25 DIAGNOSIS — M25.552 LEFT HIP PAIN: ICD-10-CM

## 2022-05-25 DIAGNOSIS — M76.892 HAMSTRING TENDINITIS OF LEFT THIGH: Primary | ICD-10-CM

## 2022-05-25 DIAGNOSIS — M16.12 PRIMARY OSTEOARTHRITIS OF LEFT HIP: ICD-10-CM

## 2022-05-25 DIAGNOSIS — M67.80 TENDINOSIS: ICD-10-CM

## 2022-05-25 PROCEDURE — 1160F PR REVIEW ALL MEDS BY PRESCRIBER/CLIN PHARMACIST DOCUMENTED: ICD-10-PCS | Mod: CPTII,S$GLB,, | Performed by: FAMILY MEDICINE

## 2022-05-25 PROCEDURE — 4010F PR ACE/ARB THEARPY RXD/TAKEN: ICD-10-PCS | Mod: CPTII,S$GLB,, | Performed by: FAMILY MEDICINE

## 2022-05-25 PROCEDURE — 3044F PR MOST RECENT HEMOGLOBIN A1C LEVEL <7.0%: ICD-10-PCS | Mod: CPTII,S$GLB,, | Performed by: FAMILY MEDICINE

## 2022-05-25 PROCEDURE — 99204 OFFICE O/P NEW MOD 45 MIN: CPT | Mod: S$GLB,,, | Performed by: FAMILY MEDICINE

## 2022-05-25 PROCEDURE — 4010F ACE/ARB THERAPY RXD/TAKEN: CPT | Mod: CPTII,S$GLB,, | Performed by: FAMILY MEDICINE

## 2022-05-25 PROCEDURE — 1159F PR MEDICATION LIST DOCUMENTED IN MEDICAL RECORD: ICD-10-PCS | Mod: CPTII,S$GLB,, | Performed by: FAMILY MEDICINE

## 2022-05-25 PROCEDURE — 1160F RVW MEDS BY RX/DR IN RCRD: CPT | Mod: CPTII,S$GLB,, | Performed by: FAMILY MEDICINE

## 2022-05-25 PROCEDURE — 99999 PR PBB SHADOW E&M-EST. PATIENT-LVL IV: ICD-10-PCS | Mod: PBBFAC,,, | Performed by: FAMILY MEDICINE

## 2022-05-25 PROCEDURE — 73521 XR HIPS BILATERAL 2 VIEW INCL AP PELVIS: ICD-10-PCS | Mod: 26,,, | Performed by: RADIOLOGY

## 2022-05-25 PROCEDURE — 73521 X-RAY EXAM HIPS BI 2 VIEWS: CPT | Mod: 26,,, | Performed by: RADIOLOGY

## 2022-05-25 PROCEDURE — 3008F PR BODY MASS INDEX (BMI) DOCUMENTED: ICD-10-PCS | Mod: CPTII,S$GLB,, | Performed by: FAMILY MEDICINE

## 2022-05-25 PROCEDURE — 99204 PR OFFICE/OUTPT VISIT, NEW, LEVL IV, 45-59 MIN: ICD-10-PCS | Mod: S$GLB,,, | Performed by: FAMILY MEDICINE

## 2022-05-25 PROCEDURE — 3044F HG A1C LEVEL LT 7.0%: CPT | Mod: CPTII,S$GLB,, | Performed by: FAMILY MEDICINE

## 2022-05-25 PROCEDURE — 99999 PR PBB SHADOW E&M-EST. PATIENT-LVL IV: CPT | Mod: PBBFAC,,, | Performed by: FAMILY MEDICINE

## 2022-05-25 PROCEDURE — 3008F BODY MASS INDEX DOCD: CPT | Mod: CPTII,S$GLB,, | Performed by: FAMILY MEDICINE

## 2022-05-25 PROCEDURE — 73521 X-RAY EXAM HIPS BI 2 VIEWS: CPT | Mod: TC

## 2022-05-25 PROCEDURE — 1159F MED LIST DOCD IN RCRD: CPT | Mod: CPTII,S$GLB,, | Performed by: FAMILY MEDICINE

## 2022-05-25 RX ORDER — MELOXICAM 7.5 MG/1
7.5 TABLET ORAL DAILY
Qty: 15 TABLET | Refills: 0 | Status: CANCELLED | OUTPATIENT
Start: 2022-05-25

## 2022-05-25 RX ORDER — MELOXICAM 15 MG/1
15 TABLET ORAL DAILY
Qty: 15 TABLET | Refills: 0 | Status: SHIPPED | OUTPATIENT
Start: 2022-05-25

## 2022-05-25 NOTE — PROGRESS NOTES
Ruth Harper, a 51 y.o. female, is here for evaluation of left hip.     HISTORY OF PRESENT ILLNESS   Location: proximal thigh   Onset: chronic, insidious  Palliative:    relative rest   oral analgesics, OTC    Provocative: prolonged ambulation  Prior: none  Progression: plateau discomfort   Quality: sharp  Radiation: none  Severity: per nursing documentation  Timing: intermittent with use  Trauma: none    Review of systems (ROS):  A 10+ review of systems was performed with pertinent positives and negatives noted above in the history of present illness. Other systems were negative unless otherwise specified.    PHYSICAL EXAMINATION  General:  The patient is alert and oriented x 3.  Mood is pleasant.  Observation of ears, eyes and nose reveal no gross abnormalities.  HEENT: NCAT, sclera nonicteric  Lungs: Respirations are equal and unlabored.   Gait is coordinated. Patient can toe walk and heel walk without difficulty.    HIP/PELVIS EXAMINATION    Observation/Inspection  Gait:   Nonantalgic   Alignment:  Neutral   Scars:   None   Muscle atrophy: None   Effusion:  None   Warmth:  None   Discoloration:   None   Leg lengths:   Equal   Pelvis:    Level     Tenderness/Crepitus (T/C):      T / C  Trochanteric bursa   - / -  Piriformis    - / -  SI joint    - / -  Psoas tendon   - / -  Rectus insertion  - / -  Adductor insertion  - / -  Pubic symphysis  - / -    ROM: (* = pain)    Flexion:      120 degrees  External rotation:   40 degrees  Internal rotation with axial load:  30 degrees  Internal rotation without axial load:  40 degrees  Abduction:    45 degrees  Adduction:     20 degrees    Special Tests:  Pain w/ forced internal rotation (FADIR):  -   Pain w/ forced external rotation (ELIAS):  -   Circumduction test:     -  Stinchfield test:     -   Log roll:       -   Snapping hip (internal):    -   Sit-up pain:      -   Resisted sit-up pain:     -   Resisted sit-up with adductor contraction pain:  -   Step-down  test:     +  Trendelenburg test:     -  Bridge test      +     Extremity Neuro-vascular Examination:   Sensation:  Grossly intact to light touch all dermatomal regions.   Motor Function:  Fully intact motor function at hip, knee, foot and ankle    DTRs;  quadriceps and  achilles 2+.  No clonus and downgoing Babinski.    Vascular status:  DP and PT pulses 2+, brisk capillary refill, symmetric.    Skin:  intact, compartments soft.    Other Findings:    ASSESSMENT & PLAN  Assessment  #1 Tonnis Grade II osteoarthritis of hip, left   W/ tendinotic changes of proximal conjoined tendon    No evidence of neurologic pathology  No evidence of vascular pathology    Imaging studies reviewed:  X-ray pelvis and hip, bilat 22.05    Plan  We discussed the importance of appropriate diet, weight, and regular exercise    We discussed options including    Watchful waiting / relative rest x   Physical therapy x   Injection therapy    Consultation    The patient chooses As above   x = prescribed  CSI = corticosteroid injection  VSI = viscosupplement injection  PRPI = platelet rich plasma injection  ia = intra articular  R = right  L = left  B = bilateral   nfSx = surgical consultation was recommended, but patient is not interested in consultation at this time    Physical Therapy        Formal (fPT), @ [facility] facility b   Formal (fPT), @ [facility] facility        Homegoing (hgPT), per concurrent fPT recommendations    Homegoing (hgPT), per prior fPT recommendations    Homegoing (hgPT), handout provided        w/  (atPT)    [blank] = not prescribed  x = prescribed  b = prescribed, and begin as indicated  t = continue as indicated  r = prescribed, and restart as indicated  p = completed prior as indicated  hs = prescribed, and with high school   col = prescribed, and with college or university   nfPT = physical therapy was recommended, but patient is not interested in PT at this time    Activity  (e.g. sports, work) restrictions    [blank] = as tolerated  pt = per physical therapist  at = per   NWB = non weight bearing on affected lower extremity, with crutches assistance for ambulation    Bracing    [blank] = not prescribed  r = recommended, but not fit with at todays visit  f = prescribed and fit with at todays visit  t = continue as indicated  d = d/c  p = as needed  rare = use on rare, as-needed basis; advised against chronic use    Pain management m   [blank] = No prescription necessary. A handout detailing dosing of appropriate   over-the-counter musculoskeletal analgesics was made available to the patient.   m = meloxicam x 14 days  mp = 14 day course of meloxicam prescribed prior    Follow up 4   [blank] = as needed  [number] = in [number] weeks  CSI = for corticosteroid injection  VSI = for viscosupplement injection or injection series  PRP = for platelet rich plasma injection or injection series  MRI = after MRI imaging  ns = should surgical options be deferred (no surgery)  o = appointment offered, deferred by patient    Should symptoms worsen or fail to resolve, consider    Revisiting the above options and / or CSI prx conjoined hamstring tend left     Vocation:   teacher

## 2022-06-06 ENCOUNTER — CLINICAL SUPPORT (OUTPATIENT)
Dept: REHABILITATION | Facility: HOSPITAL | Age: 52
End: 2022-06-06
Attending: STUDENT IN AN ORGANIZED HEALTH CARE EDUCATION/TRAINING PROGRAM
Payer: COMMERCIAL

## 2022-06-06 DIAGNOSIS — M25.652 DECREASED RANGE OF LEFT HIP MOVEMENT: Primary | ICD-10-CM

## 2022-06-06 DIAGNOSIS — R53.1 DECREASED STRENGTH: ICD-10-CM

## 2022-06-06 DIAGNOSIS — R29.3 POSTURE ABNORMALITY: ICD-10-CM

## 2022-06-06 DIAGNOSIS — M70.72 ISCHIAL BURSITIS OF LEFT SIDE: ICD-10-CM

## 2022-06-06 PROCEDURE — 97140 MANUAL THERAPY 1/> REGIONS: CPT

## 2022-06-06 PROCEDURE — 97110 THERAPEUTIC EXERCISES: CPT

## 2022-06-06 PROCEDURE — 97161 PT EVAL LOW COMPLEX 20 MIN: CPT

## 2022-06-08 PROBLEM — R53.1 DECREASED STRENGTH: Status: ACTIVE | Noted: 2022-06-08

## 2022-06-08 PROBLEM — R29.3 POSTURE ABNORMALITY: Status: ACTIVE | Noted: 2022-06-08

## 2022-06-08 PROBLEM — M25.652 DECREASED RANGE OF LEFT HIP MOVEMENT: Status: ACTIVE | Noted: 2022-06-08

## 2022-06-08 NOTE — PLAN OF CARE
[facility]  Physical Therapy Initial Evaluation    Date: 6/6/2022   Name: Ruth Harper  Clinic Number: [id number]    Therapy Diagnosis:   Encounter Diagnoses   Name Primary?    Ischial bursitis of left side     Decreased range of left hip movement Yes    Decreased strength     Posture abnormality      Physician: Tunde Venegas,*    Physician Orders: PT Eval and Treat   Medical Diagnosis from Referral: M70.72 (ICD-10-CM) - Ischial bursitis of left side  Evaluation Date: 6/6/2022  Authorization Period Expiration: 5/23/2023  Plan of Care Expiration: 8/1/2022  Visit # / Visits authorized: 1/1    Time In: 1000a  Time Out: 1100a  Total Appointment Time (timed & untimed codes): 60 minutes    Precautions: Standard     Subjective   Date of onset: January 2022   History of current condition - Ruth reports: pain in her L gluteal region and it traveling down to her L calf with bending over. Has received 4 different diagnoses since January. Does not remember a specific incident. Gets worse with reaching to the floor to  items/ prolonged sitting/standing and sit<>stand. Also has pain with coughing and sneezing. Pt also reports pain with in the morning when getting out of bed. Pt denies any parasthesias, loss of balance, falls, and bladder/bowel changes. Some relief found with standing after prolonged sitting. Mentioned recent weight gain since onset of LE pain. Pt denies any b/b changes; sensation/strength changes. Pt denies any LOB/falls.      Medical History:   Past Medical History:   Diagnosis Date    AR (allergic rhinitis)     HTN (hypertension)     Hyperlipidemia        Surgical History:   Ruth Harper  has a past surgical history that includes none.    Medications:   Ruth has a current medication list which includes the following prescription(s): ergocalciferol, fluticasone propionate, gabapentin, ibuprofen, losartan, meloxicam, and multivitamin.    Allergies:    Review of patient's allergies indicates:   Allergen Reactions    Penicillins Hives        Imaging, X-ray B hips (5/25/2022): Right: No fracture dislocation bone destruction seen. Left: No fracture dislocation bone destruction seen.    Prior Therapy: None  Occupation: teacher   Prior Level of Function: Ind; able to complete daily work and life activities w/out limitations   Current Level of Function: ind; difficulty with sit or stands/bending over.     Pain:  Current 2/10, worst 5/10, best 2/10   Location: left  buttocks   Description: Burning and Grabbing  Aggravating Factors: Sitting, Bending and Coughing/Sneezing  Easing Factors: lying down and heating pad    Pt's goals: Get rid of pain and stiffness with sitting and transitional movements. Be able to work all day without aggravating pain.     Objective     Posture:   Flat thoracic spine  Lordotic at L4/L5    Gait pattern:  Decreased hip ext  Bilat Trendelenburg gait  Decreased step length    Hip Joint Mobility: hypomobility in hip and thoracic region     Lumbar ROM:   Flexion: 50% reproduces pain down posterior leg  Extension: 100%; no pain  SB: 75%; no pain   Lumbar segments to be assessed next visit     Lumbar Special Tests:    Quadrant: -    SLR: L + at 50 degrees, R -     Lower Extremity Strength    Right LE  Left LE    Hip flexion: 4+/5 Hip flexion: 4+/5   Hip extension:  4-/5 Hip extension: 3/5   Hip abduction: 3+/5 Hip abduction: 3+/5     Joint Mobility: decreased segmental mobility in thoracic spine and decreased hip extension B     Sensation: intact to light touch in B LE dermatomes      Functional Testing:   Balance Assessment:       Evaluation   Single Limb Stance R LE 10s  (<10 sec = HIGH FALL RISK)   Single Limb Stance L LE 7s  (<10 sec = HIGH FALL RISK)         30 second chair rise: 13 reps with no UE support        Limitation/Restriction for FOTO Hip Survey    Therapist reviewed FOTO scores for Ruth MAGDA Harper on 6/6/2022.   FOTO documents  entered into EPIC - see Media section.    Limitation Score: 33%  Predicted: 20%        TREATMENT   Treatment Time In: 1040a  Treatment Time Out: 1100a  Total Treatment time (time-based codes) separate from Evaluation: 20 minutes    Ruth received therapeutic exercises to develop strength, endurance and ROM for 12 minutes including:    SL Open Books x 20 B   Seated Nerve Glides in slump position X 20   Education - pain-free nerve sliders / lifting mechanics     Ruth received the following manual therapy techniques: Joint mobilizations were applied to the: hip/thoracic for 8 minutes, including:    Pt agreeable to manipulation     Long axis hip distraction manipulation   Prone thoracic manipulation to multiple levels      Home Exercises and Patient Education Provided    Education provided:   - HEP   - no tensioners (squatting, quick bending over)     Written Home Exercises Provided: yes.  Exercises were reviewed and Ruth was able to demonstrate them prior to the end of the session.  Ruth demonstrated good  understanding of the education provided.     See EMR under Patient Instructions for exercises provided 6/6/2022.    Assessment   Ruth is a 51 y.o. female referred to outpatient Physical Therapy with a medical diagnosis of Ischial bursitis of left side. Pt presents with s/s of adverse neural tension with an underlying discogenic pathology given subjective complaints, LE weakness, and functional limitations of difficulty with sit<>stands and bending over. Pt would benefit from skilled PT to improve functional deficits/neural and joint mobility, in order to improve QOL.     Pt prognosis is Good.   Pt will benefit from skilled outpatient Physical Therapy to address the deficits stated above and in the chart below, provide pt/family education, and to maximize pt's level of independence.     Plan of care discussed with patient: Yes  Pt's spiritual, cultural and educational needs considered and patient is  agreeable to the plan of care and goals as stated below:     Anticipated Barriers for therapy: none    Medical Necessity is demonstrated by the following  History  Co-morbidities and personal factors that may impact the plan of care Co-morbidities:   high BMI and HTN    Personal Factors:   no deficits     low   Examination  Body Structures and Functions, activity limitations and participation restrictions that may impact the plan of care Body Regions:   lower extremities  trunk    Body Systems:    gross symmetry  ROM  strength  gross coordinated movement  balance  gait  transfers  transitions    Participation Restrictions:   None    Activity limitations:   no deficits    General Tasks and Commands  no deficits    Communication  no deficits    Mobility  walking  sit<>stands    Self care  no deficits    Domestic Life  doing house work (cleaning house, washing dishes, laundry)    Interactions/Relationships  no deficits    Life Areas  no deficits    Community and Social Life  community life  recreation and leisure         moderate   Clinical Presentation stable and uncomplicated low   Decision Making/ Complexity Score: low     Goals:  GOALS: Short Term Goals:  4 weeks  1.Report decreased knee pain  < / =  2/10  to increase tolerance for work and running activities  2. Pt will demonstrate a (-) Slump test to demonstrate decreased neural mechanosensivity.  3. Increase strength by 1/3 MMT grade in quad strength  to increase tolerance for ADL and work activities.  4. Pt to tolerate HEP to improve ROM and independence with ADL's     Long Term Goals: 8 weeks  1.Report decreased knee pain < / = 0/10  to increase tolerance for work and running activities  2.Patient goal: no pain with sit<>stands / teaching tasks / lifting from the floor.   3.Increase strength to >/= 4+/5 in quad/hip  to increase tolerance for ADL and work activities.  4. Pt will report a 20% impaired on FOTO hip to demonstrate increase in LE function with every  day tasks.     Plan   Plan of care Certification: 6/6/2022 to 8/6/2022.    Assess deep core activation     Outpatient Physical Therapy 2 times weekly for 8 weeks to include the following interventions: Gait Training, Manual Therapy, Moist Heat/ Ice, Neuromuscular Re-ed, Patient Education, Therapeutic Activities and Therapeutic Exercise.     Manuel Deras, PT

## 2022-06-09 ENCOUNTER — CLINICAL SUPPORT (OUTPATIENT)
Dept: REHABILITATION | Facility: HOSPITAL | Age: 52
End: 2022-06-09
Payer: COMMERCIAL

## 2022-06-09 DIAGNOSIS — M25.652 DECREASED RANGE OF LEFT HIP MOVEMENT: Primary | ICD-10-CM

## 2022-06-09 DIAGNOSIS — R29.3 POSTURE ABNORMALITY: ICD-10-CM

## 2022-06-09 DIAGNOSIS — R53.1 DECREASED STRENGTH: ICD-10-CM

## 2022-06-09 PROCEDURE — 97110 THERAPEUTIC EXERCISES: CPT

## 2022-06-09 PROCEDURE — 97112 NEUROMUSCULAR REEDUCATION: CPT

## 2022-06-09 PROCEDURE — 97140 MANUAL THERAPY 1/> REGIONS: CPT

## 2022-06-09 NOTE — PROGRESS NOTES
Physical Therapy Treatment Note     Name: Ruth Harper  Clinic Number: [id number]    Therapy Diagnosis:   Encounter Diagnoses   Name Primary?    Decreased range of left hip movement Yes    Decreased strength     Posture abnormality      Physician: Tunde Venegas,*    Visit Date: 6/9/2022    Physician Orders: PT Eval and Treat   Medical Diagnosis from Referral: M70.72 (ICD-10-CM) - Ischial bursitis of left side  Evaluation Date: 6/6/2022  Authorization Period Expiration:12/31/2022  Plan of Care Expiration: 8/1/2022  Visit # / Visits authorized: 1/24     Time In: 1000a  Time Out: 1100a  Total Appointment Time (timed & untimed codes): 60 minutes     Precautions: Standard     Initial FOTO: 33% (20% predicted)   FOTO 1st F/U:   FOTO 2nd F/U:   Subjective     Pt reports: she is doing fine. Pt states she felt a little sore after last visit.   She was compliant with home exercise program.  Response to previous treatment: n/a  Functional change: ongoing    Pain: 2/10  Location: left buttocks      Objective     Assessment (6/9):    Lumbar segmental mobility: decreased mobility L1-L3   Slump: (+) -20 knee ext pre-tx; (+) -10 knee ext post-tx   HS length: WNL B     Ruth received therapeutic exercises to develop strength, endurance and ROM for 30 minutes including:     SL Open Books x 20 B   Supine Thoracic ext on foam roll x30; 5sec   Standing Thread the needle x 20 B   Seated Nerve Glides in slump position X 20   Supine Bridges 3 x 10   Education - pain-free nerve sliders / lifting mechanics      Ruth received the following manual therapy techniques: Joint mobilizations were applied to the: hip/thoracic for 15 minutes, including:     Pt agreeable to manipulation      Long axis hip distraction manipulation   Prone thoracic manipulation to multiple levels  Lumbar gapping mob; L2-L4   Lumbar flexion mob L1-L3    Ruth participated in neuromuscular re-education activities to improve: Coordination,  Kinesthetic, Sense and Proprioception for 15 minutes. The following activities were included:    Supine TA contraction [id number] holds   Supine BKFO 2 x 10   SL Clams with TA contraction GTB 2 x 15 B         Home Exercises Provided and Patient Education Provided     Education provided:   - HEP   - mobility exercises 2x/day   - no tensioners.; pain-free nerve mobility     Written Home Exercises Provided: yes.  Exercises were reviewed and Ruth was able to demonstrate them prior to the end of the session.  Ruth demonstrated good  understanding of the education provided.     See EMR under Patient Instructions for exercises provided 6/9/2022.    Assessment   Ruth tolerated her first tx well. No adverse effects. Pt states she feels like she is improving. Most progress made with thoracic mobs today. Will continue to work on core activation and mobility to improve adverse neural tension.     Ruth Is progressing well towards her goals.   Pt prognosis is Good.     Pt will continue to benefit from skilled outpatient physical therapy to address the deficits listed in the problem list box on initial evaluation, provide pt/family education and to maximize pt's level of independence in the home and community environment.     Pt's spiritual, cultural and educational needs considered and pt agreeable to plan of care and goals.     Anticipated barriers to physical therapy: none    Goals:  GOALS: Short Term Goals:  4 weeks  1.Report decreased knee pain  < / =  2/10  to increase tolerance for work and running activities  2. Pt will demonstrate a (-) Slump test to demonstrate decreased neural mechanosensivity.  3. Increase strength by 1/3 MMT grade in quad strength  to increase tolerance for ADL and work activities.  4. 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   3.Increase strength to >/= 4+/5 in quad/hip  to increase tolerance for ADL and work activities.  4. Pt will report a 20% impaired on FOTO hip to demonstrate increase in LE function with every day tasks.      Plan   Plan of care Certification: 6/6/2022 to 8/6/2022.     Assess deep core activation     Manuel Deras, PT

## 2022-06-16 ENCOUNTER — CLINICAL SUPPORT (OUTPATIENT)
Dept: REHABILITATION | Facility: HOSPITAL | Age: 52
End: 2022-06-16
Payer: COMMERCIAL

## 2022-06-16 DIAGNOSIS — M25.652 DECREASED RANGE OF LEFT HIP MOVEMENT: Primary | ICD-10-CM

## 2022-06-16 DIAGNOSIS — R29.3 POSTURE ABNORMALITY: ICD-10-CM

## 2022-06-16 DIAGNOSIS — R53.1 DECREASED STRENGTH: ICD-10-CM

## 2022-06-16 DIAGNOSIS — M70.72 ISCHIAL BURSITIS OF LEFT SIDE: ICD-10-CM

## 2022-06-16 PROCEDURE — 97140 MANUAL THERAPY 1/> REGIONS: CPT

## 2022-06-16 PROCEDURE — 97112 NEUROMUSCULAR REEDUCATION: CPT

## 2022-06-16 PROCEDURE — 97110 THERAPEUTIC EXERCISES: CPT

## 2022-06-16 NOTE — PROGRESS NOTES
Physical Therapy Treatment Note     Name: Ruth Harper  Clinic Number: [id number]    Therapy Diagnosis:   Encounter Diagnoses   Name Primary?    Decreased range of left hip movement Yes    Decreased strength     Posture abnormality     Ischial bursitis of left side      Physician: Tunde Venegas,*    Visit Date: 6/16/2022    Physician Orders: PT Eval and Treat   Medical Diagnosis from Referral: M70.72 (ICD-10-CM) - Ischial bursitis of left side  Evaluation Date: 6/6/2022  Authorization Period Expiration:12/31/2022  Plan of Care Expiration: 8/1/2022  Visit # / Visits authorized: 2/24     Time In: 1000a  Time Out: 1100a  Total Appointment Time (timed & untimed codes): 60 minutes     Precautions: Standard     Initial FOTO: 33% (20% predicted)   FOTO 1st F/U:   FOTO 2nd F/U:   Subjective     Pt reports: she felt so much better the few days after last treatment but Monday she had really bad calf pain and was unsure what she did. Pt states that getting into and out of her car was very bothersome.     She was compliant with home exercise program.  Response to previous treatment: n/a  Functional change: ongoing    Pain: 2/10  Location: left buttocks      Objective     Assessment (6/9):    Lumbar segmental mobility: decreased mobility L1-L3   Slump: (+) -20 knee ext pre-tx; (+) -10 knee ext post-tx   HS length: WNL B     Ruth received therapeutic exercises to develop strength, endurance and ROM for 30 minutes including:     SL Open Books x 20 B   Supine Thoracic ext on foam roll x30; 5sec   Standing Thread the needle x 20 B - hold   Seated Nerve Glides in slump position 2 X 20   Supine Bridges 3 x 10   Standing Hip Abd 2# 2 x 10 B   Education - pain-free nerve sliders / lifting mechanics      Ruth received the following manual therapy techniques: Joint mobilizations were applied to the: hip/thoracic for 15 minutes, including:     Pt agreeable to manipulation      Long axis hip distraction manipulation    Prone thoracic manipulation to multiple levels  Lumbar gapping mob; L2-L4   Lumbar flexion mob L1-L3    Ruth participated in neuromuscular re-education activities to improve: Coordination, Kinesthetic, Sense and Proprioception for 15 minutes. The following activities were included:    Supine TA contraction [id number] holds   Supine Marching 2 x 10   Supine March with 6# ball lift over head 2 x 10         Home Exercises Provided and Patient Education Provided     Education provided:   - HEP   - mobility exercises 2x/day   - no tensioners.; pain-free nerve mobility     Written Home Exercises Provided: yes.  Exercises were reviewed and Ruth was able to demonstrate them prior to the end of the session.  Ruth demonstrated good  understanding of the education provided.     See EMR under Patient Instructions for exercises provided 6/9/2022.    Assessment   Ruth demonstrated an improved Slump test again this session following thoracic mobs, even more so after lumbar gapping manip. Pt educated to avoid flexion + rotation with car transfers and to continue to work on mobility exercises/nerve glides at home. Pt educated to hold on thread the needle exercise.    Ruth Is progressing well towards her goals.   Pt prognosis is Good.     Pt will continue to benefit from skilled outpatient physical therapy to address the deficits listed in the problem list box on initial evaluation, provide pt/family education and to maximize pt's level of independence in the home and community environment.     Pt's spiritual, cultural and educational needs considered and pt agreeable to plan of care and goals.     Anticipated barriers to physical therapy: none    Goals:  GOALS: Short Term Goals:  4 weeks  1.Report decreased knee pain  < / =  2/10  to increase tolerance for work and running activities  2. Pt will demonstrate a (-) Slump test to demonstrate decreased neural mechanosensivity.  3. Increase strength by 1/3 MMT grade  in quad strength  to increase tolerance for ADL and work activities.  4. 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   3.Increase strength to >/= 4+/5 in quad/hip  to increase tolerance for ADL and work activities.  4. Pt will report a 20% impaired on FOTO hip to demonstrate increase in LE function with every day tasks.      Plan   Plan of care Certification: 6/6/2022 to 8/6/2022.     Assess deep core activation     Manuel Deras, PT

## 2022-06-21 ENCOUNTER — TELEPHONE (OUTPATIENT)
Dept: SPORTS MEDICINE | Facility: CLINIC | Age: 52
End: 2022-06-21

## 2022-06-21 ENCOUNTER — OFFICE VISIT (OUTPATIENT)
Dept: SPORTS MEDICINE | Facility: CLINIC | Age: 52
End: 2022-06-21
Payer: COMMERCIAL

## 2022-06-21 ENCOUNTER — CLINICAL SUPPORT (OUTPATIENT)
Dept: REHABILITATION | Facility: HOSPITAL | Age: 52
End: 2022-06-21
Payer: COMMERCIAL

## 2022-06-21 ENCOUNTER — HOSPITAL ENCOUNTER (OUTPATIENT)
Dept: RADIOLOGY | Facility: HOSPITAL | Age: 52
Discharge: HOME | End: 2022-06-21
Attending: FAMILY MEDICINE
Payer: COMMERCIAL

## 2022-06-21 VITALS — BODY MASS INDEX: 29.82 KG/M2 | WEIGHT: 179 LBS | TEMPERATURE: 98 F | HEIGHT: 65 IN

## 2022-06-21 DIAGNOSIS — R29.3 POSTURE ABNORMALITY: Primary | ICD-10-CM

## 2022-06-21 DIAGNOSIS — M47.816 LUMBAR SPONDYLOSIS: ICD-10-CM

## 2022-06-21 DIAGNOSIS — M70.72 ISCHIAL BURSITIS OF LEFT SIDE: ICD-10-CM

## 2022-06-21 DIAGNOSIS — M51.36 BULGE OF LUMBAR DISC WITHOUT MYELOPATHY: ICD-10-CM

## 2022-06-21 DIAGNOSIS — M16.12 PRIMARY OSTEOARTHRITIS OF LEFT HIP: ICD-10-CM

## 2022-06-21 DIAGNOSIS — R20.0 LOWER EXTREMITY NUMBNESS: Primary | ICD-10-CM

## 2022-06-21 DIAGNOSIS — R53.1 DECREASED STRENGTH: ICD-10-CM

## 2022-06-21 DIAGNOSIS — M25.652 DECREASED RANGE OF LEFT HIP MOVEMENT: ICD-10-CM

## 2022-06-21 DIAGNOSIS — R20.0 LOWER EXTREMITY NUMBNESS: ICD-10-CM

## 2022-06-21 PROCEDURE — 1159F PR MEDICATION LIST DOCUMENTED IN MEDICAL RECORD: ICD-10-PCS | Mod: CPTII,S$GLB,, | Performed by: FAMILY MEDICINE

## 2022-06-21 PROCEDURE — 99999 PR PBB SHADOW E&M-EST. PATIENT-LVL III: CPT | Mod: PBBFAC,,, | Performed by: FAMILY MEDICINE

## 2022-06-21 PROCEDURE — 99999 PR PBB SHADOW E&M-EST. PATIENT-LVL III: ICD-10-PCS | Mod: PBBFAC,,, | Performed by: FAMILY MEDICINE

## 2022-06-21 PROCEDURE — 4010F PR ACE/ARB THEARPY RXD/TAKEN: ICD-10-PCS | Mod: CPTII,S$GLB,, | Performed by: FAMILY MEDICINE

## 2022-06-21 PROCEDURE — 3008F PR BODY MASS INDEX (BMI) DOCUMENTED: ICD-10-PCS | Mod: CPTII,S$GLB,, | Performed by: FAMILY MEDICINE

## 2022-06-21 PROCEDURE — 3008F BODY MASS INDEX DOCD: CPT | Mod: CPTII,S$GLB,, | Performed by: FAMILY MEDICINE

## 2022-06-21 PROCEDURE — 97140 MANUAL THERAPY 1/> REGIONS: CPT

## 2022-06-21 PROCEDURE — 99214 PR OFFICE/OUTPT VISIT, EST, LEVL IV, 30-39 MIN: ICD-10-PCS | Mod: S$GLB,,, | Performed by: FAMILY MEDICINE

## 2022-06-21 PROCEDURE — 4010F ACE/ARB THERAPY RXD/TAKEN: CPT | Mod: CPTII,S$GLB,, | Performed by: FAMILY MEDICINE

## 2022-06-21 PROCEDURE — 3044F HG A1C LEVEL LT 7.0%: CPT | Mod: CPTII,S$GLB,, | Performed by: FAMILY MEDICINE

## 2022-06-21 PROCEDURE — 1160F RVW MEDS BY RX/DR IN RCRD: CPT | Mod: CPTII,S$GLB,, | Performed by: FAMILY MEDICINE

## 2022-06-21 PROCEDURE — 99214 OFFICE O/P EST MOD 30 MIN: CPT | Mod: S$GLB,,, | Performed by: FAMILY MEDICINE

## 2022-06-21 PROCEDURE — 1160F PR REVIEW ALL MEDS BY PRESCRIBER/CLIN PHARMACIST DOCUMENTED: ICD-10-PCS | Mod: CPTII,S$GLB,, | Performed by: FAMILY MEDICINE

## 2022-06-21 PROCEDURE — 3044F PR MOST RECENT HEMOGLOBIN A1C LEVEL <7.0%: ICD-10-PCS | Mod: CPTII,S$GLB,, | Performed by: FAMILY MEDICINE

## 2022-06-21 PROCEDURE — 1159F MED LIST DOCD IN RCRD: CPT | Mod: CPTII,S$GLB,, | Performed by: FAMILY MEDICINE

## 2022-06-21 PROCEDURE — 72100 XR LUMBAR SPINE AP AND LATERAL: ICD-10-PCS | Mod: 26,,, | Performed by: RADIOLOGY

## 2022-06-21 PROCEDURE — 97110 THERAPEUTIC EXERCISES: CPT

## 2022-06-21 PROCEDURE — 72100 X-RAY EXAM L-S SPINE 2/3 VWS: CPT | Mod: 26,,, | Performed by: RADIOLOGY

## 2022-06-21 PROCEDURE — 72100 X-RAY EXAM L-S SPINE 2/3 VWS: CPT | Mod: TC

## 2022-06-21 NOTE — TELEPHONE ENCOUNTER
----- Message from Mariela Shields sent at 6/21/2022 12:38 PM CDT -----  Type: Patient Call Back    Who called:pt    What is the request in detail:pt requesting to have orders for mri sent to [facility] in [facility]. Call pt to discuss.     Can the clinic reply by [facility]?    Would the patient rather a call back or a response via [facility]? call    Best call back number:[phone] (home) [phone] (work)      Additional Information:

## 2022-06-21 NOTE — PROGRESS NOTES
Ruth Harper, a 51 y.o. female, is here for evaluation of left hip.     HISTORY OF PRESENT ILLNESS   Location: proximal thigh   Onset: chronic, insidious  Palliative:    relative rest   oral analgesics, OTC    Provocative: prolonged ambulation  Prior: none  Progression: plateau discomfort   Quality: sharp  Radiation: none  Severity: per nursing documentation  Timing: intermittent with use  Trauma: none    Review of systems (ROS):  A 10+ review of systems was performed with pertinent positives and negatives noted above in the history of present illness. Other systems were negative unless otherwise specified.    PHYSICAL EXAMINATION  General:  The patient is alert and oriented x 3.  Mood is pleasant.  Observation of ears, eyes and nose reveal no gross abnormalities.  HEENT: NCAT, sclera nonicteric  Lungs: Respirations are equal and unlabored.   Gait is coordinated. Patient can toe walk and heel walk without difficulty.    HIP/PELVIS EXAMINATION    Observation/Inspection  Gait:   Nonantalgic   Alignment:  Neutral   Scars:   None   Muscle atrophy: None   Effusion:  None   Warmth:  None   Discoloration:   None   Leg lengths:   Equal   Pelvis:    Level     Tenderness/Crepitus (T/C):      T / C  Trochanteric bursa   - / -  Piriformis    - / -  SI joint    - / -  Psoas tendon   - / -  Rectus insertion  - / -  Adductor insertion  - / -  Pubic symphysis  - / -    ROM: (* = pain)    Flexion:      120 degrees  External rotation:   40 degrees  Internal rotation with axial load:  30 degrees  Internal rotation without axial load:  40 degrees  Abduction:    45 degrees  Adduction:     20 degrees    Special Tests:  Pain w/ forced internal rotation (FADIR):  -   Pain w/ forced external rotation (ELIAS):  -   Circumduction test:     -  Stinchfield test:     -   Log roll:       -   Snapping hip (internal):    -   Sit-up pain:      -   Resisted sit-up pain:     -   Resisted sit-up with adductor contraction pain:  -   Step-down  test:     +  Trendelenburg test:     -  Bridge test      +     Extremity Neuro-vascular Examination:   Sensation:  Grossly intact to light touch all dermatomal regions.   Motor Function:  Fully intact motor function at hip, knee, foot and ankle    DTRs;  quadriceps and  achilles 2+.  No clonus and downgoing Babinski.    Vascular status:  DP and PT pulses 2+, brisk capillary refill, symmetric.    Skin:  intact, compartments soft.    Other Findings:    ASSESSMENT & PLAN  Assessment  #1 Radicular symptoms lower extremity, left   -->MRI lumbar spine  #2 Tonnis Grade II osteoarthritis of hip, left   W/ tendinotic changes of proximal conjoined tendon    No evidence of cord pathology  No evidence of neurologic pathology  No evidence of vascular pathology    Imaging studies reviewed:  X-ray spine lumbar 22.06  X-ray pelvis and hip, bilat 22.05    Plan  Given extreme dysfunction and discomfort, coupled with history and physical examination suggesting lumbar disc pathology, and with non-diagnostic x-ray imaging, we will obtain MRI imaging for further evaluation of the soft tissue structures of the lumbar spine.    We discussed options including    Watchful waiting / relative rest    Physical therapy x   Injection therapy    Consultation    The patient chooses As above   x = prescribed  CSI = corticosteroid injection  VSI = viscosupplement injection  PRPI = platelet rich plasma injection  ia = intra articular  R = right  L = left  B = bilateral   nfSx = surgical consultation was recommended, but patient is not interested in consultation at this time    Physical Therapy        Formal (fPT), @ [facility] facility t   Formal (fPT), @ [facility] facility        Homegoing (hgPT), per concurrent fPT recommendations t   Homegoing (hgPT), per prior fPT recommendations    Homegoing (hgPT), handout provided        w/  (atPT)    [blank] = not prescribed  x = prescribed  b = prescribed, and begin as indicated  t = continue as  indicated  r = prescribed, and restart as indicated  p = completed prior as indicated  hs = prescribed, and with high school   col = prescribed, and with college or university   nfPT = physical therapy was recommended, but patient is not interested in PT at this time    Activity (e.g. sports, work) restrictions    [blank] = as tolerated  pt = per physical therapist  at = per   NWB = non weight bearing on affected lower extremity, with crutches assistance for ambulation    Bracing    [blank] = not prescribed  r = recommended, but not fit with at todays visit  f = prescribed and fit with at todays visit  t = continue as indicated  d = d/c  p = as needed  rare = use on rare, as-needed basis; advised against chronic use    Pain management    [blank] = No prescription necessary. A handout detailing dosing of appropriate   over-the-counter musculoskeletal analgesics was made available to the patient.   m = meloxicam x 14 days  mp = 14 day course of meloxicam prescribed prior    Follow up MRI   [blank] = as needed  [number] = in [number] weeks  CSI = for corticosteroid injection  VSI = for viscosupplement injection or injection series  PRP = for platelet rich plasma injection or injection series  MRI = after MRI imaging  ns = should surgical options be deferred (no surgery)  o = appointment offered, deferred by patient    Should symptoms worsen or fail to resolve, consider    Revisiting the above options and / or      Vocation:   teacher

## 2022-06-21 NOTE — TELEPHONE ENCOUNTER
Called and left message for patient to let her know the referral for her MRI was being faxed to [facility] and for her to contact the office with her appointment date.

## 2022-06-23 ENCOUNTER — CLINICAL SUPPORT (OUTPATIENT)
Dept: REHABILITATION | Facility: HOSPITAL | Age: 52
End: 2022-06-23
Payer: COMMERCIAL

## 2022-06-23 DIAGNOSIS — M51.36 BULGE OF LUMBAR DISC WITHOUT MYELOPATHY: ICD-10-CM

## 2022-06-23 DIAGNOSIS — M25.652 DECREASED RANGE OF LEFT HIP MOVEMENT: ICD-10-CM

## 2022-06-23 DIAGNOSIS — R53.1 DECREASED STRENGTH: ICD-10-CM

## 2022-06-23 DIAGNOSIS — R20.0 LOWER EXTREMITY NUMBNESS: Primary | ICD-10-CM

## 2022-06-23 DIAGNOSIS — M47.816 LUMBAR SPONDYLOSIS: ICD-10-CM

## 2022-06-23 DIAGNOSIS — M70.72 ISCHIAL BURSITIS OF LEFT SIDE: ICD-10-CM

## 2022-06-23 DIAGNOSIS — R29.3 POSTURE ABNORMALITY: Primary | ICD-10-CM

## 2022-06-23 PROCEDURE — 97140 MANUAL THERAPY 1/> REGIONS: CPT

## 2022-06-23 PROCEDURE — 97110 THERAPEUTIC EXERCISES: CPT

## 2022-06-23 NOTE — PROGRESS NOTES
Physical Therapy Treatment Note     Name: Ruth Harper  Clinic Number: [id number]    Therapy Diagnosis:   Encounter Diagnoses   Name Primary?    Posture abnormality Yes    Decreased strength     Decreased range of left hip movement     Ischial bursitis of left side      Physician: Tunde Venegas,*    Visit Date: 6/23/2022    Physician Orders: PT Eval and Treat   Medical Diagnosis from Referral: M70.72 (ICD-10-CM) - Ischial bursitis of left side  Evaluation Date: 6/6/2022  Authorization Period Expiration:12/31/2022  Plan of Care Expiration: 8/1/2022  Visit # / Visits authorized: 4/24     Time In: 900a  Time Out: 1000a  Total Appointment Time (timed & untimed codes): 60 minutes     Precautions: Standard     Initial FOTO: 33% (20% predicted)   FOTO 1st F/U:   FOTO 2nd F/U:   Subjective     Pt reports: she will be getting an MRI of her spine.       She was compliant with home exercise program.  Response to previous treatment: n/a  Functional change: ongoing    Pain: 2/10  Location: left buttocks      Objective     Assessment (6/23):    Lumbar segmental mobility: decreased mobility L1-L3   Slump: (-) full ext and DF    HS length: WNL B    Lumbar flexion: pain-free   Lumbar ext: painful but improved range and pain with TA contraction     Ruth received therapeutic exercises to develop strength, endurance and ROM for 45 minutes including:     Quadruped Thoraci Ext and Rot 2 x 10 B - NT  Tripod Hip Ext 2 x 10 B   Seated Nerve Glides in slump position 2 X 20   Standing hip abd leaning on mat 2 x 10 B   Half kneel HF stretch 3 x 20sec B with lumbar neutral   SL leg press 50# 3 x 10 B   Standing Paloff press 2 x 15 BTB B   Hip hinging from mat 2 x 15   Education - pain-free nerve sliders / lifting mechanics / lumbar roll      Ruth received the following manual therapy techniques: Joint mobilizations were applied to the: hip/thoracic for 15 minutes, including:     Pt agreeable to manipulation      Contract  relax 1jt and 2jt HF's 3 x 8   Ant [place] R L hip; grade 3   Long axis hip distraction manipulation   Prone thoracic manipulation to multiple levels    Not today:   Lumbar gapping mob; L2-L4   Lumbar flexion mob L1-L3    Ruth participated in neuromuscular re-education activities to improve: Coordination, Kinesthetic, Sense and Proprioception for 0 minutes. The following activities were included:    Quadruped UE 2 x 10 B         Home Exercises Provided and Patient Education Provided     Education provided:   - HEP   - mobility exercises 2x/day   - no tensioners.; pain-free nerve mobility     Written Home Exercises Provided: yes.  Exercises were reviewed and Ruth was able to demonstrate them prior to the end of the session.  Ruth demonstrated good  understanding of the education provided.     See EMR under Patient Instructions for exercises provided 6/9/2022.    Assessment   Ruth demonstrated improved lumbar ROM tolerance today. Even more so following core activation. Pt was educated on use of lumbar roll to manage increased pain with sitting. Pt educated that she needs at least 8-12 weeks of exercise to develop adequate core strengthen.     Ruth Is progressing well towards her goals.   Pt prognosis is Good.     Pt will continue to benefit from skilled outpatient physical therapy to address the deficits listed in the problem list box on initial evaluation, provide pt/family education and to maximize pt's level of independence in the home and community environment.     Pt's spiritual, cultural and educational needs considered and pt agreeable to plan of care and goals.     Anticipated barriers to physical therapy: none    Goals:  GOALS: Short Term Goals:  4 weeks  1.Report decreased knee pain  < / =  2/10  to increase tolerance for work and running activities  2. Pt will demonstrate a (-) Slump test to demonstrate decreased neural mechanosensivity.  3. Increase strength by 1/3 MMT grade in quad  strength  to increase tolerance for ADL and work activities.  4. 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   3.Increase strength to >/= 4+/5 in quad/hip  to increase tolerance for ADL and work activities.  4. Pt will report a 20% impaired on FOTO hip to demonstrate increase in LE function with every day tasks.      Plan   Plan of care Certification: 6/6/2022 to 8/6/2022.     Assess deep core activation     Manuel Deras, PT

## 2022-06-24 ENCOUNTER — PATIENT MESSAGE (OUTPATIENT)
Dept: SPORTS MEDICINE | Facility: CLINIC | Age: 52
End: 2022-06-24
Payer: COMMERCIAL

## 2022-06-28 ENCOUNTER — CLINICAL SUPPORT (OUTPATIENT)
Dept: REHABILITATION | Facility: HOSPITAL | Age: 52
End: 2022-06-28
Payer: COMMERCIAL

## 2022-06-28 DIAGNOSIS — R53.1 DECREASED STRENGTH: ICD-10-CM

## 2022-06-28 DIAGNOSIS — R29.3 POSTURE ABNORMALITY: Primary | ICD-10-CM

## 2022-06-28 DIAGNOSIS — M25.652 DECREASED RANGE OF LEFT HIP MOVEMENT: ICD-10-CM

## 2022-06-28 DIAGNOSIS — M70.72 ISCHIAL BURSITIS OF LEFT SIDE: ICD-10-CM

## 2022-06-28 PROCEDURE — 97140 MANUAL THERAPY 1/> REGIONS: CPT

## 2022-06-28 PROCEDURE — 97110 THERAPEUTIC EXERCISES: CPT

## 2022-06-28 NOTE — PROGRESS NOTES
Physical Therapy Treatment Note     Name: Ruth Harper  Clinic Number: [id number]    Therapy Diagnosis:   Encounter Diagnoses   Name Primary?    Posture abnormality Yes    Decreased strength     Decreased range of left hip movement     Ischial bursitis of left side      Physician: Tunde Venegas,*    Visit Date: 6/28/2022    Physician Orders: PT Eval and Treat   Medical Diagnosis from Referral: M70.72 (ICD-10-CM) - Ischial bursitis of left side  Evaluation Date: 6/6/2022  Authorization Period Expiration:12/31/2022  Plan of Care Expiration: 8/1/2022  Visit # / Visits authorized: 5/24     Time In: 1000a  Time Out: 1100a  Total Appointment Time (timed & untimed codes): 60 minutes     Precautions: Standard     Initial FOTO: 33% (20% predicted)   FOTO 1st F/U:   FOTO 2nd F/U:   Subjective     Pt reports: she will be getting an MRI of her spine.       She was compliant with home exercise program.  Response to previous treatment: n/a  Functional change: ongoing    Pain: 2/10  Location: left buttocks      Objective     Assessment (6/23):    Lumbar segmental mobility: decreased mobility L1-L3   Slump: (-) full ext and DF    HS length: WNL B    Lumbar flexion: painful    Lumbar ext: full and pain-free at EOT     Ruth received therapeutic exercises to develop strength, endurance and ROM for 45 minutes including:     Quadruped Thoraci Ext and Rot 2 x 10 B - NT  Tripod Hip Ext 2 x 10 B - NT    Seated Nerve Glides in slump position 2 X 20   Supine Heel slides with TA 2 x 10 B   Prone Press Ups 2 x 10 - pain-free; felt better.   Seated Thoracic Ext x30; 5sec holds   Standing hip ext leaning on mat 2 x 10 B   Half kneel HF stretch 3 x 20sec B with lumbar neutral     Not today:  SL leg press 50# 3 x 10 B   Standing Paloff press 2 x 15 BTB B   Hip hinging from mat 2 x 15   Education - pain-free nerve sliders / lifting mechanics / lumbar roll      Ruth received the following manual therapy techniques: Joint  mobilizations were applied to the: hip/thoracic for 15 minutes, including:     Pt agreeable to manipulation      Contract relax 1jt and 2jt HF's 3 x 8   Ant [place] R L hip; grade 3   Long axis hip distraction manipulation   Prone thoracic manipulation to multiple levels    Not today:   Lumbar gapping mob; L2-L4   Lumbar flexion mob L1-L3    Ruth participated in neuromuscular re-education activities to improve: Coordination, Kinesthetic, Sense and Proprioception for 0 minutes. The following activities were included:    Quadruped UE 2 x 10 B         Home Exercises Provided and Patient Education Provided     Education provided:   - HEP   - mobility exercises 2x/day   - no tensioners.; pain-free nerve mobility     Written Home Exercises Provided: yes.  Exercises were reviewed and Ruth was able to demonstrate them prior to the end of the session.  Ruth demonstrated good  understanding of the education provided.     See EMR under Patient Instructions for exercises provided 6/9/2022.    Assessment   Ruth continues to demonstrate poor lumbar/hip disassociation. Future tx to continue to work on hip ext. Pt educated to perform prone press ups every hour or standing lumbar ext to improve symptoms with flexion. Core stability remains a richards focus of tx.     Ruth Is progressing well towards her goals.   Pt prognosis is Good.     Pt will continue to benefit from skilled outpatient physical therapy to address the deficits listed in the problem list box on initial evaluation, provide pt/family education and to maximize pt's level of independence in the home and community environment.     Pt's spiritual, cultural and educational needs considered and pt agreeable to plan of care and goals.     Anticipated barriers to physical therapy: none    Goals:  GOALS: Short Term Goals:  4 weeks  1.Report decreased knee pain  < / =  2/10  to increase tolerance for work and running activities  2. Pt will demonstrate a (-) Slump  test to demonstrate decreased neural mechanosensivity.  3. Increase strength by 1/3 MMT grade in quad strength  to increase tolerance for ADL and work activities.  4. 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   3.Increase strength to >/= 4+/5 in quad/hip  to increase tolerance for ADL and work activities.  4. Pt will report a 20% impaired on FOTO hip to demonstrate increase in LE function with every day tasks.      Plan   Plan of care Certification: 6/6/2022 to 8/6/2022.     Assess deep core activation     Manuel Deras, PT

## 2022-06-29 ENCOUNTER — CLINICAL SUPPORT (OUTPATIENT)
Dept: REHABILITATION | Facility: HOSPITAL | Age: 52
End: 2022-06-29
Payer: COMMERCIAL

## 2022-06-29 DIAGNOSIS — R53.1 DECREASED STRENGTH: ICD-10-CM

## 2022-06-29 DIAGNOSIS — M25.652 DECREASED RANGE OF LEFT HIP MOVEMENT: Primary | ICD-10-CM

## 2022-06-29 DIAGNOSIS — R29.3 POSTURE ABNORMALITY: ICD-10-CM

## 2022-06-29 PROCEDURE — 97140 MANUAL THERAPY 1/> REGIONS: CPT

## 2022-06-29 PROCEDURE — 97112 NEUROMUSCULAR REEDUCATION: CPT

## 2022-06-29 PROCEDURE — 97110 THERAPEUTIC EXERCISES: CPT

## 2022-06-29 NOTE — PROGRESS NOTES
"  Physical Therapy Treatment Note     Name: Ruth Harper  Clinic Number: [id number]    Therapy Diagnosis:   Encounter Diagnoses   Name Primary?    Decreased range of left hip movement Yes    Decreased strength     Posture abnormality      Physician: Tunde Venegas,*    Visit Date: 6/29/2022    Physician Orders: PT Eval and Treat   Medical Diagnosis from Referral: M70.72 (ICD-10-CM) - Ischial bursitis of left side  Evaluation Date: 6/6/2022  Authorization Period Expiration:12/31/2022  Plan of Care Expiration: 8/1/2022  Visit # / Visits authorized: 6/24     Time In: 900 a  Time Out: 1000 a  Total Appointment Time (timed & untimed codes): 60 minutes     Precautions: Standard     Initial FOTO: 33% (20% predicted)   FOTO 1st F/U: 33% (6/29/22)   FOTO 2nd F/U:   Subjective     Pt reports: she got an MRI this morning.       She was compliant with home exercise program.  Response to previous treatment: n/a  Functional change: ongoing    Pain: 2/10  Location: left buttocks      Objective     Assessment (6/23):    Lumbar segmental mobility: decreased mobility L1-L3   Slump: (-) full ext and DF    HS length: WNL B    Lumbar flexion: painful    Lumbar ext: full and pain-free at EOT     Assessment (6/29):   Slump: (+) with full ext and DF    Lumbar flexion: not painful    Lumbar extension: pain-free; "feels better than last time"   James Test: L more stiff than R       Ruth received therapeutic exercises to develop strength, endurance and ROM for 40 minutes including:    Seated Nerve Glides in slump position 2 X 20   Supine Heel slides with TA 2 x 10 B   Prone Press Ups 2 x 10 - pain-free; felt better   Seated Thoracic Ext Block at T6  x30; 5sec holds   Standing hip ext leaning on mat 2 x 10 B   Hip hinging from mat 2 x 15      NT:  SL leg press 50# 3 x 10 B   Quadruped Thoraci Ext and Rot 2 x 10 B   Tripod Hip Ext 2 x 10 B   Half kneel HF stretch 3 x 20sec B with lumbar neutral   Education - pain-free nerve " sliders / lifting mechanics / lumbar roll      Ruth received the following manual therapy techniques: Joint mobilizations were applied to the: hip/thoracic for 10 minutes, including:     Contract relax 1jt and 2jt HF's 3 x 8   Ant [place] R L hip; grade 3   Prone thoracic manipulation to multiple levels    Not today:   Lumbar gapping mob; L2-L4   Long axis hip distraction manipulation   Lumbar flexion mob L1-L3    Ruth participated in neuromuscular re-education activities to improve: Coordination, Kinesthetic, Sense and Proprioception for 10 minutes. The following activities were included:    Quadruped UE 2 x 10 B - NT   Paloff press BTB w PPT 2 x 10 B   TA activation/PPT w marching 2 x 15     Home Exercises Provided and Patient Education Provided     Education provided:   - HEP   - mobility exercises 2x/day   - no tensioners.; pain-free nerve mobility     Written Home Exercises Provided: yes.  Exercises were reviewed and Ruth was able to demonstrate them prior to the end of the session.  Ruth demonstrated good  understanding of the education provided.     See EMR under Patient Instructions for exercises provided 6/9/2022.    Assessment   Ruth tolerated treatment session well today with minimal complaints but continues to demonstrate poor lumbar/hip disassociation. She did well with introducing a posterior pelvic tilt in standing exercises to achieve lumbar neutral. Min verbal cueing needed as reminder to keep pelvic tilt. Pt progressed to tolerating positive slump test at full tensioner position. Future tx to continue to work on hip ext. Core stability remains a key focus of tx. Pt educated to add PPT to at home supine core exercises also.     Ruth Is progressing well towards her goals.   Pt prognosis is Good.     Pt will continue to benefit from skilled outpatient physical therapy to address the deficits listed in the problem list box on initial evaluation, provide pt/family education and to  maximize pt's level of independence in the home and community environment.     Pt's spiritual, cultural and educational needs considered and pt agreeable to plan of care and goals.     Anticipated barriers to physical therapy: none    Goals:  GOALS: Short Term Goals:  4 weeks  1.Report decreased knee pain  < / =  2/10  to increase tolerance for work and running activities  2. Pt will demonstrate a (-) Slump test to demonstrate decreased neural mechanosensivity.  3. Increase strength by 1/3 MMT grade in quad strength  to increase tolerance for ADL and work activities.  4. 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   3.Increase strength to >/= 4+/5 in quad/hip  to increase tolerance for ADL and work activities.  4. Pt will report a 20% impaired on FOTO hip to demonstrate increase in LE function with every day tasks.      Plan   Plan of care Certification: 6/6/2022 to 8/6/2022.     Assess deep core activation     Davis Kamryn, ADILIA     I attest that I was present for the entirety of the treatment and agree to the assessment mentioned above.     Manuel Deras, PT

## 2022-06-30 ENCOUNTER — TELEPHONE (OUTPATIENT)
Dept: SPORTS MEDICINE | Facility: CLINIC | Age: 52
End: 2022-06-30
Payer: COMMERCIAL

## 2022-06-30 NOTE — TELEPHONE ENCOUNTER
Spoke with patient to schedule an MRI review    [facility]  Clinical Assistant to Dr. Fidel Malone

## 2022-06-30 NOTE — TELEPHONE ENCOUNTER
----- Message from Veronica Richardson sent at 6/30/2022  2:19 PM CDT -----  Regarding: Return Call  Returning a call. [phone]

## 2022-07-06 ENCOUNTER — CLINICAL SUPPORT (OUTPATIENT)
Dept: REHABILITATION | Facility: HOSPITAL | Age: 52
End: 2022-07-06
Payer: COMMERCIAL

## 2022-07-06 DIAGNOSIS — M25.652 DECREASED RANGE OF LEFT HIP MOVEMENT: ICD-10-CM

## 2022-07-06 DIAGNOSIS — R53.1 DECREASED STRENGTH: ICD-10-CM

## 2022-07-06 DIAGNOSIS — R29.3 POSTURE ABNORMALITY: Primary | ICD-10-CM

## 2022-07-06 PROCEDURE — 97140 MANUAL THERAPY 1/> REGIONS: CPT

## 2022-07-06 PROCEDURE — 97112 NEUROMUSCULAR REEDUCATION: CPT

## 2022-07-06 PROCEDURE — 97110 THERAPEUTIC EXERCISES: CPT

## 2022-07-06 NOTE — PROGRESS NOTES
"  Physical Therapy Treatment Note     Name: Ruth Harper  Clinic Number: [id number]    Therapy Diagnosis:   Encounter Diagnoses   Name Primary?    Decreased range of left hip movement     Posture abnormality Yes    Decreased strength      Physician: Tunde Venegas,*    Visit Date: 7/6/2022    Physician Orders: PT Eval and Treat   Medical Diagnosis from Referral: M70.72 (ICD-10-CM) - Ischial bursitis of left side  Evaluation Date: 6/6/2022  Authorization Period Expiration:12/31/2022  Plan of Care Expiration: 8/1/2022  Visit # / Visits authorized: 7/24     Time In: 1000 a  Time Out: 1100 a  Total Appointment Time (timed & untimed codes): 60 minutes     Precautions: Standard     Initial FOTO: 33% (20% predicted)   FOTO 1st F/U: 33% (6/29/22)   FOTO 2nd F/U:   Subjective     Pt reports: she is feeling a little better but still has flare ups with certain movements. Had the MRI but has not heard back yet about results. Feeling nervous about surgery tomorrow.       She was compliant with home exercise program.  Response to previous treatment: n/a  Functional change: ongoing    Pain: 2/10  Location: left buttocks      Objective     Assessment (6/23):    Lumbar segmental mobility: decreased mobility L1-L3   Slump: (-) full ext and DF    HS length: WNL B    Lumbar flexion: painful    Lumbar ext: full and pain-free      Assessment (6/29):   Slump: (+) with full ext and DF    Lumbar flexion: not painful    Lumbar extension: pain-free; "feels better than last time"   James Test: L more stiff than R     Assessment (7/6):    Slump: (+) LLE    Lumbar Flexion: not painful    Lumbar Ext isolated/SB/Rotation: radiating pain down the leg.       Ruth received therapeutic exercises to develop strength, endurance and ROM for 40 minutes including:    Seated Nerve Glides in slump position 2 X 20   Seated Thoracic Ext Block at T6  x30; 5sec holds   SL leg press 50# 3 x 10 B   Hip hinging into mini squat using dowel 2 x 15    "   NT:  Quadruped Thoraci Ext and Rot 2 x 10 B   Standing hip ext leaning on mat 2 x 10 B   Supine Heel slides with TA 2 x 10 B   Prone Press Ups 2 x 10 - pain-free; felt better   Tripod Hip Ext 2 x 10 B   Half kneel HF stretch 3 x 20sec B with lumbar neutral   Education - pain-free nerve sliders / lifting mechanics / lumbar roll      Ruth received the following manual therapy techniques: Joint mobilizations were applied to the: hip/thoracic for 10 minutes, including:     Contract relax 1jt and 2jt HF's 3 x 8   Ant [place] R L hip; grade 3   Prone thoracic manipulation to multiple levels - pt agreeable   Lumbar gapping mob; L2-L4     Not today:   Long axis hip distraction manipulation   Lumbar flexion mob L1-L3    Ruth participated in neuromuscular re-education activities to improve: Coordination, Kinesthetic, Sense and Proprioception for 10 minutes. The following activities were included:    Quadruped UE 2 x 10 B - NT   Paloff press GTB w PPT 2 x 10 B   PPT with mini squat 2 x 10   Lateral stepping GTB at ankles 3 laps - cueing for PPT and keeping toes forward   TA activation/PPT w marching 2 x 15 - NT    Home Exercises Provided and Patient Education Provided     Education provided:   - HEP   - mobility exercises 2x/day   - no tensioners.; pain-free nerve mobility     Written Home Exercises Provided: yes.  Exercises were reviewed and Ruth was able to demonstrate them prior to the end of the session.  Ruth demonstrated good  understanding of the education provided.     See EMR under Patient Instructions for exercises provided 6/9/2022.    Assessment   Ruth continues to presents with inconsistent symptoms from treatment to treatment. She is displaying increased ability to work through exercises with core control using her PPT in standing and lateral stepping exercises with no complaints of increased symptoms. Pt educated on importance of continuing to be mindful of activity modification and to  continue her HEP exercises. Pt educated heavily on pain-science education and the relationship between external stress effecting the body's pain response.        Ruth Is progressing well towards her goals.   Pt prognosis is Good.     Pt will continue to benefit from skilled outpatient physical therapy to address the deficits listed in the problem list box on initial evaluation, provide pt/family education and to maximize pt's level of independence in the home and community environment.     Pt's spiritual, cultural and educational needs considered and pt agreeable to plan of care and goals.     Anticipated barriers to physical therapy: none    Goals:  GOALS: Short Term Goals:  4 weeks  1.Report decreased knee pain  < / =  2/10  to increase tolerance for work and running activities  2. Pt will demonstrate a (-) Slump test to demonstrate decreased neural mechanosensivity.  3. Increase strength by 1/3 MMT grade in quad strength  to increase tolerance for ADL and work activities.  4. 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   3.Increase strength to >/= 4+/5 in quad/hip  to increase tolerance for ADL and work activities.  4. Pt will report a 20% impaired on FOTO hip to demonstrate increase in LE function with every day tasks.      Plan   Plan of care Certification: 6/6/2022 to 8/6/2022.     Assess deep core activation     Davis Harry, ADILIA     I attest that I was present for the entirety of the treatment and agree to the assessment mentioned above.     Manuel Deras, PT

## 2022-07-12 ENCOUNTER — CLINICAL SUPPORT (OUTPATIENT)
Dept: REHABILITATION | Facility: HOSPITAL | Age: 52
End: 2022-07-12
Payer: COMMERCIAL

## 2022-07-12 DIAGNOSIS — R29.3 POSTURE ABNORMALITY: ICD-10-CM

## 2022-07-12 DIAGNOSIS — R53.1 DECREASED STRENGTH: ICD-10-CM

## 2022-07-12 DIAGNOSIS — M25.652 DECREASED RANGE OF LEFT HIP MOVEMENT: Primary | ICD-10-CM

## 2022-07-12 PROCEDURE — 97112 NEUROMUSCULAR REEDUCATION: CPT

## 2022-07-12 PROCEDURE — 97140 MANUAL THERAPY 1/> REGIONS: CPT

## 2022-07-12 PROCEDURE — 97110 THERAPEUTIC EXERCISES: CPT

## 2022-07-12 NOTE — PROGRESS NOTES
"  Physical Therapy Treatment Note     Name: Ruth Harper  Clinic Number: [id number]    Therapy Diagnosis:   Encounter Diagnoses   Name Primary?    Decreased range of left hip movement Yes    Posture abnormality     Decreased strength      Physician: Tunde Venegas,*    Visit Date: 7/12/2022    Physician Orders: PT Eval and Treat   Medical Diagnosis from Referral: M70.72 (ICD-10-CM) - Ischial bursitis of left side  Evaluation Date: 6/6/2022  Authorization Period Expiration:12/31/2022  Plan of Care Expiration: 8/1/2022  Visit # / Visits authorized: 8/24     Time In: 100p  Time Out: 200p  Total Appointment Time (timed & untimed codes):  60 minutes     Precautions: Standard     Initial FOTO: 33% (20% predicted)   FOTO 1st F/U: 33% (6/29/22)   FOTO 2nd F/U:   Subjective     Pt reports: she feels good but still has pain getting up first thing in the morning. Pt states from her mouth  surgery she is not supposed to do any heavy lifting or bending over.       She was compliant with home exercise program.  Response to previous treatment: n/a  Functional change: ongoing    Pain: 2/10  Location: left buttocks      Objective     Assessment (6/23):    Lumbar segmental mobility: decreased mobility L1-L3   Slump: (-) full ext and DF    HS length: WNL B    Lumbar flexion: painful    Lumbar ext: full and pain-free      Assessment (6/29):   Slump: (+) with full ext and DF    Lumbar flexion: not painful    Lumbar extension: pain-free; "feels better than last time"   James Test: L more stiff than R     Assessment (7/6):    Slump: (+) LLE    Lumbar Flexion: not painful    Lumbar Ext isolated/SB/Rotation: radiating pain down the leg.       Ruth received therapeutic exercises to develop strength, endurance and ROM for 10 minutes including:    SL Open Books x 20 with exhale at end-range   Supine Thoracic Ext with half foam along back x 20 with exhale at end range of BUE flexion   Education - HEP      NT:  Quadruped Thoraci " Ext and Rot 2 x 10 B   Standing hip ext leaning on mat 2 x 10 B   Supine Heel slides with TA 2 x 10 B   Prone Press Ups 2 x 10 - pain-free; felt better   Tripod Hip Ext 2 x 10 B   Half kneel HF stretch 3 x 20sec B with lumbar neutral   Education - pain-free nerve sliders / lifting mechanics / lumbar roll      Ruth received the following manual therapy techniques: Joint mobilizations were applied to the: hip/thoracic for 10 minutes, including:     Contract relax 1jt and 2jt HF's 3 x 8   Ant [place] R L hip; grade 3 - NT  Prone thoracic manipulation to multiple levels - pt agreeable   Lumbar gapping mob; L2-L4     Not today:   Long axis hip distraction manipulation   Lumbar flexion mob L1-L3    Ruth participated in neuromuscular re-education activities to improve: Coordination, Kinesthetic, Sense and Proprioception for 40 minutes. The following activities were included:    Paloff press MTB w PPT in lunge position 2 x 10 B   Quadruped LE Hip Ext 2 x 10 B   Quadruped Bird [place] 4 x 5 B   Side Steps with YTB with PPT 4 laps fo 15 ft   Sit<>stands on 15# KB 3 x 8     Not today:   Quadruped UE 2 x 10 B - NT   PPT with mini squat 2 x 10   Lateral stepping GTB at ankles 3 laps - cueing for PPT and keeping toes forward   TA activation/PPT w marching 2 x 15 - NT    Home Exercises Provided and Patient Education Provided     Education provided:   - HEP   - mobility exercises 2x/day   - no tensioners.; pain-free nerve mobility     Written Home Exercises Provided: yes.  Exercises were reviewed and Ruth was able to demonstrate them prior to the end of the session.  Ruth demonstrated good  understanding of the education provided.     See EMR under Patient Instructions for exercises provided 6/9/2022.    Assessment   Ruth continues to demonstrate a poor movement pattern of lumbar ext over hip ext. Deep core stability remains an area of interest as well to improve tolerance to lumbar loading.       Ruth Is  progressing well towards her goals.   Pt prognosis is Good.     Pt will continue to benefit from skilled outpatient physical therapy to address the deficits listed in the problem list box on initial evaluation, provide pt/family education and to maximize pt's level of independence in the home and community environment.     Pt's spiritual, cultural and educational needs considered and pt agreeable to plan of care and goals.     Anticipated barriers to physical therapy: none    Goals:  GOALS: Short Term Goals:  4 weeks  1.Report decreased knee pain  < / =  2/10  to increase tolerance for work and running activities  2. Pt will demonstrate a (-) Slump test to demonstrate decreased neural mechanosensivity.  3. Increase strength by 1/3 MMT grade in quad strength  to increase tolerance for ADL and work activities.  4. 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   3.Increase strength to >/= 4+/5 in quad/hip  to increase tolerance for ADL and work activities.  4. Pt will report a 20% impaired on FOTO hip to demonstrate increase in LE function with every day tasks.      Plan   Plan of care Certification: 6/6/2022 to 8/6/2022.       Manuel Deras, PT

## 2022-07-13 ENCOUNTER — OFFICE VISIT (OUTPATIENT)
Dept: SPORTS MEDICINE | Facility: CLINIC | Age: 52
End: 2022-07-13
Payer: COMMERCIAL

## 2022-07-13 VITALS
HEIGHT: 65 IN | DIASTOLIC BLOOD PRESSURE: 93 MMHG | SYSTOLIC BLOOD PRESSURE: 133 MMHG | WEIGHT: 175 LBS | TEMPERATURE: 98 F | BODY MASS INDEX: 29.16 KG/M2 | HEART RATE: 77 BPM

## 2022-07-13 DIAGNOSIS — M54.16 LUMBAR BACK PAIN WITH RADICULOPATHY AFFECTING LEFT LOWER EXTREMITY: ICD-10-CM

## 2022-07-13 DIAGNOSIS — M47.816 LUMBAR SPONDYLOSIS: Primary | ICD-10-CM

## 2022-07-13 DIAGNOSIS — M51.37 DEGENERATIVE DISC DISEASE AT L5-S1 LEVEL: ICD-10-CM

## 2022-07-13 PROCEDURE — 99999 PR PBB SHADOW E&M-EST. PATIENT-LVL III: CPT | Mod: PBBFAC,,, | Performed by: FAMILY MEDICINE

## 2022-07-13 PROCEDURE — 4010F ACE/ARB THERAPY RXD/TAKEN: CPT | Mod: CPTII,S$GLB,, | Performed by: FAMILY MEDICINE

## 2022-07-13 PROCEDURE — 99214 OFFICE O/P EST MOD 30 MIN: CPT | Mod: S$GLB,,, | Performed by: FAMILY MEDICINE

## 2022-07-13 PROCEDURE — 3008F PR BODY MASS INDEX (BMI) DOCUMENTED: ICD-10-PCS | Mod: CPTII,S$GLB,, | Performed by: FAMILY MEDICINE

## 2022-07-13 PROCEDURE — 1159F MED LIST DOCD IN RCRD: CPT | Mod: CPTII,S$GLB,, | Performed by: FAMILY MEDICINE

## 2022-07-13 PROCEDURE — 3044F PR MOST RECENT HEMOGLOBIN A1C LEVEL <7.0%: ICD-10-PCS | Mod: CPTII,S$GLB,, | Performed by: FAMILY MEDICINE

## 2022-07-13 PROCEDURE — 99999 PR PBB SHADOW E&M-EST. PATIENT-LVL III: ICD-10-PCS | Mod: PBBFAC,,, | Performed by: FAMILY MEDICINE

## 2022-07-13 PROCEDURE — 3008F BODY MASS INDEX DOCD: CPT | Mod: CPTII,S$GLB,, | Performed by: FAMILY MEDICINE

## 2022-07-13 PROCEDURE — 4010F PR ACE/ARB THEARPY RXD/TAKEN: ICD-10-PCS | Mod: CPTII,S$GLB,, | Performed by: FAMILY MEDICINE

## 2022-07-13 PROCEDURE — 3075F SYST BP GE 130 - 139MM HG: CPT | Mod: CPTII,S$GLB,, | Performed by: FAMILY MEDICINE

## 2022-07-13 PROCEDURE — 1159F PR MEDICATION LIST DOCUMENTED IN MEDICAL RECORD: ICD-10-PCS | Mod: CPTII,S$GLB,, | Performed by: FAMILY MEDICINE

## 2022-07-13 PROCEDURE — 3044F HG A1C LEVEL LT 7.0%: CPT | Mod: CPTII,S$GLB,, | Performed by: FAMILY MEDICINE

## 2022-07-13 PROCEDURE — 3080F PR MOST RECENT DIASTOLIC BLOOD PRESSURE >= 90 MM HG: ICD-10-PCS | Mod: CPTII,S$GLB,, | Performed by: FAMILY MEDICINE

## 2022-07-13 PROCEDURE — 1160F PR REVIEW ALL MEDS BY PRESCRIBER/CLIN PHARMACIST DOCUMENTED: ICD-10-PCS | Mod: CPTII,S$GLB,, | Performed by: FAMILY MEDICINE

## 2022-07-13 PROCEDURE — 99214 PR OFFICE/OUTPT VISIT, EST, LEVL IV, 30-39 MIN: ICD-10-PCS | Mod: S$GLB,,, | Performed by: FAMILY MEDICINE

## 2022-07-13 PROCEDURE — 3075F PR MOST RECENT SYSTOLIC BLOOD PRESS GE 130-139MM HG: ICD-10-PCS | Mod: CPTII,S$GLB,, | Performed by: FAMILY MEDICINE

## 2022-07-13 PROCEDURE — 3080F DIAST BP >= 90 MM HG: CPT | Mod: CPTII,S$GLB,, | Performed by: FAMILY MEDICINE

## 2022-07-13 PROCEDURE — 1160F RVW MEDS BY RX/DR IN RCRD: CPT | Mod: CPTII,S$GLB,, | Performed by: FAMILY MEDICINE

## 2022-07-13 NOTE — PROGRESS NOTES
Ruth Harper, a 52 y.o. female, is here for evaluation of left hip.     HISTORY OF PRESENT ILLNESS   Location: proximal thigh   Onset: chronic, insidious  Palliative:    relative rest   oral analgesics, OTC    Provocative: prolonged ambulation  Prior: none  Progression: plateau discomfort   Quality: sharp  Radiation: none  Severity: per nursing documentation  Timing: intermittent with use  Trauma: none    Review of systems (ROS):  A 10+ review of systems was performed with pertinent positives and negatives noted above in the history of present illness. Other systems were negative unless otherwise specified.    PHYSICAL EXAMINATION  General:  The patient is alert and oriented x 3.  Mood is pleasant.  Observation of ears, eyes and nose reveal no gross abnormalities.  HEENT: NCAT, sclera nonicteric  Lungs: Respirations are equal and unlabored.   Gait is coordinated. Patient can toe walk and heel walk without difficulty.    HIP/PELVIS EXAMINATION    Observation/Inspection  Gait:   Nonantalgic   Alignment:  Neutral   Scars:   None   Muscle atrophy: None   Effusion:  None   Warmth:  None   Discoloration:   None   Leg lengths:   Equal   Pelvis:    Level     Tenderness/Crepitus (T/C):      T / C  Trochanteric bursa   - / -  Piriformis    - / -  SI joint    - / -  Psoas tendon   - / -  Rectus insertion  - / -  Adductor insertion  - / -  Pubic symphysis  - / -    ROM: (* = pain)    Flexion:      120 degrees  External rotation:   40 degrees  Internal rotation with axial load:  30 degrees  Internal rotation without axial load:  40 degrees  Abduction:    45 degrees  Adduction:     20 degrees    Special Tests:  Pain w/ forced internal rotation (FADIR):  -   Pain w/ forced external rotation (ELIAS):  -   Circumduction test:     -  Stinchfield test:     -   Log roll:       -   Snapping hip (internal):    -   Sit-up pain:      -   Resisted sit-up pain:     -   Resisted sit-up with adductor contraction pain:  -   Step-down  test:     +  Trendelenburg test:     -  Bridge test      +     Extremity Neuro-vascular Examination:   Sensation:  Grossly intact to light touch all dermatomal regions.   Motor Function:  Fully intact motor function at hip, knee, foot and ankle    DTRs;  quadriceps and  achilles 2+.  No clonus and downgoing Babinski.    Vascular status:  DP and PT pulses 2+, brisk capillary refill, symmetric.    Skin:  intact, compartments soft.    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   Feli L4/L5 and L5/S1, with bulging discs, left   W/ subsequent radiculopathy  #2 Ton[place] Grade II osteoarthritis of hip, left   W/ tendinotic changes of proximal conjoined tendon    No evidence of cord pathology / myelopathy  No evidence of vascular pathology    Imaging studies reviewed:  X-ray spine lumbar 22.06  MRI spine lumbar 22.06 OSH  X-ray pelvis and hip, bilat 22.05    Plan    We discussed options including    Watchful waiting / relative rest    Physical therapy x   Injection therapy discussed; deferred   Consultation discussed; deferred by pt   The patient chooses As above   x = prescribed  CSI = corticosteroid injection  VSI = viscosupplement injection  PRPI = platelet rich plasma injection  ia = intra articular  R = right  L = left  B = bilateral   nfSx = surgical consultation was recommended, but patient is not interested in consultation at this time    Physical Therapy        Formal (fPT), @ [facility] facility t   Formal (fPT), @ OSH facility        Homegoing (hgPT), per concurrent fPT recommendations t   Homegoing (hgPT), per prior fPT recommendations    Homegoing (hgPT), handout provided        w/  (atPT)    [blank] = not prescribed  x = prescribed  b = prescribed, and begin as indicated  t = continue as indicated  r = prescribed, and restart as indicated  p = completed prior as indicated  hs = prescribed, and with high school athletic    col = prescribed, and with [facility] or [place]   nfPT = physical therapy was recommended, but patient is not interested in PT at this time    Activity (e.g. sports, work) restrictions    [blank] = as tolerated  pt = per physical therapist  at = per   NWB = non weight bearing on affected lower extremity, with crutches assistance for ambulation    Bracing    [blank] = not prescribed  r = recommended, but not fit with at todays visit  f = prescribed and fit with at todays visit  t = continue as indicated  d = d/c  p = as needed  rare = use on rare, as-needed basis; advised against chronic use    Pain management    [blank] = No prescription necessary. A handout detailing dosing of appropriate   over-the-counter musculoskeletal analgesics was made available to the patient.   m = meloxicam x 14 days  mp = 14 day course of meloxicam prescribed prior    Follow up o   [blank] = as needed  [number] = in [number] weeks  CSI = for corticosteroid injection  VSI = for viscosupplement injection or injection series  PRP = for platelet rich plasma injection or injection series  MRI = after MRI imaging  ns = should surgical options be deferred (no surgery)  o = appointment offered, deferred by patient    Should symptoms worsen or fail to resolve, consider    Revisiting the above options and / or Consultation re disc resection     Vocation:   teacher

## 2022-07-15 ENCOUNTER — CLINICAL SUPPORT (OUTPATIENT)
Dept: REHABILITATION | Facility: HOSPITAL | Age: 52
End: 2022-07-15
Payer: COMMERCIAL

## 2022-07-15 DIAGNOSIS — R29.3 POSTURE ABNORMALITY: Primary | ICD-10-CM

## 2022-07-15 DIAGNOSIS — R53.1 DECREASED STRENGTH: ICD-10-CM

## 2022-07-15 DIAGNOSIS — M25.652 DECREASED RANGE OF LEFT HIP MOVEMENT: ICD-10-CM

## 2022-07-15 PROCEDURE — 97112 NEUROMUSCULAR REEDUCATION: CPT

## 2022-07-15 PROCEDURE — 97110 THERAPEUTIC EXERCISES: CPT

## 2022-07-15 PROCEDURE — 97140 MANUAL THERAPY 1/> REGIONS: CPT

## 2022-07-15 NOTE — PROGRESS NOTES
"  Physical Therapy Treatment Note     Name: Ruth Harper  Clinic Number: [id number]    Therapy Diagnosis:   Encounter Diagnoses   Name Primary?    Posture abnormality Yes    Decreased range of left hip movement     Decreased strength      Physician: Tunde Venegas,*    Visit Date: 7/15/2022    Physician Orders: PT Eval and Treat   Medical Diagnosis from Referral: M70.72 (ICD-10-CM) - Ischial bursitis of left side  Evaluation Date: 6/6/2022  Authorization Period Expiration:12/31/2022  Plan of Care Expiration: 8/1/2022  Visit # / Visits authorized: 8/24     Time In: 1100 a  Time Out: 1200p  Total Appointment Time (timed & untimed codes):  60 minutes     Precautions: Standard     Initial FOTO: 33% (20% predicted)   FOTO 1st F/U: 33% (6/29/22)   FOTO 2nd F/U:   Subjective     Pt reports: she feels good today and has not had any pain or stiffness today.       She was compliant with home exercise program.  Response to previous treatment: n/a  Functional change: ongoing    Pain: 1/10  Location: left buttocks      Objective     Assessment (6/23):    Lumbar segmental mobility: decreased mobility L1-L3   Slump: (-) full ext and DF    HS length: WNL B    Lumbar flexion: painful    Lumbar ext: full and pain-free      Assessment (6/29):   Slump: (+) with full ext and DF    Lumbar flexion: not painful    Lumbar extension: pain-free; "feels better than last time"   James Test: L more stiff than R     Assessment (7/6):    Slump: (+) LLE    Lumbar Flexion: not painful    Lumbar Ext isolated/SB/Rotation: radiating pain down the leg.     Assessment (7/14):   Slump: (+) LLE with full tensioner    Lumbar ROM: painful with extension, pain-free with flexion       Ruth received therapeutic exercises to develop strength, endurance and ROM for 20 minutes including:    DL shuttle 75# 3 x 12 YTB around knees   Prone Press Ups 2 x 10 - pain-free; felt better   Education - HEP  Revised Neurophysiology of Pain Questionnaire    "   NT:  Quadruped Thoracic Ext and Rot 2 x 10 B   Supine Thoracic Ext with half foam along back x 20 with exhale at end range of BUE flexio  Standing hip ext leaning on mat 2 x 10 B   SL Open Books x 20 with exhale at end-range   Supine Heel slides with TA 2 x 10 B   Half kneel HF stretch 3 x 20sec B with lumbar neutral   Education - pain-free nerve sliders / lifting mechanics / lumbar roll      Ruth received the following manual therapy techniques: Joint mobilizations were applied to the: hip/thoracic for 10 minutes, including:     Prone thoracic manipulation to multiple levels - pt agreeable   Prone thoracic mobs, grade 4  Prone CT junction gapping mobs, grade 3/4       Not today:   Long axis hip distraction manipulation   Lumbar flexion mob L1-L3  Lumbar gapping mob; L2-L4   Contract relax 1jt and 2jt HF's 3 x 8   Ant [place] R L hip; grade 3     Ruth participated in neuromuscular re-education activities to improve: Coordination, Kinesthetic, Sense and Proprioception for 30 minutes. The following activities were included:    Paloff press BTB w PPT in mini squat position 2 x 15 B   TripodLE Hip Ext 2 x 10 B   Bridges YTB w TA contraction/PPT 3 x 10   Lateral stepping GTB at ankles 3 laps - cueing for PPT and keeping toes forward       Not today:   Quadruped UE 2 x 10 B - NT   PPT with mini squat 2 x 10   TA activation/PPT w marching 2 x 15 - NT    Home Exercises Provided and Patient Education Provided     Education provided:   - HEP   - mobility exercises 2x/day   - no tensioners.; pain-free nerve mobility     Written Home Exercises Provided: yes.  Exercises were reviewed and Ruth was able to demonstrate them prior to the end of the session.  Ruth demonstrated good  understanding of the education provided.     See EMR under Patient Instructions for exercises provided 6/9/2022.    Assessment   Ruth tolerated exercises well today with no complaints of pain in both extension and flexion standing  positions. Slump test positive with full tension position with reproduction of symptoms into L glute/upper thigh.  Pt educated on pain science, reviewed the Revised Neurophysiology of Pain Questionnaire, and discussed her objective improvements with PT.  Pt educated to continue HEP.       Ruth Is progressing well towards her goals.   Pt prognosis is Good.     Pt will continue to benefit from skilled outpatient physical therapy to address the deficits listed in the problem list box on initial evaluation, provide pt/family education and to maximize pt's level of independence in the home and community environment.     Pt's spiritual, cultural and educational needs considered and pt agreeable to plan of care and goals.     Anticipated barriers to physical therapy: none    Goals:  GOALS: Short Term Goals:  4 weeks  1.Report decreased knee pain  < / =  2/10  to increase tolerance for work and running activities  2. Pt will demonstrate a (-) Slump test to demonstrate decreased neural mechanosensivity.  3. Increase strength by 1/3 MMT grade in quad strength  to increase tolerance for ADL and work activities.  4. 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   3.Increase strength to >/= 4+/5 in quad/hip  to increase tolerance for ADL and work activities.  4. Pt will report a 20% impaired on FOTO hip to demonstrate increase in LE function with every day tasks.      Plan   Plan of care Certification: 6/6/2022 to 8/6/2022.     Davis Harry, SPT     I attest that I was present for the entirety of the treatment and agree to the assessment mentioned above.     Manuel Deras, PT

## 2022-07-19 ENCOUNTER — CLINICAL SUPPORT (OUTPATIENT)
Dept: REHABILITATION | Facility: HOSPITAL | Age: 52
End: 2022-07-19
Payer: COMMERCIAL

## 2022-07-19 DIAGNOSIS — M25.652 DECREASED RANGE OF LEFT HIP MOVEMENT: ICD-10-CM

## 2022-07-19 DIAGNOSIS — R53.1 DECREASED STRENGTH: ICD-10-CM

## 2022-07-19 DIAGNOSIS — R29.3 POSTURE ABNORMALITY: Primary | ICD-10-CM

## 2022-07-19 PROCEDURE — 97112 NEUROMUSCULAR REEDUCATION: CPT

## 2022-07-19 PROCEDURE — 97110 THERAPEUTIC EXERCISES: CPT

## 2022-07-19 PROCEDURE — 97140 MANUAL THERAPY 1/> REGIONS: CPT

## 2022-07-19 NOTE — PROGRESS NOTES
"  Physical Therapy Treatment Note     Name: Ruth Harper  Clinic Number: [id number]    Therapy Diagnosis:   Encounter Diagnoses   Name Primary?    Posture abnormality Yes    Decreased range of left hip movement     Decreased strength      Physician: Tunde Venegas,*    Visit Date: 7/19/2022    Physician Orders: PT Eval and Treat   Medical Diagnosis from Referral: M70.72 (ICD-10-CM) - Ischial bursitis of left side  Evaluation Date: 6/6/2022  Authorization Period Expiration:12/31/2022  Plan of Care Expiration: 8/1/2022  Visit # / Visits authorized: 8/24     Time In: 1000 a  Time Out: 1100 a   Total Appointment Time (timed & untimed codes):  60 minutes     Precautions: Standard     Initial FOTO: 33% (20% predicted)   FOTO 1st F/U: 33% (6/29/22)   FOTO 2nd F/U:   Subjective     Pt reports: she is feeling better today and did not wake up with stiffness or pain.       She was compliant with home exercise program.  Response to previous treatment: n/a  Functional change: ongoing    Pain: 1/10  Location: left buttocks      Objective     Assessment (6/23):    Lumbar segmental mobility: decreased mobility L1-L3   Slump: (-) full ext and DF    HS length: WNL B    Lumbar flexion: painful    Lumbar ext: full and pain-free      Assessment (6/29):   Slump: (+) with full ext and DF    Lumbar flexion: not painful    Lumbar extension: pain-free; "feels better than last time"   James Test: L more stiff than R     Assessment (7/6):    Slump: (+) LLE    Lumbar Flexion: not painful    Lumbar Ext isolated/SB/Rotation: radiating pain down the leg.     Assessment (7/14):   Slump: (+) LLE with full tensioner    Lumbar ROM: painful with extension, pain-free with flexion       Ruth received therapeutic exercises to develop strength, endurance and ROM for 20 minutes including:    DL shuttle 100# 3 x 12 YTB around knees   Standing hip flexor stretch with PPT 3 x 30" bilat   Education - HEP  Revised Neurophysiology of Pain " Questionnaire      NT:  Quadruped Thoracic Ext and Rot 2 x 10 B   Prone Press Ups 2 x 10 - pain-free; felt better   Supine Thoracic Ext with half foam along back x 20 with exhale at end range of BUE flexio  Standing hip ext leaning on mat 2 x 10 B   SL Open Books x 20 with exhale at end-range   Supine Heel slides with TA 2 x 10 B   Half kneel HF stretch 3 x 20sec B with lumbar neutral   Education - pain-free nerve sliders / lifting mechanics / lumbar roll      Ruth received the following manual therapy techniques: Joint mobilizations were applied to the: hip/thoracic for 10 minutes, including:     Prone thoracic mobs, grade 4  Prone CT junction gapping mobs, grade 3/4       Not today:   Long axis hip distraction manipulation   Lumbar flexion mob L1-L3  Prone thoracic manipulation to multiple levels - pt agreeable  Lumbar gapping mob; L2-L4   Contract relax 1jt and 2jt HF's 3 x 8   Ant [place] R L hip; grade 3     Ruth participated in neuromuscular re-education activities to improve: Coordination, Kinesthetic, Sense and Proprioception for 30 minutes. The following activities were included:    Paloff press GTB w PPT in mini squat position 2 x 15 B   Tripod LE Hip Ext 2 x 10 B   Quadruped alternating UE/LE 2 x 10   Wall sit with PPT 2 x  10   Lateral stepping GTB at ankles 3 laps - cueing for PPT and keeping toes forward   Hip Hinging with dowel to maintain lumbar neutral - 30x       Not today:   Bridges YTB w TA contraction/PPT 3 x 10   PPT with mini squat 2 x 10   TA activation/PPT w marching 2 x 15 - NT    Home Exercises Provided and Patient Education Provided     Education provided:   - HEP   - mobility exercises 2x/day   - no tensioners.; pain-free nerve mobility     Written Home Exercises Provided: yes.  Exercises were reviewed and Ruth was able to demonstrate them prior to the end of the session.  Ruth demonstrated good  understanding of the education provided.     See EMR under Patient  Instructions for exercises provided 6/9/2022.    Assessment   Ruth presented today with minimal pain radiating into her L glute with lumbar end-range extension which has centralized from the previous week.  She tolerated exercise progressions well today but requires verbal and tactile cueing in order to maintain lumbar neutral. Treatment to focus on maintaining core stability with dynamic and standing exercises.  Pt educated to continue HEP.       Ruth Is progressing well towards her goals.   Pt prognosis is Good.     Pt will continue to benefit from skilled outpatient physical therapy to address the deficits listed in the problem list box on initial evaluation, provide pt/family education and to maximize pt's level of independence in the home and community environment.     Pt's spiritual, cultural and educational needs considered and pt agreeable to plan of care and goals.     Anticipated barriers to physical therapy: none    Goals:  GOALS: Short Term Goals:  4 weeks  1.Report decreased knee pain  < / =  2/10  to increase tolerance for work and running activities  2. Pt will demonstrate a (-) Slump test to demonstrate decreased neural mechanosensivity.  3. Increase strength by 1/3 MMT grade in quad strength  to increase tolerance for ADL and work activities.  4. 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   3.Increase strength to >/= 4+/5 in quad/hip  to increase tolerance for ADL and work activities.  4. Pt will report a 20% impaired on FOTO hip to demonstrate increase in LE function with every day tasks.      Plan   Plan of care Certification: 6/6/2022 to 8/6/2022.     Davis Harry, ADILIA     I attest that I was present for the entirety of the treatment and agree to the assessment  mentioned above.     Manuel Deras, PT

## 2022-07-20 NOTE — PROGRESS NOTES
"  Physical Therapy Treatment Note     Name: Ruth Harper  Clinic Number: [id number]    Therapy Diagnosis:   Encounter Diagnoses   Name Primary?    Posture abnormality Yes    Decreased range of left hip movement     Decreased strength     Ischial bursitis of left side      Physician: Tunde Venegas,*    Visit Date: 7/21/2022    Physician Orders: PT Eval and Treat   Medical Diagnosis from Referral: M70.72 (ICD-10-CM) - Ischial bursitis of left side  Evaluation Date: 6/6/2022  Authorization Period Expiration:12/31/2022  Plan of Care Expiration: 8/1/2022  Visit # / Visits authorized: 9/24     Time In: 1000a  Time Out: 1055a   Total Appointment Time (timed & untimed codes):  55 minutes     Precautions: Standard     Initial FOTO: 33% (20% predicted)   FOTO 1st F/U: 33% (6/29/22)   FOTO 2nd F/U:   Subjective     Pt reports: she is having a bad day today and her PT friend told her she will probably need surgery. Pt states she went up a lot of stairs at work.       She was compliant with home exercise program.  Response to previous treatment: n/a  Functional change: ongoing    Pain: 1/10  Location: left buttocks      Objective     Assessment (6/23):    Lumbar segmental mobility: decreased mobility L1-L3   Slump: (-) full ext and DF    HS length: WNL B    Lumbar flexion: painful    Lumbar ext: full and pain-free      Assessment (6/29):   Slump: (+) with full ext and DF    Lumbar flexion: not painful    Lumbar extension: pain-free; "feels better than last time"   James Test: L more stiff than R     Assessment (7/6):    Slump: (+) LLE    Lumbar Flexion: not painful    Lumbar Ext isolated/SB/Rotation: radiating pain down the leg.     Assessment (7/14):   Slump: (+) LLE with full tensioner    Lumbar ROM: painful with extension, pain-free with flexion       Ruth received therapeutic exercises to develop strength, endurance and ROM for 00 minutes including:    DL shuttle 100# 3 x 12 YTB around knees   Standing hip " "flexor stretch with PPT 3 x 30" bilat   Education - HEP  Revised Neurophysiology of Pain Questionnaire      NT:  Quadruped Thoracic Ext and Rot 2 x 10 B   Prone Press Ups 2 x 10 - pain-free; felt better   Supine Thoracic Ext with half foam along back x 20 with exhale at end range of BUE flexio  Standing hip ext leaning on mat 2 x 10 B   SL Open Books x 20 with exhale at end-range   Supine Heel slides with TA 2 x 10 B   Half kneel HF stretch 3 x 20sec B with lumbar neutral   Education - pain-free nerve sliders / lifting mechanics / lumbar roll      Ruth received the following manual therapy techniques: Joint mobilizations were applied to the: hip/thoracic for 15 minutes, including:     Prone thoracic mobs, grade 4  Prone CT junction gapping mobs, grade 3/4   Long axis Hip distraction on L   Lumbar gapping on L in R SL; grade 4      Not today:   Long axis hip distraction manipulation   Lumbar flexion mob L1-L3  Prone thoracic manipulation to multiple levels - pt agreeable  Lumbar gapping mob; L2-L4   Contract relax 1jt and 2jt HF's 3 x 8   Ant Eustace R L hip; grade 3     Ruth participated in neuromuscular re-education activities to improve: Coordination, Kinesthetic, Sense and Proprioception for 40 minutes. The following activities were included:    Paloff press MTB w PPT supine with perturbations 3 x 15 B   Tripod LE Hip Ext 2 x 10 B   Quadruped alternating UE 2 x 10 B   SL Clams with TA contraction  Supine Hip/Knees at 90/90, alt LE ext 5 x 5   Bridges with PPT 3 x 10       Not today:      Wall sit with PPT 2 x  10   Lateral stepping GTB at ankles 3 laps - cueing for PPT and keeping toes forward   Hip Hinging with dowel to maintain lumbar neutral - 30x   Bridges YTB w TA contraction/PPT 3 x 10   PPT with mini squat 2 x 10   TA activation/PPT w marching 2 x 15 - NT    Home Exercises Provided and Patient Education Provided     Education provided:   - HEP   - mobility exercises 2x/day   - no tensioners.; " pain-free nerve mobility     Written Home Exercises Provided: yes.  Exercises were reviewed and Ruth was able to demonstrate them prior to the end of the session.  Ruth demonstrated good  understanding of the education provided.     See EMR under Patient Instructions for exercises provided 6/9/2022.    Assessment   Ruth presented today with increased pain after going up and down a lot of stairs yesterday. I elected to unloaded exercises focusing on hip/core stability. Pt left the clinic stating she felt better. Pt brought in her MRI report today which showed an 3mm annulus tear at L4-L5 disc. Pt states her friend told her she would need surgery. I continued to expressed to the patient that her symptoms have improved with PT and that flare ups are common with LBP and that she should continue to trial PT for the time being. Pt verbalized understanding.     Ruth Is progressing well towards her goals.   Pt prognosis is Good.     Pt will continue to benefit from skilled outpatient physical therapy to address the deficits listed in the problem list box on initial evaluation, provide pt/family education and to maximize pt's level of independence in the home and community environment.     Pt's spiritual, cultural and educational needs considered and pt agreeable to plan of care and goals.     Anticipated barriers to physical therapy: none    Goals:  GOALS: Short Term Goals:  4 weeks  1.Report decreased knee pain  < / =  2/10  to increase tolerance for work and running activities  2. Pt will demonstrate a (-) Slump test to demonstrate decreased neural mechanosensivity.  3. Increase strength by 1/3 MMT grade in quad strength  to increase tolerance for ADL and work activities.  4. 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   3.Increase strength to >/= 4+/5 in quad/hip  to increase tolerance for ADL and work activities.  4. Pt will report a 20% impaired on FOTO hip to demonstrate increase in LE function with every day tasks.      Plan   Plan of care Certification: 6/6/2022 to 8/6/2022.     Davis aHrry, SPT     I attest that I was present for the entirety of the treatment and agree to the assessment mentioned above.     Manuel Deras, PT

## 2022-07-21 ENCOUNTER — CLINICAL SUPPORT (OUTPATIENT)
Dept: REHABILITATION | Facility: HOSPITAL | Age: 52
End: 2022-07-21
Payer: COMMERCIAL

## 2022-07-21 DIAGNOSIS — R53.1 DECREASED STRENGTH: ICD-10-CM

## 2022-07-21 DIAGNOSIS — M25.652 DECREASED RANGE OF LEFT HIP MOVEMENT: ICD-10-CM

## 2022-07-21 DIAGNOSIS — M70.72 ISCHIAL BURSITIS OF LEFT SIDE: ICD-10-CM

## 2022-07-21 DIAGNOSIS — R29.3 POSTURE ABNORMALITY: Primary | ICD-10-CM

## 2022-07-21 PROCEDURE — 97140 MANUAL THERAPY 1/> REGIONS: CPT

## 2022-07-21 PROCEDURE — 97112 NEUROMUSCULAR REEDUCATION: CPT

## 2022-07-26 ENCOUNTER — CLINICAL SUPPORT (OUTPATIENT)
Dept: REHABILITATION | Facility: HOSPITAL | Age: 52
End: 2022-07-26
Payer: COMMERCIAL

## 2022-07-26 DIAGNOSIS — R53.1 DECREASED STRENGTH: ICD-10-CM

## 2022-07-26 DIAGNOSIS — R29.3 POSTURE ABNORMALITY: Primary | ICD-10-CM

## 2022-07-26 DIAGNOSIS — M25.652 DECREASED RANGE OF LEFT HIP MOVEMENT: ICD-10-CM

## 2022-07-26 PROCEDURE — 97140 MANUAL THERAPY 1/> REGIONS: CPT

## 2022-07-26 PROCEDURE — 97110 THERAPEUTIC EXERCISES: CPT

## 2022-07-26 PROCEDURE — 97112 NEUROMUSCULAR REEDUCATION: CPT

## 2022-07-26 NOTE — PROGRESS NOTES
"  Physical Therapy Treatment Note     Name: Ruth Harper  Clinic Number: [id number]    Therapy Diagnosis:   Encounter Diagnoses   Name Primary?    Posture abnormality Yes    Decreased range of left hip movement     Decreased strength      Physician: Tunde Venegas,*    Visit Date: 7/26/2022    Physician Orders: PT Eval and Treat   Medical Diagnosis from Referral: M70.72 (ICD-10-CM) - Ischial bursitis of left side  Evaluation Date: 6/6/2022  Authorization Period Expiration:12/31/2022  Plan of Care Expiration: 8/1/2022  Visit # / Visits authorized: 12/24     Time In: 100 p  Time Out: 200 p    Total Appointment Time (timed & untimed codes):  60 minutes     Precautions: Standard     Initial FOTO: 33% (20% predicted)   FOTO 1st F/U: 33% (6/29/22)   FOTO 2nd F/U:   Subjective     Pt reports: she is feeling better today. Feels good with her at-home exercises and feels like she is making progress.       She was compliant with home exercise program.  Response to previous treatment: n/a  Functional change: ongoing    Pain: 1/10  Location: left buttocks      Objective     Assessment (6/23):    Lumbar segmental mobility: decreased mobility L1-L3   Slump: (-) full ext and DF    HS length: WNL B    Lumbar flexion: painful    Lumbar ext: full and pain-free      Assessment (6/29):   Slump: (+) with full ext and DF    Lumbar flexion: not painful    Lumbar extension: pain-free; "feels better than last time"   James Test: L more stiff than R     Assessment (7/6):    Slump: (+) LLE    Lumbar Flexion: not painful    Lumbar Ext isolated/SB/Rotation: radiating pain down the leg.     Assessment (7/14):   Slump: (+) LLE with full tensioner    Lumbar ROM: painful with extension, pain-free with flexion     Assessment: (7/26)   Slump: (+) L LE with full DF and knee ext    SLR: (-) deepthi Miranda received therapeutic exercises to develop strength, endurance and ROM for 10 minutes including:    DL shuttle 100# 3 x 12 YTB " "  Standing hip flexor stretch with PPT 3 x 30" bilat   Education - HEP     NT:  Quadruped Thoracic Ext and Rot 2 x 10 B   Prone Press Ups 2 x 10 - pain-free; felt better   Supine Thoracic Ext with half foam along back x 20 with exhale at end range of BUE flexio  Standing hip ext leaning on mat 2 x 10 B   SL Open Books x 20 with exhale at end-range   Supine Heel slides with TA 2 x 10 B   Half kneel HF stretch 3 x 20sec B with lumbar neutral   Education - pain-free nerve sliders / lifting mechanics / lumbar roll      Ruth received the following manual therapy techniques: Joint mobilizations were applied to the: hip/thoracic for 10 minutes, including:     Prone thoracic mobs, grade 4  Prone CT junction gapping mobs, grade 3/4   Long axis Hip distraction on L       Not today:   Long axis hip distraction manipulation   Lumbar flexion mob L1-L3  Lumbar gapping on L in R SL; grade 4  Prone thoracic manipulation to multiple levels - pt agreeable  Lumbar gapping mob; L2-L4   Contract relax 1jt and 2jt HF's 3 x 8   Ant Dora R L hip; grade 3     Ruth participated in neuromuscular re-education activities to improve: Coordination, Kinesthetic, Sense and Proprioception for 40 minutes. The following activities were included:    Paloff press MTB w PPT supine + perturbations 15x    Supine with PPT + UE at 90/120 degrees + perturbations 15x   Trunk flexion over Tball alternating LE 2 x 10 B   Lateral stepping GTB at ankles 3 laps  Hip Hinging with dowel to maintain lumbar neutral - 30x   Supine Hip/Knees at 90/90 TA bracing + perturbations 15x    Bridges with PPT 3 x 10       Not today:   Wall sit with PPT 2 x  10   SL Clams with TA contraction  Tripod LE Hip Ext 2 x 10 B   Bridges YTB w TA contraction/PPT 3 x 10   PPT with mini squat 2 x 10   TA activation/PPT w marching 2 x 15 - NT    Home Exercises Provided and Patient Education Provided     Education provided:   - HEP   - mobility exercises 2x/day   - no tensioners.; " pain-free nerve mobility     Written Home Exercises Provided: yes.  Exercises were reviewed and Ruth was able to demonstrate them prior to the end of the session.  Ruth demonstrated good  understanding of the education provided.     See EMR under Patient Instructions for exercises provided 6/9/2022.    Assessment   Ruth presented today with improved symptoms after last visit and demonstrated improved ability to tolerate standing exercises while incorporating PPT and TA bracing. She reports feeling better and that she has noticed her progressions with PT since starting. Future visits will focus on building on core stability and LE hip strength and mobility to aid in supporting trunk with lifting, ADLs, and gait mechanics.     Ruth Is progressing well towards her goals.   Pt prognosis is Good.     Pt will continue to benefit from skilled outpatient physical therapy to address the deficits listed in the problem list box on initial evaluation, provide pt/family education and to maximize pt's level of independence in the home and community environment.     Pt's spiritual, cultural and educational needs considered and pt agreeable to plan of care and goals.     Anticipated barriers to physical therapy: none    Goals:  GOALS: Short Term Goals:  4 weeks  1.Report decreased knee pain  < / =  2/10  to increase tolerance for work and running activities  2. Pt will demonstrate a (-) Slump test to demonstrate decreased neural mechanosensivity.  3. Increase strength by 1/3 MMT grade in quad strength  to increase tolerance for ADL and work activities.  4. 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   3.Increase strength to >/= 4+/5 in quad/hip  to increase tolerance for ADL and work  activities.  4. Pt will report a 20% impaired on FOTO hip to demonstrate increase in LE function with every day tasks.      Plan   Plan of care Certification: 6/6/2022 to 8/6/2022.     Davis Harry, SPT     I attest that I was present for the entirety of the treatment and agree to the assessment mentioned above.     Manuel Deras, PT

## 2022-07-28 ENCOUNTER — CLINICAL SUPPORT (OUTPATIENT)
Dept: REHABILITATION | Facility: HOSPITAL | Age: 52
End: 2022-07-28
Payer: COMMERCIAL

## 2022-07-28 DIAGNOSIS — R29.3 POSTURE ABNORMALITY: Primary | ICD-10-CM

## 2022-07-28 DIAGNOSIS — M25.652 DECREASED RANGE OF LEFT HIP MOVEMENT: ICD-10-CM

## 2022-07-28 DIAGNOSIS — R53.1 DECREASED STRENGTH: ICD-10-CM

## 2022-07-28 PROCEDURE — 97110 THERAPEUTIC EXERCISES: CPT

## 2022-07-28 PROCEDURE — 97140 MANUAL THERAPY 1/> REGIONS: CPT

## 2022-07-28 PROCEDURE — 97112 NEUROMUSCULAR REEDUCATION: CPT

## 2022-07-28 NOTE — PROGRESS NOTES
"  Physical Therapy Treatment Note     Name: Ruth Harper  Clinic Number: [id number]    Therapy Diagnosis:   Encounter Diagnoses   Name Primary?    Posture abnormality Yes    Decreased strength     Decreased range of left hip movement      Physician: Tunde Venegas,*    Visit Date: 7/28/2022    Physician Orders: PT Eval and Treat   Medical Diagnosis from Referral: M70.72 (ICD-10-CM) - Ischial bursitis of left side  Evaluation Date: 6/6/2022  Authorization Period Expiration:12/31/2022  Plan of Care Expiration: 8/1/2022  Visit # / Visits authorized: 13/24     Time In: 200 p  Time Out: 300 p    Total Appointment Time (timed & untimed codes):  60 minutes     Precautions: Standard     Initial FOTO: 33% (20% predicted)   FOTO 1st F/U: 33% (6/29/22)   FOTO 2nd F/U:   Subjective     Pt reports: she is feeling good today.     She was compliant with home exercise program.  Response to previous treatment: n/a  Functional change: ongoing    Pain: 1/10  Location: left buttocks      Objective     Assessment (6/23):    Lumbar segmental mobility: decreased mobility L1-L3   Slump: (-) full ext and DF    HS length: WNL B    Lumbar flexion: painful    Lumbar ext: full and pain-free      Assessment (6/29):   Slump: (+) with full ext and DF    Lumbar flexion: not painful    Lumbar extension: pain-free; "feels better than last time"   James Test: L more stiff than R     Assessment (7/6):    Slump: (+) LLE    Lumbar Flexion: not painful    Lumbar Ext isolated/SB/Rotation: radiating pain down the leg.     Assessment (7/14):   Slump: (+) LLE with full tensioner    Lumbar ROM: painful with extension, pain-free with flexion     Assessment: (7/26)   Slump: (+) L LE with full DF and knee ext    SLR: (-) deepthi Miranda received therapeutic exercises to develop strength, endurance and ROM for 10 minutes including:     DL shuttle 100# 3 x 12 YTB   Standing hip flexor stretch with PPT 4 x 30" bilat    Education - HEP   "   NT:  Quadruped Thoracic Ext and Rot 2 x 10 B   Prone Press Ups 2 x 10 - pain-free; felt better   Supine Thoracic Ext with half foam along back x 20 with exhale at end range of BUE flexio  Standing hip ext leaning on mat 2 x 10 B   SL Open Books x 20 with exhale at end-range   Supine Heel slides with TA 2 x 10 B   Half kneel HF stretch 3 x 20sec B with lumbar neutral   Education - pain-free nerve sliders / lifting mechanics / lumbar roll      Ruth received the following manual therapy techniques: Joint mobilizations were applied to the: hip/thoracic for 10 minutes, including:     Prone thoracic mobs, grade 4  Prone CT junction gapping mobs, grade 3/4   Contract relax 1jt and 2jt HF's 3 x 8       Not today:   Long axis hip distraction manipulation   Long axis Hip distraction    Lumbar flexion mob L1-L3  Lumbar gapping on L in R SL; grade 4  Prone thoracic manipulation to multiple levels - pt agreeable  Lumbar gapping mob; L2-L4   Ant Glide R L hip; grade 3       Ruth participated in neuromuscular re-education activities to improve: Coordination, Kinesthetic, Sense and Proprioception for 40 minutes. The following activities were included:    Paloff press MTB w PPT supine + perturbations 15x    Supine with PPT + UE at 90/120 degrees + perturbations 15x   Standing Paloff press BTB w KTB perturbations   Seated thoracic extension 2 x 15 - (feet on stool to prevent lumbar movement)   Lateral stepping GTB at ankles 3 laps  Hip Hinging with dowel to maintain lumbar neutral - 30x  Hip hinging from low mat with 20lb KTB - 20x    NT:  Trunk flexion over Tball alternating LE 2 x 10 B    Supine Hip/Knees at 90/90 TA bracing + perturbations 15x    Bridges with PPT 3 x 10   Wall sit with PPT 2 x  10   SL Clams with TA contraction  Tripod LE Hip Ext 2 x 10 B   Bridges YTB w TA contraction/PPT 3 x 10   PPT with mini squat 2 x 10   TA activation/PPT w marching 2 x 15 - NT    Home Exercises Provided and Patient Education  Provided     Education provided:   - HEP   - mobility exercises 2x/day   - no tensioners.; pain-free nerve mobility     Written Home Exercises Provided: yes.  Exercises were reviewed and Ruth was able to demonstrate them prior to the end of the session.  Ruth demonstrated good  understanding of the education provided.     See EMR under Patient Instructions for exercises provided 6/9/2022.    Assessment   Ruth presented today with improved symptoms following last visit and has maintained these improved symptoms for longer than usual. She demonstrated an improved ability to progress her standing exercises with more challenging core progressions. She still requires VC and TC with about 25% of her hip hinge pattern movements but can usually self-correct. Future visits to focus on building on her core stability, improving motor pattern with proper hip hinging form, and LE hip strength.     Ruth Is progressing well towards her goals.   Pt prognosis is Good.     Pt will continue to benefit from skilled outpatient physical therapy to address the deficits listed in the problem list box on initial evaluation, provide pt/family education and to maximize pt's level of independence in the home and community environment.     Pt's spiritual, cultural and educational needs considered and pt agreeable to plan of care and goals.     Anticipated barriers to physical therapy: none    Goals:  GOALS: Short Term Goals:  4 weeks  1.Report decreased knee pain  < / =  2/10  to increase tolerance for work and running activities  2. Pt will demonstrate a (-) Slump test to demonstrate decreased neural mechanosensivity.  3. Increase strength by 1/3 MMT grade in quad strength  to increase tolerance for ADL and work activities.  4. 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   3.Increase strength to >/= 4+/5 in quad/hip  to increase tolerance for ADL and work activities.  4. Pt will report a 20% impaired on FOTO hip to demonstrate increase in LE function with every day tasks.      Plan   Plan of care Certification: 6/6/2022 to 8/6/2022.     Davis Harry, SPT     I attest that I was present for the entirety of the treatment and agree to the assessment mentioned above.     Manuel Deras, PT

## 2022-08-02 ENCOUNTER — CLINICAL SUPPORT (OUTPATIENT)
Dept: REHABILITATION | Facility: HOSPITAL | Age: 52
End: 2022-08-02
Payer: COMMERCIAL

## 2022-08-02 DIAGNOSIS — R53.1 DECREASED STRENGTH: ICD-10-CM

## 2022-08-02 DIAGNOSIS — R29.3 POSTURE ABNORMALITY: Primary | ICD-10-CM

## 2022-08-02 DIAGNOSIS — M25.652 DECREASED RANGE OF LEFT HIP MOVEMENT: ICD-10-CM

## 2022-08-02 PROCEDURE — 97110 THERAPEUTIC EXERCISES: CPT

## 2022-08-02 PROCEDURE — 97140 MANUAL THERAPY 1/> REGIONS: CPT

## 2022-08-02 PROCEDURE — 97112 NEUROMUSCULAR REEDUCATION: CPT

## 2022-08-02 NOTE — PROGRESS NOTES
"    Outpatient Therapy Updated Plan of Care     Visit Date: 8/2/2022  Name: Ruth Harper  Clinic Number: [id number]    Therapy Diagnosis:   Encounter Diagnoses   Name Primary?    Posture abnormality Yes    Decreased strength     Decreased range of left hip movement      Physician: Tuned Venegas,*     Physician Orders: PT Eval and Treat   Medical Diagnosis from Referral: M70.72 (ICD-10-CM) - Ischial bursitis of left side  Evaluation Date: 6/6/2022    Total Visits Received: 14  Cancelled Visits: 0  No Show Visits: 0    Current Certification Period:  6/1/2022 to 8/1/2022  Precautions: Standard   Visits from Evaluation Date:  14  Functional Level Prior to Evaluation: radiating pain down posterior L leg; unable to sit long periods.     Subjective     Update: She is feeling much better. Pt states she went to school and had no pain with sitting/stair climbing. Pt reports pain is well controlled but is unsure of how she will respond with school starting back up. Pt states she feels she has improved overall and thinks she does not need surgery for this.     Objective     Update:   Assessment: (8/2)   Slump: (-) L LE with full DF and knee ext    Lumbar AROM: full and pain-free   Hip Hinge: fair; requires cueing to perform correctly     Hip Abd/Ext: 4/5 B    Hip PGM: 3/5 B     Ruth received therapeutic exercises to develop strength, endurance and ROM for 30 minutes including:     Supine Thoracic Ext with half foam along back x 20 with exhale at end range of BUE flexion  Child's pose + Thoracic Ext/Rot 2 x 15 B   DL shuttle 100# 3 x 10 GTB around knees  Standing hip flexor stretch with PPT 3 x 30" bilat    Education - HEP/activity modification     Ruth received the following manual therapy techniques: Joint mobilizations were applied to the: hip/thoracic for 10 minutes, including:     Prone CT junction gapping mobs, grade 3/4   Prone thoracic manipulation - pt agreeable   Contract relax 1jt and 2jt HF's 3 x " 8     Ruth participated in neuromuscular re-education activities to improve: Coordination, Kinesthetic, Sense and Proprioception for 15  minutes. The following activities were included:    Standing Paloff press MTB 2 x 15 B    Hip Hinging with dowel on wall to maintain lumbar neutral - 30x  Hip hinging from low mat with 20lb KTB - 20x  Sit<>Stands with Hip Hinge 3 x 8       Assessment     Update: Ruth has improved greatly initial evaluation. She reports she no longer has pain radiating down her leg. Her assessment today revealed full lumbar AROM with no pain and a (-) Slump test. She states she is worried how she will respond going back to work. She continues to require cueing to perform hip hinge appropriately. I think 1x/week for 4 weeks is appropriate at this time to address motor control deficits and ensure she returns to work able to minimize flare ups.     Previous Short Term Goals Status:   GOALS: Short Term Goals:  4 weeks  1.Report decreased knee pain < / = 2/10 to increase tolerance for work and running activities. - MET (8/2/2022)  2. Pt will demonstrate a (-) Slump test to demonstrate decreased neural mechanosensivity. - MET (8/2/2022)  3. Increase strength by 1/3 MMT grade in quad strength  to increase tolerance for ADL and work activities. - MET (8/2/2022)  4. Pt to tolerate HEP to improve ROM and independence with ADL's - MET (8/2/2022)    New Short Term Goals Status:  none  Long Term Goal Status:  Modified:    1. Pt will be able to perform appropriate hip hinge pattern with sit<>stands with no cueing.   Reasons for Recertification of Therapy:   Continue to build strength and motor control to perform ip hinge appropriately.     Plan     Updated Certification Period: 8/2/2022 to 9/2/2022  Recommended Treatment Plan: 1 times per week for 4 weeks: Gait Training, Manual Therapy, Moist Heat/ Ice, Neuromuscular Re-ed, Patient Education, Self Care, Therapeutic Activities and Therapeutic  Exercise  Other Recommendations: none    Manuel Deras, PT  8/2/2022      I CERTIFY THE NEED FOR THESE SERVICES FURNISHED UNDER THIS PLAN OF TREATMENT AND WHILE UNDER MY CARE    Physician's comments:        Physician's Signature: ___________________________________________________        Manuel Deras, PT

## 2022-08-10 ENCOUNTER — CLINICAL SUPPORT (OUTPATIENT)
Dept: REHABILITATION | Facility: HOSPITAL | Age: 52
End: 2022-08-10
Payer: COMMERCIAL

## 2022-08-10 DIAGNOSIS — M25.652 DECREASED RANGE OF LEFT HIP MOVEMENT: ICD-10-CM

## 2022-08-10 DIAGNOSIS — R53.1 DECREASED STRENGTH: ICD-10-CM

## 2022-08-10 DIAGNOSIS — R29.3 POSTURE ABNORMALITY: Primary | ICD-10-CM

## 2022-08-10 PROCEDURE — 97112 NEUROMUSCULAR REEDUCATION: CPT | Mod: CQ

## 2022-08-10 PROCEDURE — 97110 THERAPEUTIC EXERCISES: CPT | Mod: CQ

## 2022-08-10 NOTE — PROGRESS NOTES
"  Outpatient Therapy Updated Plan of Care     Visit Date: 8/10/2022  Name: Ruth Harper  Clinic Number: [id number]    Therapy Diagnosis:   Encounter Diagnoses   Name Primary?    Posture abnormality Yes    Decreased strength     Decreased range of left hip movement      Physician: Tunde Venegas,*     Physician Orders: PT Eval and Treat   Medical Diagnosis from Referral: M70.72 (ICD-10-CM) - Ischial bursitis of left side  Evaluation Date: 6/6/2022  Authorization Period Expiration:12/31/2022  Plan of Care Expiration: 8/1/2022  Visit # / Visits authorized: 15/24     Time In: 1705  Time Out: 1802  Total Billable Time:  53 minutes     Precautions: Standard      Initial FOTO: 33% (20% predicted)   FOTO 1st F/U: 33% (6/29/22)   FOTO 2nd F/U:     Subjective      Pt reports: increased soreness p returning to work this week.      She was compliant with home exercise program.  Response to previous treatment: n/a  Functional change: ongoing     Pain: 5/10  Location: left buttocks      Objective     Update:   Assessment: (8/2)   Slump: (-) L LE with full DF and knee ext    Lumbar AROM: full and pain-free   Hip Hinge: fair; requires cueing to perform correctly     Hip Abd/Ext: 4/5 B    Hip PGM: 3/5 B     Ruth received therapeutic exercises to develop strength, endurance and ROM for 12 minutes including:    DL shuttle 100# 4 x 10   S/L clams YTB 12 x 10" ally    NP:  Supine Thoracic Ext with half foam along back x 20 with exhale at end range of BUE flexion  Child's pose + Thoracic Ext/Rot 2 x 15 B   Standing hip flexor stretch with PPT 3 x 30" bilat    Education - HEP/activity modification     Ruth received the following manual therapy techniques: Joint mobilizations were applied to the: hip/thoracic for 00 minutes, including:     Prone CT junction gapping mobs, grade 3/4   Prone thoracic manipulation - pt agreeable   Contract relax 1jt and 2jt HF's 3 x 8     Ruth participated in neuromuscular re-education " "activities to improve: Coordination, Kinesthetic, Sense and Proprioception for 41 minutes. The following activities were included:    PPT 15 x 10"  PPT c OH handcuffs YTB 2 x 10  PPT c iso pallof hold x 3 rounds ally  Standing Paloff press in staggered stance MTB 2 x 10 x 5" ally  Hip Hinging c dowel x 20  20in DL squat c dowel 3 x 10  Iso shldr ext hold c slow marching 2 x 10  Sit<>Stands with Hip Hinge 3 x 8 - np      Assessment     Ruth did great today with no c/o increased lumbar pain. Min cueing to avoid compensatory lumbar rotation during S/L clams. Good tolerance to core stabilization exercises. She displayed a good hip hinge, but demonstrated a dynamic valgus during squat. Encouraged continued compliance and activity modification after returning to school full time this next week. No adverse effects reported p tx.     Ruth Is progressing well towards her goals.   Pt prognosis is Good.      Pt will continue to benefit from skilled outpatient physical therapy to address the deficits listed in the problem list box on initial evaluation, provide pt/family education and to maximize pt's level of independence in the home and community environment.      Pt's spiritual, cultural and educational needs considered and pt agreeable to plan of care and goals.     Anticipated barriers to physical therapy: none    Previous Short Term Goals Status:   GOALS: Short Term Goals:  4 weeks  1.Report decreased knee pain < / = 2/10 to increase tolerance for work and running activities. - MET (8/2/2022)  2. Pt will demonstrate a (-) Slump test to demonstrate decreased neural mechanosensivity. - MET (8/2/2022)  3. Increase strength by 1/3 MMT grade in quad strength  to increase tolerance for ADL and work activities. - MET (8/2/2022)  4. Pt to tolerate HEP to improve ROM and independence with ADL's - MET (8/2/2022)    New Short Term Goals Status:  none  Long Term Goal Status:  Modified:    1. Pt will be able to perform " appropriate hip hinge pattern with sit<>stands with no cueing.   Reasons for Recertification of Therapy:   Continue to build strength and motor control to perform ip hinge appropriately.     Plan     Updated Certification Period: 8/10/2022 to 9/2/2022  Recommended Treatment Plan: 1 times per week for 4 weeks: Gait Training, Manual Therapy, Moist Heat/ Ice, Neuromuscular Re-ed, Patient Education, Self Care, Therapeutic Activities and Therapeutic Exercise  Other Recommendations: none    Charlotte Hernandez, PTA

## 2022-08-17 ENCOUNTER — CLINICAL SUPPORT (OUTPATIENT)
Dept: REHABILITATION | Facility: HOSPITAL | Age: 52
End: 2022-08-17
Payer: COMMERCIAL

## 2022-08-17 DIAGNOSIS — M25.652 DECREASED RANGE OF LEFT HIP MOVEMENT: ICD-10-CM

## 2022-08-17 DIAGNOSIS — R53.1 DECREASED STRENGTH: Primary | ICD-10-CM

## 2022-08-17 DIAGNOSIS — R29.3 POSTURE ABNORMALITY: ICD-10-CM

## 2022-08-17 PROCEDURE — 97112 NEUROMUSCULAR REEDUCATION: CPT | Mod: CQ

## 2022-08-17 PROCEDURE — 97110 THERAPEUTIC EXERCISES: CPT | Mod: CQ

## 2022-08-17 NOTE — PROGRESS NOTES
"  Outpatient Therapy Updated Plan of Care     Visit Date: 8/17/2022  Name: Ruth Harper  Clinic Number: [id number]    Therapy Diagnosis:   Encounter Diagnoses   Name Primary?    Decreased strength Yes    Posture abnormality     Decreased range of left hip movement      Physician: Tunde Venegas,*     Physician Orders: PT Eval and Treat   Medical Diagnosis from Referral: M70.72 (ICD-10-CM) - Ischial bursitis of left side  Evaluation Date: 6/6/2022  Authorization Period Expiration:12/31/2022  Plan of Care Expiration: 8/1/2022  Visit # / Visits authorized: 16/24     Time In: 1700  Time Out: 1800  Total Billable Time:  54 minutes     Precautions: Standard      Initial FOTO: 33% (20% predicted)   FOTO 1st F/U: 33% (6/29/22)   FOTO 2nd F/U:     Subjective      Pt reports: no shooting pain down her leg or consistent pain throughout the day. Her pain has centralized to an ache in her L buttocks and mostly at night.      She was compliant with home exercise program.  Response to previous treatment: n/a  Functional change: ongoing     Pain: 0/10  Location: left buttocks      Objective     Update:   Assessment: (8/2)   Slump: (-) L LE with full DF and knee ext    Lumbar AROM: full and pain-free   Hip Hinge: fair; requires cueing to perform correctly     Hip Abd/Ext: 4/5 B    Hip PGM: 3/5 B     Ruth received therapeutic exercises to develop strength, endurance and ROM for 14 minutes including:    DL shuttle 112.5# 4 x 10   Bridges YTB 20 x 5"  S/L clams YTB 12 x 10" ally    NP:  Supine Thoracic Ext with half foam along back x 20 with exhale at end range of BUE flexion  Child's pose + Thoracic Ext/Rot 2 x 15 B   Standing hip flexor stretch with PPT 3 x 30" bilat    Education - HEP/activity modification     Ruth received the following manual therapy techniques: Joint mobilizations were applied to the: hip/thoracic for 00 minutes, including:     Prone CT junction gapping mobs, grade 3/4   Prone thoracic " "manipulation - pt agreeable   Contract relax 1jt and 2jt HF's 3 x 8     Ruth participated in neuromuscular re-education activities to improve: Coordination, Kinesthetic, Sense and Proprioception for 40 minutes. The following activities were included:    PPT 10 x 10"  PPT c OH handcuffs YTB 3 x 10  PPT c iso pallof hold x 3 rounds ally  Standing Paloff press in staggered stance MTB 2 x 10 x 5" ally  Hip Hinging c dowel x 20  20in DL squat c dowel 3 x 10  20in DL squat s dowel 2 x 10  Lateral walks c neutral spine YTB x 2 turf laps  Iso shldr ext hold c slow marching OTB 2 x 10  Sit<>Stands with Hip Hinge 3 x 8 - np        Assessment     Ruth did great today with no c/o increased lumbar pain. She displayed a good hip hinge and DL squat c dowel, but required further reps/cueing for proper squat form s dowel feedback. Encouraged continued compliance with HEP; pt voiced understanding. No adverse effects reported p tx.     Ruth Is progressing well towards her goals.   Pt prognosis is Good.      Pt will continue to benefit from skilled outpatient physical therapy to address the deficits listed in the problem list box on initial evaluation, provide pt/family education and to maximize pt's level of independence in the home and community environment.      Pt's spiritual, cultural and educational needs considered and pt agreeable to plan of care and goals.     Anticipated barriers to physical therapy: none    Previous Short Term Goals Status:   GOALS: Short Term Goals:  4 weeks  1.Report decreased knee pain < / = 2/10 to increase tolerance for work and running activities. - MET (8/2/2022)  2. Pt will demonstrate a (-) Slump test to demonstrate decreased neural mechanosensivity. - MET (8/2/2022)  3. Increase strength by 1/3 MMT grade in quad strength  to increase tolerance for ADL and work activities. - MET (8/2/2022)  4. Pt to tolerate HEP to improve ROM and independence with ADL's - MET (8/2/2022)    New Short Term " Goals Status:  none  Long Term Goal Status:  Modified:    1. Pt will be able to perform appropriate hip hinge pattern with sit<>stands with no cueing.   Reasons for Recertification of Therapy:   Continue to build strength and motor control to perform ip hinge appropriately.     Plan     Updated Certification Period: 8/17/2022 to 9/2/2022  Recommended Treatment Plan: 1 times per week for 4 weeks: Gait Training, Manual Therapy, Moist Heat/ Ice, Neuromuscular Re-ed, Patient Education, Self Care, Therapeutic Activities and Therapeutic Exercise  Other Recommendations: none    Charlotte Hernandez, PTA

## 2022-08-26 ENCOUNTER — CLINICAL SUPPORT (OUTPATIENT)
Dept: REHABILITATION | Facility: HOSPITAL | Age: 52
End: 2022-08-26
Payer: COMMERCIAL

## 2022-08-26 DIAGNOSIS — M25.652 DECREASED RANGE OF LEFT HIP MOVEMENT: ICD-10-CM

## 2022-08-26 DIAGNOSIS — R53.1 DECREASED STRENGTH: Primary | ICD-10-CM

## 2022-08-26 DIAGNOSIS — R29.3 POSTURE ABNORMALITY: ICD-10-CM

## 2022-08-26 PROCEDURE — 97112 NEUROMUSCULAR REEDUCATION: CPT | Mod: CQ

## 2022-08-26 PROCEDURE — 97110 THERAPEUTIC EXERCISES: CPT | Mod: CQ

## 2022-08-26 NOTE — PROGRESS NOTES
"  Outpatient Therapy Updated Plan of Care     Visit Date: 8/26/2022  Name: Ruth Harper  Clinic Number: [id number]    Therapy Diagnosis:   Encounter Diagnoses   Name Primary?    Decreased strength Yes    Posture abnormality     Decreased range of left hip movement      Physician: Tunde Venegas,*     Physician Orders: PT Eval and Treat   Medical Diagnosis from Referral: M70.72 (ICD-10-CM) - Ischial bursitis of left side  Evaluation Date: 6/6/2022  Authorization Period Expiration:12/31/2022  Plan of Care Expiration: 8/1/2022  Visit # / Visits authorized: 17/24     Time In: 1600  Time Out: 1700  Total Billable Time:  56 minutes     Precautions: Standard      Initial FOTO: 33% (20% predicted)   FOTO 1st F/U: 33% (6/29/22)   FOTO 2nd F/U:     Subjective      Pt reports: overall improvement in back pain/radiculopathy. She rested this week d/t recent covid diagnosis.       She was compliant with home exercise program.  Response to previous treatment: n/a  Functional change: ongoing     Pain: 0/10  Location: left buttocks      Objective     Update:   Assessment: (8/2)   Slump: (-) L LE with full DF and knee ext    Lumbar AROM: full and pain-free   Hip Hinge: fair; requires cueing to perform correctly     Hip Abd/Ext: 4/5 B    Hip PGM: 3/5 B     Ruth received therapeutic exercises to develop strength, endurance and ROM for 18 minutes including:    DL shuttle 112.5# 4 x 10   Bridges YTB 20 x 5"  S/L clams YTB 12 x 10" ally    NP:  Supine Thoracic Ext with half foam along back x 20 with exhale at end range of BUE flexion  Child's pose + Thoracic Ext/Rot 2 x 15 B   Standing hip flexor stretch with PPT 3 x 30" bilat    Education - HEP/activity modification     Ruth received the following manual therapy techniques: Joint mobilizations were applied to the: hip/thoracic for 00 minutes, including:     Prone CT junction gapping mobs, grade 3/4   Prone thoracic manipulation - pt agreeable   Contract relax 1jt and 2jt " "HF's 3 x 8     Ruth participated in neuromuscular re-education activities to improve: Coordination, Kinesthetic, Sense and Proprioception for 38 minutes. The following activities were included:    PPT 10 x 10"  TA marching 2 x 10  TA BKFO 2 x 10  Standing Paloff press in staggered stance MTB 2 x 10 x 5" ally  Hip Hinging c dowel x 20  20in DL squat s dowel 2 x 10  Iso shldr ext hold c slow marching OTB 2 x 10    NP:  Sit<>Stands with Hip Hinge 3 x 8   PPT c OH handcuffs YTB 3 x 10  PPT c iso pallof hold x 3 rounds ally  Lateral walks c neutral spine YTB x 2 turf laps       Assessment     Ruth did great today with no c/o increased lumbar pain. Squat form continues to improve with min cueing required. Held cardio therex d/t recent covid diagnosis/SOB. Encouraged continued compliance with HEP; pt voiced understanding. No adverse effects reported p tx.     Ruth Is progressing well towards her goals.   Pt prognosis is Good.      Pt will continue to benefit from skilled outpatient physical therapy to address the deficits listed in the problem list box on initial evaluation, provide pt/family education and to maximize pt's level of independence in the home and community environment.      Pt's spiritual, cultural and educational needs considered and pt agreeable to plan of care and goals.     Anticipated barriers to physical therapy: none    Previous Short Term Goals Status:   GOALS: Short Term Goals:  4 weeks  1.Report decreased knee pain < / = 2/10 to increase tolerance for work and running activities. - MET (8/2/2022)  2. Pt will demonstrate a (-) Slump test to demonstrate decreased neural mechanosensivity. - MET (8/2/2022)  3. Increase strength by 1/3 MMT grade in quad strength  to increase tolerance for ADL and work activities. - MET (8/2/2022)  4. Pt to tolerate HEP to improve ROM and independence with ADL's - MET (8/2/2022)    New Short Term Goals Status:  none  Long Term Goal Status:  Modified:    1. Pt " will be able to perform appropriate hip hinge pattern with sit<>stands with no cueing.   Reasons for Recertification of Therapy:   Continue to build strength and motor control to perform ip hinge appropriately.     Plan     Updated Certification Period: 8/26/2022 to 9/2/2022  Recommended Treatment Plan: 1 times per week for 4 weeks: Gait Training, Manual Therapy, Moist Heat/ Ice, Neuromuscular Re-ed, Patient Education, Self Care, Therapeutic Activities and Therapeutic Exercise  Other Recommendations: none    Charlotte Hernandze, PTA

## 2022-08-28 ENCOUNTER — OFFICE VISIT (OUTPATIENT)
Dept: URGENT CARE | Facility: CLINIC | Age: 52
End: 2022-08-28
Payer: COMMERCIAL

## 2022-08-28 ENCOUNTER — NURSE TRIAGE (OUTPATIENT)
Dept: ADMINISTRATIVE | Facility: CLINIC | Age: 52
End: 2022-08-28
Payer: COMMERCIAL

## 2022-08-28 VITALS
SYSTOLIC BLOOD PRESSURE: 156 MMHG | HEIGHT: 65 IN | WEIGHT: 175.06 LBS | HEART RATE: 86 BPM | RESPIRATION RATE: 16 BRPM | TEMPERATURE: 99 F | OXYGEN SATURATION: 98 % | BODY MASS INDEX: 29.17 KG/M2 | DIASTOLIC BLOOD PRESSURE: 89 MMHG

## 2022-08-28 DIAGNOSIS — H57.89 IRRITATION OF LEFT EYE: Primary | ICD-10-CM

## 2022-08-28 PROCEDURE — 3079F PR MOST RECENT DIASTOLIC BLOOD PRESSURE 80-89 MM HG: ICD-10-PCS | Mod: CPTII,S$GLB,, | Performed by: NURSE PRACTITIONER

## 2022-08-28 PROCEDURE — 1159F MED LIST DOCD IN RCRD: CPT | Mod: CPTII,S$GLB,, | Performed by: NURSE PRACTITIONER

## 2022-08-28 PROCEDURE — 3077F SYST BP >= 140 MM HG: CPT | Mod: CPTII,S$GLB,, | Performed by: NURSE PRACTITIONER

## 2022-08-28 PROCEDURE — 3008F BODY MASS INDEX DOCD: CPT | Mod: CPTII,S$GLB,, | Performed by: NURSE PRACTITIONER

## 2022-08-28 PROCEDURE — 4010F PR ACE/ARB THEARPY RXD/TAKEN: ICD-10-PCS | Mod: CPTII,S$GLB,, | Performed by: NURSE PRACTITIONER

## 2022-08-28 PROCEDURE — 3079F DIAST BP 80-89 MM HG: CPT | Mod: CPTII,S$GLB,, | Performed by: NURSE PRACTITIONER

## 2022-08-28 PROCEDURE — 3044F PR MOST RECENT HEMOGLOBIN A1C LEVEL <7.0%: ICD-10-PCS | Mod: CPTII,S$GLB,, | Performed by: NURSE PRACTITIONER

## 2022-08-28 PROCEDURE — 3008F PR BODY MASS INDEX (BMI) DOCUMENTED: ICD-10-PCS | Mod: CPTII,S$GLB,, | Performed by: NURSE PRACTITIONER

## 2022-08-28 PROCEDURE — 99213 PR OFFICE/OUTPT VISIT, EST, LEVL III, 20-29 MIN: ICD-10-PCS | Mod: S$GLB,,, | Performed by: NURSE PRACTITIONER

## 2022-08-28 PROCEDURE — 3044F HG A1C LEVEL LT 7.0%: CPT | Mod: CPTII,S$GLB,, | Performed by: NURSE PRACTITIONER

## 2022-08-28 PROCEDURE — 1159F PR MEDICATION LIST DOCUMENTED IN MEDICAL RECORD: ICD-10-PCS | Mod: CPTII,S$GLB,, | Performed by: NURSE PRACTITIONER

## 2022-08-28 PROCEDURE — 1160F RVW MEDS BY RX/DR IN RCRD: CPT | Mod: CPTII,S$GLB,, | Performed by: NURSE PRACTITIONER

## 2022-08-28 PROCEDURE — 99213 OFFICE O/P EST LOW 20 MIN: CPT | Mod: S$GLB,,, | Performed by: NURSE PRACTITIONER

## 2022-08-28 PROCEDURE — 1160F PR REVIEW ALL MEDS BY PRESCRIBER/CLIN PHARMACIST DOCUMENTED: ICD-10-PCS | Mod: CPTII,S$GLB,, | Performed by: NURSE PRACTITIONER

## 2022-08-28 PROCEDURE — 3077F PR MOST RECENT SYSTOLIC BLOOD PRESSURE >= 140 MM HG: ICD-10-PCS | Mod: CPTII,S$GLB,, | Performed by: NURSE PRACTITIONER

## 2022-08-28 PROCEDURE — 4010F ACE/ARB THERAPY RXD/TAKEN: CPT | Mod: CPTII,S$GLB,, | Performed by: NURSE PRACTITIONER

## 2022-08-28 RX ORDER — OFLOXACIN 3 MG/ML
1 SOLUTION/ DROPS OPHTHALMIC 4 TIMES DAILY
Qty: 10 ML | Refills: 0 | Status: SHIPPED | OUTPATIENT
Start: 2022-08-28

## 2022-08-28 NOTE — PROGRESS NOTES
"Subjective:       Patient ID: Ruth Harper is a 52 y.o. female.    Vitals:  height is 5' 5" (1.651 m) and weight is 79.4 kg (175 lb 0.7 oz). Her oral temperature is 98.7 °F (37.1 °C). Her blood pressure is 156/89 (abnormal) and her pulse is 86. Her respiration is 16 and oxygen saturation is 98%.     Chief Complaint: Eye Pain    53 y/o female presents to  today with c/o left eye pain that started 2 days ago. Pt denies known FB to eye, or any eye injury. Denies purulent drainage/crusty eyelids from drainage. She does note clear tears draining. Pt saw optometrist yesterday (at [facility]-the only place she cold find open on a Saturday), who assessed eye and did not note anything abnormal, per patient; except for possibly an irritated blood vessel under her eye lid. They suggested patient try alaway allergy eye drops, in which she did-and did not help. She is a contact-lens wearer. Denies tear to her contact lenses. Has disposed of contact lenses, and is wearing her eyeglasses now. Vision is not affected. Pain will not resolve.     Eye Pain   The left eye is affected. This is a new problem. The current episode started in the past 7 days. The problem occurs constantly. The problem has been gradually worsening. There was no injury mechanism. The pain is at a severity of 6/10. The pain is moderate. There is No known exposure to pink eye. She Wears contacts. Associated symptoms include an eye discharge and eye redness. She has tried eye drops for the symptoms. The treatment provided no relief.     Eyes:  Positive for eye discharge, eye pain and eye redness.     Objective:      Physical Exam   Constitutional: She is oriented to person, place, and time.  Non-toxic appearance. She does not appear ill. No distress.   HENT:   Head: Normocephalic.   Nose: Nose normal.   Mouth/Throat: Mucous membranes are moist. Oropharynx is clear.   Eyes: Lids are normal. Pupils are equal, round, and reactive to light. Lids are everted " and swept, no foreign bodies found. No visual field deficit is present. Right eye exhibits no chemosis, no discharge, no exudate and no hordeolum. Left eye exhibits no chemosis, no discharge and no hordeolum. No foreign body present in the left eye. Right conjunctiva is not injected. Right conjunctiva has no hemorrhage. Left conjunctiva is injected. Left conjunctiva has no hemorrhage. No scleral icterus. Right eye exhibits normal extraocular motion and no nystagmus. Left eye exhibits normal extraocular motion and no nystagmus. Extraocular movement intact vision grossly intact gaze aligned appropriately periorbital hyperpigmentation  Neck: No neck rigidity present.   Cardiovascular: Normal rate.   Pulmonary/Chest: Effort normal.   Abdominal: Normal appearance.   Musculoskeletal: Normal range of motion.         General: Normal range of motion.   Neurological: She is alert and oriented to person, place, and time.   Skin: Skin is not diaphoretic.   Psychiatric: Her behavior is normal. Mood normal.   Nursing note and vitals reviewed.      Assessment:       1. Irritation of left eye          Plan:       1gtt proparacaine and 1gtt fluorescein placed to left eye. Left upper eyelid everted to assess for FB. No FB noted.   Left globe assessed under woods lamp. No corneal abrasion noted.     Irritation of left eye  -     ofloxacin (OCUFLOX) 0.3 % ophthalmic solution; Place 1 drop into the left eye 4 (four) times daily.  Dispense: 10 mL; Refill: 0  -     Ambulatory referral/consult to Ophthalmology

## 2022-08-28 NOTE — TELEPHONE ENCOUNTER
Pt called in stating she has left eye pain- feels like something in eye. Saw optometrist yesterday and did not see anything in eye. States condition has persisted. Wanting to know if there is any place she can be evaluated today for possible scratch or FB or if she needs to wait to see eye doctor tomorrow. Pt advised she can be evaluated at ED or UC for that. Pt has no additional concerns or questions at this time. Advised to call back with concerns or worsening symptoms. Verbalized understanding.     Reason for Disposition   Health Information question, no triage required and triager able to answer question    Additional Information   Negative: RN needs further essential information from caller in order to complete triage   Negative: Requesting regular office appointment   Negative: [1] Caller requesting NON-URGENT health information AND [2] PCP's office is the best resource    Protocols used: Information Only Call - No Triage-A-

## 2022-08-31 ENCOUNTER — OFFICE VISIT (OUTPATIENT)
Dept: OPTOMETRY | Facility: CLINIC | Age: 52
End: 2022-08-31
Payer: COMMERCIAL

## 2022-08-31 DIAGNOSIS — Z97.3 STERILE KERATITIS ASSOCIATED WITH CONTACT LENS: Primary | ICD-10-CM

## 2022-08-31 DIAGNOSIS — H16.8 STERILE KERATITIS ASSOCIATED WITH CONTACT LENS: Primary | ICD-10-CM

## 2022-08-31 PROCEDURE — 4010F PR ACE/ARB THEARPY RXD/TAKEN: ICD-10-PCS | Mod: CPTII,S$GLB,, | Performed by: OPTOMETRIST

## 2022-08-31 PROCEDURE — 92004 PR EYE EXAM, NEW PATIENT,COMPREHESV: ICD-10-PCS | Mod: S$GLB,,, | Performed by: OPTOMETRIST

## 2022-08-31 PROCEDURE — 1159F PR MEDICATION LIST DOCUMENTED IN MEDICAL RECORD: ICD-10-PCS | Mod: CPTII,S$GLB,, | Performed by: OPTOMETRIST

## 2022-08-31 PROCEDURE — 92004 COMPRE OPH EXAM NEW PT 1/>: CPT | Mod: S$GLB,,, | Performed by: OPTOMETRIST

## 2022-08-31 PROCEDURE — 1159F MED LIST DOCD IN RCRD: CPT | Mod: CPTII,S$GLB,, | Performed by: OPTOMETRIST

## 2022-08-31 PROCEDURE — 99999 PR PBB SHADOW E&M-EST. PATIENT-LVL II: CPT | Mod: PBBFAC,,, | Performed by: OPTOMETRIST

## 2022-08-31 PROCEDURE — 3044F HG A1C LEVEL LT 7.0%: CPT | Mod: CPTII,S$GLB,, | Performed by: OPTOMETRIST

## 2022-08-31 PROCEDURE — 99999 PR PBB SHADOW E&M-EST. PATIENT-LVL II: ICD-10-PCS | Mod: PBBFAC,,, | Performed by: OPTOMETRIST

## 2022-08-31 PROCEDURE — 4010F ACE/ARB THERAPY RXD/TAKEN: CPT | Mod: CPTII,S$GLB,, | Performed by: OPTOMETRIST

## 2022-08-31 PROCEDURE — 3044F PR MOST RECENT HEMOGLOBIN A1C LEVEL <7.0%: ICD-10-PCS | Mod: CPTII,S$GLB,, | Performed by: OPTOMETRIST

## 2022-08-31 NOTE — PROGRESS NOTES
HPI    Urgent OS      Patient is here for Red Eye OS  Pt states irritation started Friday afternoon   Pt states she put her contact solution in her eyes because it start   irritating her OS  Pt states on Saturday, she saw an optometrist and she told her to put   Alaway drops for allergies OS  Pt states by Sunday the pain was bad and she went to ER and they gave her   antibiotic drops for OS  Pt states it feels better than Sunday but its still tearing in OS  Pt states feels like something is in OS    THERESA, Clint 2wk, Varies solution,  Last edited by Victor Manuel Jefferson, OD on 8/31/2022  2:14 PM.            Assessment /Plan     For exam results, see Encounter Report.    Sterile keratitis associated with contact lens  --CLs holiday x 1 wk  -Ocuflox QID OS x 1 wk      RTC PRN

## 2022-09-01 ENCOUNTER — TELEPHONE (OUTPATIENT)
Dept: OPTOMETRY | Facility: CLINIC | Age: 52
End: 2022-09-01
Payer: COMMERCIAL

## 2022-09-01 ENCOUNTER — TELEPHONE (OUTPATIENT)
Dept: OPHTHALMOLOGY | Facility: CLINIC | Age: 52
End: 2022-09-01
Payer: COMMERCIAL

## 2022-09-01 ENCOUNTER — TELEPHONE (OUTPATIENT)
Dept: INTERNAL MEDICINE | Facility: CLINIC | Age: 52
End: 2022-09-01
Payer: COMMERCIAL

## 2022-09-01 ENCOUNTER — OFFICE VISIT (OUTPATIENT)
Dept: URGENT CARE | Facility: CLINIC | Age: 52
End: 2022-09-01
Payer: COMMERCIAL

## 2022-09-01 VITALS
DIASTOLIC BLOOD PRESSURE: 92 MMHG | TEMPERATURE: 99 F | SYSTOLIC BLOOD PRESSURE: 149 MMHG | RESPIRATION RATE: 15 BRPM | HEART RATE: 96 BPM | HEIGHT: 65 IN | WEIGHT: 175 LBS | OXYGEN SATURATION: 98 % | BODY MASS INDEX: 29.16 KG/M2

## 2022-09-01 DIAGNOSIS — B02.8 HERPES ZOSTER WITH OTHER COMPLICATION: Primary | ICD-10-CM

## 2022-09-01 PROCEDURE — 3080F PR MOST RECENT DIASTOLIC BLOOD PRESSURE >= 90 MM HG: ICD-10-PCS | Mod: CPTII,S$GLB,, | Performed by: STUDENT IN AN ORGANIZED HEALTH CARE EDUCATION/TRAINING PROGRAM

## 2022-09-01 PROCEDURE — 3080F DIAST BP >= 90 MM HG: CPT | Mod: CPTII,S$GLB,, | Performed by: STUDENT IN AN ORGANIZED HEALTH CARE EDUCATION/TRAINING PROGRAM

## 2022-09-01 PROCEDURE — 3077F SYST BP >= 140 MM HG: CPT | Mod: CPTII,S$GLB,, | Performed by: STUDENT IN AN ORGANIZED HEALTH CARE EDUCATION/TRAINING PROGRAM

## 2022-09-01 PROCEDURE — 99214 PR OFFICE/OUTPT VISIT, EST, LEVL IV, 30-39 MIN: ICD-10-PCS | Mod: S$GLB,,, | Performed by: STUDENT IN AN ORGANIZED HEALTH CARE EDUCATION/TRAINING PROGRAM

## 2022-09-01 PROCEDURE — 3044F PR MOST RECENT HEMOGLOBIN A1C LEVEL <7.0%: ICD-10-PCS | Mod: CPTII,S$GLB,, | Performed by: STUDENT IN AN ORGANIZED HEALTH CARE EDUCATION/TRAINING PROGRAM

## 2022-09-01 PROCEDURE — 4010F ACE/ARB THERAPY RXD/TAKEN: CPT | Mod: CPTII,S$GLB,, | Performed by: STUDENT IN AN ORGANIZED HEALTH CARE EDUCATION/TRAINING PROGRAM

## 2022-09-01 PROCEDURE — 3044F HG A1C LEVEL LT 7.0%: CPT | Mod: CPTII,S$GLB,, | Performed by: STUDENT IN AN ORGANIZED HEALTH CARE EDUCATION/TRAINING PROGRAM

## 2022-09-01 PROCEDURE — 3008F BODY MASS INDEX DOCD: CPT | Mod: CPTII,S$GLB,, | Performed by: STUDENT IN AN ORGANIZED HEALTH CARE EDUCATION/TRAINING PROGRAM

## 2022-09-01 PROCEDURE — 99214 OFFICE O/P EST MOD 30 MIN: CPT | Mod: S$GLB,,, | Performed by: STUDENT IN AN ORGANIZED HEALTH CARE EDUCATION/TRAINING PROGRAM

## 2022-09-01 PROCEDURE — 3008F PR BODY MASS INDEX (BMI) DOCUMENTED: ICD-10-PCS | Mod: CPTII,S$GLB,, | Performed by: STUDENT IN AN ORGANIZED HEALTH CARE EDUCATION/TRAINING PROGRAM

## 2022-09-01 PROCEDURE — 3077F PR MOST RECENT SYSTOLIC BLOOD PRESSURE >= 140 MM HG: ICD-10-PCS | Mod: CPTII,S$GLB,, | Performed by: STUDENT IN AN ORGANIZED HEALTH CARE EDUCATION/TRAINING PROGRAM

## 2022-09-01 PROCEDURE — 4010F PR ACE/ARB THEARPY RXD/TAKEN: ICD-10-PCS | Mod: CPTII,S$GLB,, | Performed by: STUDENT IN AN ORGANIZED HEALTH CARE EDUCATION/TRAINING PROGRAM

## 2022-09-01 RX ORDER — VALACYCLOVIR HYDROCHLORIDE 1 G/1
1000 TABLET, FILM COATED ORAL EVERY 8 HOURS
Qty: 21 TABLET | Refills: 0 | Status: SHIPPED | OUTPATIENT
Start: 2022-09-01 | End: 2022-09-06 | Stop reason: SDUPTHER

## 2022-09-01 NOTE — PROGRESS NOTES
"Subjective:       Patient ID: Ruth Harper is a 52 y.o. female.    Vitals:  height is 5' 5" (1.651 m) and weight is 79.4 kg (175 lb). Her oral temperature is 99.3 °F (37.4 °C). Her blood pressure is 149/92 (abnormal) and her pulse is 96. Her respiration is 15 and oxygen saturation is 98%.     Chief Complaint: Herpes Zoster    This is a 52 y.o. female who presents today with a chief complaint of possible shingles. Patient has a rash on the right side of forehead. Patient was here this past Sunday for a possible eye infection      Rash  This is a new problem. The current episode started in the past 7 days. The affected locations include the face. The rash is characterized by redness. She was exposed to nothing. Past treatments include nothing. The treatment provided no relief.     Skin:  Positive for rash.     Objective:      Physical Exam   Constitutional: She does not appear ill. No distress.   Neurological: She is alert.   Skin: Skin is warm and dry.         Comments: Erythematous plaques bilateral forehead and midline of scalp, extending toward left eye. Flaking, few vesicles.   Nursing note and vitals reviewed.      Assessment:       1. Herpes zoster with other complication          Plan:         Herpes zoster with other complication  -     valACYclovir (VALTREX) 1000 MG tablet; Take 1 tablet (1,000 mg total) by mouth every 8 (eight) hours. for 7 days  Dispense: 21 tablet; Refill: 0       F/u with ophthalmology and PCP            "

## 2022-09-01 NOTE — TELEPHONE ENCOUNTER
I spoke with Dr Jefferson, and showed him the photo pt sent to Dr Harris. Dr Jefferson feels she needs to start on anti virals. Pt. Is going to [facility] now.

## 2022-09-01 NOTE — TELEPHONE ENCOUNTER
----- Message from Bailey Mcneal sent at 9/1/2022 10:09 AM CDT -----  Contact: [phone] Patient      Patient is calling for Medical Advice regarding: blistery rash on forehead, eye infection, had covid 2 weeks ago. Pt wants to make sure she does not have shingles. Please call and advise    How long has patient had these symptoms: 08/30    Pharmacy name and phone#:  [id number] - [place] - [address] [facility]  [place] [address]  Phone: [phone] Fax: [phone]        Would like response via Kanshu:  Call Back or portal message    Comments:

## 2022-09-01 NOTE — TELEPHONE ENCOUNTER
----- Message from Maximiliano Hale MA sent at 9/1/2022  3:44 PM CDT -----  Regarding: FW: Requesting Call back    ----- Message -----  From: Melany Mckeon  Sent: 9/1/2022   8:11 AM CDT  To: Li Rush Staff  Subject: Requesting Call back                             Pt called about her eye that Dr. Jefferson removed cells from being red and swollen and if this is normal?    Pts call back: [phone]

## 2022-09-02 ENCOUNTER — TELEPHONE (OUTPATIENT)
Dept: OPTOMETRY | Facility: CLINIC | Age: 52
End: 2022-09-02
Payer: COMMERCIAL

## 2022-09-02 ENCOUNTER — PATIENT MESSAGE (OUTPATIENT)
Dept: OPTOMETRY | Facility: CLINIC | Age: 52
End: 2022-09-02
Payer: COMMERCIAL

## 2022-09-02 DIAGNOSIS — B02.30 HERPES ZOSTER OPHTHALMICUS OF LEFT EYE: Primary | ICD-10-CM

## 2022-09-02 RX ORDER — ONDANSETRON 4 MG/1
4 TABLET, ORALLY DISINTEGRATING ORAL EVERY 8 HOURS PRN
Qty: 30 TABLET | Refills: 0 | Status: SHIPPED | OUTPATIENT
Start: 2022-09-02

## 2022-09-02 RX ORDER — PREDNISOLONE ACETATE 10 MG/ML
1 SUSPENSION/ DROPS OPHTHALMIC 4 TIMES DAILY
Qty: 15 ML | Refills: 0 | Status: SHIPPED | OUTPATIENT
Start: 2022-09-02 | End: 2023-09-02

## 2022-09-02 RX ORDER — SULFAMETHOXAZOLE AND TRIMETHOPRIM 800; 160 MG/1; MG/1
1 TABLET ORAL 2 TIMES DAILY
Qty: 14 TABLET | Refills: 0 | Status: SHIPPED | OUTPATIENT
Start: 2022-09-02 | End: 2022-09-09

## 2022-09-02 NOTE — TELEPHONE ENCOUNTER
Called patient.  She reports she went to urgent care and was prescribed Valtrex for shingles.  She reports her eye is swollen shut, but she has been seen by her eye doctor.  The patient reports low-grade fever and nausea as well.  Prescription for Zofran has been sent to the pharmacy electronically.  Also, prescription for Bactrim has been sent.  Patient instructed to take Bactrim if it appears she is developing an infection on top of the shingles.

## 2022-09-06 ENCOUNTER — OFFICE VISIT (OUTPATIENT)
Dept: OPHTHALMOLOGY | Facility: CLINIC | Age: 52
End: 2022-09-06
Payer: COMMERCIAL

## 2022-09-06 DIAGNOSIS — B02.8 HERPES ZOSTER WITH OTHER COMPLICATION: ICD-10-CM

## 2022-09-06 PROCEDURE — 3044F PR MOST RECENT HEMOGLOBIN A1C LEVEL <7.0%: ICD-10-PCS | Mod: CPTII,S$GLB,, | Performed by: OPHTHALMOLOGY

## 2022-09-06 PROCEDURE — 99999 PR PBB SHADOW E&M-EST. PATIENT-LVL III: ICD-10-PCS | Mod: PBBFAC,,, | Performed by: OPHTHALMOLOGY

## 2022-09-06 PROCEDURE — 4010F PR ACE/ARB THEARPY RXD/TAKEN: ICD-10-PCS | Mod: CPTII,S$GLB,, | Performed by: OPHTHALMOLOGY

## 2022-09-06 PROCEDURE — 3044F HG A1C LEVEL LT 7.0%: CPT | Mod: CPTII,S$GLB,, | Performed by: OPHTHALMOLOGY

## 2022-09-06 PROCEDURE — 1159F PR MEDICATION LIST DOCUMENTED IN MEDICAL RECORD: ICD-10-PCS | Mod: CPTII,S$GLB,, | Performed by: OPHTHALMOLOGY

## 2022-09-06 PROCEDURE — 99204 OFFICE O/P NEW MOD 45 MIN: CPT | Mod: S$GLB,,, | Performed by: OPHTHALMOLOGY

## 2022-09-06 PROCEDURE — 99999 PR PBB SHADOW E&M-EST. PATIENT-LVL III: CPT | Mod: PBBFAC,,, | Performed by: OPHTHALMOLOGY

## 2022-09-06 PROCEDURE — 99204 PR OFFICE/OUTPT VISIT, NEW, LEVL IV, 45-59 MIN: ICD-10-PCS | Mod: S$GLB,,, | Performed by: OPHTHALMOLOGY

## 2022-09-06 PROCEDURE — 1159F MED LIST DOCD IN RCRD: CPT | Mod: CPTII,S$GLB,, | Performed by: OPHTHALMOLOGY

## 2022-09-06 PROCEDURE — 4010F ACE/ARB THERAPY RXD/TAKEN: CPT | Mod: CPTII,S$GLB,, | Performed by: OPHTHALMOLOGY

## 2022-09-06 RX ORDER — VALACYCLOVIR HYDROCHLORIDE 1 G/1
1000 TABLET, FILM COATED ORAL EVERY 8 HOURS
Qty: 21 TABLET | Refills: 0 | Status: SHIPPED | OUTPATIENT
Start: 2022-09-06 | End: 2022-09-13

## 2022-09-06 NOTE — PROGRESS NOTES
HPI    Dr. Jefferson    Zoster right v1    Drops: Ocuflox QID  Pred Forte QID    Patient presents today for a Cornea Evaluation. Patient notes vision as   blurry. Patient notes no eye pain.         Last edited by Edie Mcneill MD on 9/6/2022  3:47 PM.            Assessment /Plan     For exam results, see Encounter Report.    Herpes zoster with other complication  -     valACYclovir (VALTREX) 1000 MG tablet; Take 1 tablet (1,000 mg total) by mouth every 8 (eight) hours. for 7 days  Dispense: 21 tablet; Refill: 0      Zoster right v1  - improving on current tx course    Stop ocuflox    Decr PF to TID    Finish 10 day course of valtrex.    F/up 1 wk - va/IOP OD

## 2022-09-08 RX ORDER — ERGOCALCIFEROL 1.25 MG/1
CAPSULE ORAL
Qty: 12 CAPSULE | Refills: 0 | Status: SHIPPED | OUTPATIENT
Start: 2022-09-08 | End: 2022-12-06

## 2022-09-14 ENCOUNTER — TELEPHONE (OUTPATIENT)
Dept: OPHTHALMOLOGY | Facility: CLINIC | Age: 52
End: 2022-09-14
Payer: COMMERCIAL

## 2022-09-14 NOTE — TELEPHONE ENCOUNTER
Left voicemail- explained to her to continue to use PF TID until her appointment with Dr. Mcneill.    ----- Message from Edie Mcneill MD sent at 9/14/2022  7:50 AM CDT -----  Regarding: RE: Call Back  Contact: Ruth Harper  Yes pls cont PF TID until I see her again.    ----- Message -----  From: Jaspreet Melissa  Sent: 9/13/2022   4:31 PM CDT  To: Edie Mcneill MD  Subject: FW: Call Back                                    Please advise  ----- Message -----  From: Cristiane Tatum  Sent: 9/12/2022  11:16 AM CDT  To: Charito MARTEL Staff  Subject: Call Back                                        Patient stated she cannot make her appt on 9/16 and needed to reschedule for 10/7. Patient is wanting to know if she should still take her Prednisolone 10% eye drops until then. Ruth Harper call back number is [phone]

## 2022-09-18 ENCOUNTER — PATIENT MESSAGE (OUTPATIENT)
Dept: INTERNAL MEDICINE | Facility: CLINIC | Age: 52
End: 2022-09-18
Payer: COMMERCIAL

## 2022-09-18 ENCOUNTER — PATIENT MESSAGE (OUTPATIENT)
Dept: SPORTS MEDICINE | Facility: CLINIC | Age: 52
End: 2022-09-18
Payer: COMMERCIAL

## 2022-09-20 ENCOUNTER — PATIENT MESSAGE (OUTPATIENT)
Dept: OPTOMETRY | Facility: CLINIC | Age: 52
End: 2022-09-20
Payer: COMMERCIAL

## 2022-09-23 ENCOUNTER — TELEPHONE (OUTPATIENT)
Dept: INTERNAL MEDICINE | Facility: CLINIC | Age: 52
End: 2022-09-23
Payer: COMMERCIAL

## 2022-09-23 NOTE — TELEPHONE ENCOUNTER
Patient is calling for Medical Advice regarding:pt stated she is still having burning and  itching from the shingles. Pt is wondering should she still have these symptoms after 3 weeks?  Please advise .

## 2022-09-23 NOTE — TELEPHONE ENCOUNTER
Called and spoke to pt , asked if she was still taking gabapentin pt says that she stopped back but will start back taking 3 times a day as recommended . Informed that if this does not work to let office know so a possible neurology referral can be placed . Pt verbalized understanding and will do so.

## 2022-09-23 NOTE — TELEPHONE ENCOUNTER
----- Message from Bailey Mcneal sent at 9/23/2022 10:12 AM CDT -----  Contact: [phone] Patient  2nd Attempt. Previous message on 09/18.      Patient is calling for Medical Advice regarding:pt stated she is still have burning and  itching from the shingles. Pt is wondering should she still have these symptoms after 3 weeks?  Please call and advise    How long has patient had these symptoms: 3 weeks    Pharmacy name and phone#:  [facility] #[id number] - [place]  [address] AT [facility] [address] [place] [address]  Phone: [phone] Fax: [phone]        Would like response via ufindadst: Call Back please and advise    Comments:

## 2022-09-23 NOTE — TELEPHONE ENCOUNTER
----- Message from Bailey Mcneal sent at 9/23/2022 10:12 AM CDT -----  Contact: [phone] Patient  2nd Attempt. Previous message on 09/18.      Patient is calling for Medical Advice regarding:pt stated she is still have burning and  itching from the shingles. Pt is wondering should she still have these symptoms after 3 weeks?  Please call and advise    How long has patient had these symptoms: 3 weeks    Pharmacy name and phone#:  [facility] #[id number] - [place]  [address] AT [facility] [address] [place] [address]  Phone: [phone] Fax: [phone]        Would like response via [employer] Communication[employer]: Call Back please and advise    Comments:

## 2022-09-23 NOTE — TELEPHONE ENCOUNTER
Please inform patient that you can have prolonged symptoms with shingles.  Please advise if patient is still taking gabapentin.  If she is, I recommend she increase gabapentin to 3 times daily.  Will consider neurology referral if symptoms fail to improve.

## 2022-10-07 ENCOUNTER — CLINICAL SUPPORT (OUTPATIENT)
Dept: REHABILITATION | Facility: HOSPITAL | Age: 52
End: 2022-10-07
Payer: COMMERCIAL

## 2022-10-07 ENCOUNTER — OFFICE VISIT (OUTPATIENT)
Dept: OPHTHALMOLOGY | Facility: CLINIC | Age: 52
End: 2022-10-07
Payer: COMMERCIAL

## 2022-10-07 ENCOUNTER — TELEPHONE (OUTPATIENT)
Dept: OPHTHALMOLOGY | Facility: CLINIC | Age: 52
End: 2022-10-07
Payer: COMMERCIAL

## 2022-10-07 DIAGNOSIS — B02.8 HERPES ZOSTER WITH OTHER COMPLICATION: Primary | ICD-10-CM

## 2022-10-07 DIAGNOSIS — M25.652 DECREASED RANGE OF LEFT HIP MOVEMENT: ICD-10-CM

## 2022-10-07 DIAGNOSIS — R29.3 POSTURE ABNORMALITY: ICD-10-CM

## 2022-10-07 DIAGNOSIS — R53.1 DECREASED STRENGTH: Primary | ICD-10-CM

## 2022-10-07 PROCEDURE — 99214 PR OFFICE/OUTPT VISIT, EST, LEVL IV, 30-39 MIN: ICD-10-PCS | Mod: S$GLB,,, | Performed by: OPHTHALMOLOGY

## 2022-10-07 PROCEDURE — 1159F PR MEDICATION LIST DOCUMENTED IN MEDICAL RECORD: ICD-10-PCS | Mod: CPTII,S$GLB,, | Performed by: OPHTHALMOLOGY

## 2022-10-07 PROCEDURE — 3044F PR MOST RECENT HEMOGLOBIN A1C LEVEL <7.0%: ICD-10-PCS | Mod: CPTII,S$GLB,, | Performed by: OPHTHALMOLOGY

## 2022-10-07 PROCEDURE — 4010F PR ACE/ARB THEARPY RXD/TAKEN: ICD-10-PCS | Mod: CPTII,S$GLB,, | Performed by: OPHTHALMOLOGY

## 2022-10-07 PROCEDURE — 97112 NEUROMUSCULAR REEDUCATION: CPT | Performed by: PHYSICAL THERAPIST

## 2022-10-07 PROCEDURE — 3044F HG A1C LEVEL LT 7.0%: CPT | Mod: CPTII,S$GLB,, | Performed by: OPHTHALMOLOGY

## 2022-10-07 PROCEDURE — 99214 OFFICE O/P EST MOD 30 MIN: CPT | Mod: S$GLB,,, | Performed by: OPHTHALMOLOGY

## 2022-10-07 PROCEDURE — 99999 PR PBB SHADOW E&M-EST. PATIENT-LVL III: ICD-10-PCS | Mod: PBBFAC,,, | Performed by: OPHTHALMOLOGY

## 2022-10-07 PROCEDURE — 4010F ACE/ARB THERAPY RXD/TAKEN: CPT | Mod: CPTII,S$GLB,, | Performed by: OPHTHALMOLOGY

## 2022-10-07 PROCEDURE — 97140 MANUAL THERAPY 1/> REGIONS: CPT | Performed by: PHYSICAL THERAPIST

## 2022-10-07 PROCEDURE — 1159F MED LIST DOCD IN RCRD: CPT | Mod: CPTII,S$GLB,, | Performed by: OPHTHALMOLOGY

## 2022-10-07 PROCEDURE — 99999 PR PBB SHADOW E&M-EST. PATIENT-LVL III: CPT | Mod: PBBFAC,,, | Performed by: OPHTHALMOLOGY

## 2022-10-07 NOTE — PLAN OF CARE
"  Outpatient Therapy Updated Plan of Care     Visit Date: 10/7/2022  Name: Ruth Harper  Clinic Number: [id number]    Therapy Diagnosis:   Encounter Diagnoses   Name Primary?    Decreased strength Yes    Posture abnormality     Decreased range of left hip movement      Physician: Tunde Venegas,*     Physician Orders: PT Eval and Treat   Medical Diagnosis from Referral: M70.72 (ICD-10-CM) - Ischial bursitis of left side  Evaluation Date: 6/6/2022  Authorization Period Expiration:12/31/2022  New Plan of Care Expiration: 3/7/2022  Visit # / Visits authorized: 18/24     Time In: 1000  Time Out: 1100  Total Billable Time:  30 minutes     Precautions: Standard      Initial FOTO: 33% (20% predicted)   FOTO 1st F/U: 33% (6/29/22)   FOTO 2nd F/U:     Subjective      Pt reports: I was doing really well, felt great after therapy and would run all of my errands. Then I got covid and shingles back to back, fell off my exercises and now the nerve pain comes and goes down my back again     She was compliant with home exercise program.  Response to previous treatment: n/a  Functional change: ongoing     Pain: 0/10  Location: left buttocks      Objective     Update:   Assessment: (10/7)   Slump: (+) L LE with knee ext    SLR (-)   Lumbar AROM: full and pain-free   Hip Hinge: fair; requires cueing to perform correctly     Hip Abd/Ext: 4-/5 B    Hip PGM: 3/5 B     Ruth received therapeutic exercises to develop strength, endurance and ROM for 19 minutes including:    Open books 2 x 15 B   Bridges YTB 20 x 5"  S/L clams GTB 2 x 10" ally  Supine sciatic nerve sliders 2 x 15    NP:  Supine Thoracic Ext with half foam along back x 20 with exhale at end range of BUE flexion  Child's pose + Thoracic Ext/Rot 2 x 15 B   Standing hip flexor stretch with PPT 3 x 30" bilat    Education - HEP/activity modification     Ruth received the following manual therapy techniques: Joint mobilizations were applied to the: hip/thoracic for 25 " "minutes, including:     Lumbar reassessment as above  Prone CT junction gapping mobs, grade 3/4   Prone thoracic manipulation - pt agreeable   Lumbar L4-S1 Gr III gapping  Sidelying breaking bread    Ruth participated in neuromuscular re-education activities to improve: Coordination, Kinesthetic, Sense and Proprioception for 16 minutes. The following activities were included:    Bird dog LE only 2 x 10 B   Prone on elbows press up 2 x 10    NP:  PPT 10 x 10"  TA marching 2 x 10  TA BKFO 2 x 10  Standing Paloff press in staggered stance MTB 2 x 10 x 5" ally  Hip Hinging c dowel x 20  20in DL squat s dowel 2 x 10  Iso shldr ext hold c slow marching OTB 2 x 10  Sit<>Stands with Hip Hinge 3 x 8   PPT c OH handcuffs YTB 3 x 10  PPT c iso pallof hold x 3 rounds ally  Lateral walks c neutral spine YTB x 2 turf laps       Assessment     Ruth presented back to therapy following increase in symptoms with covid and shingles. She was + for slump testing on the L with knee extension. Improved following thoracic mobility work. Needs to continue low abdominal stabilization to prevent recurrence of pain. Lacks stabilization as seen with hip sway performing bird dog LE only    Ruth Is progressing well towards her goals.   Pt prognosis is Good.      Pt will continue to benefit from skilled outpatient physical therapy to address the deficits listed in the problem list box on initial evaluation, provide pt/family education and to maximize pt's level of independence in the home and community environment.      Pt's spiritual, cultural and educational needs considered and pt agreeable to plan of care and goals.     Anticipated barriers to physical therapy: none    Previous Short Term Goals Status:   GOALS: Short Term Goals:  4 weeks  1.Report decreased knee pain < / = 2/10 to increase tolerance for work and running activities. - MET (8/2/2022)  2. Pt will demonstrate a (-) Slump test to demonstrate decreased neural " mechanosensivity. -   3. Increase strength by 1/3 MMT grade in quad strength  to increase tolerance for ADL and work activities. - MET (8/2/2022)  4. 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  Patient to improve glute med strength to 4/5 bilaterally    Plan     Certification Period: 10/7/2022 to 3/7/2022  Recommended Treatment Plan: 2 times per week for 12 weeks: Cervical/Lumbar Traction, Gait Training, Manual Therapy, Moist Heat/ Ice, Neuromuscular Re-ed, Patient Education, Self Care, Therapeutic Activities, Therapeutic Exercise, and   Other Recommendations: modalities prn, ASTYM prn, Dry Needling prn    Therapist: Jean-Paul Rodney, PT, DPT    I CERTIFY THE NEED FOR THESE SERVICES FURNISHED UNDER THIS PLAN OF TREATMENT AND WHILE UNDER MY CARE    Physician's comments: ________________________________________________________________________________________________________________________________________________      Physician's Name: ___________________________________

## 2022-10-07 NOTE — PROGRESS NOTES
"  Outpatient Therapy Updated Plan of Care     Visit Date: 10/7/2022  Name: Ruth Harper  Clinic Number: [id number]    Therapy Diagnosis:   Encounter Diagnoses   Name Primary?    Decreased strength Yes    Posture abnormality     Decreased range of left hip movement      Physician: Tunde Venegas,*     Physician Orders: PT Eval and Treat   Medical Diagnosis from Referral: M70.72 (ICD-10-CM) - Ischial bursitis of left side  Evaluation Date: 6/6/2022  Authorization Period Expiration:12/31/2022  New Plan of Care Expiration: 3/7/2022  Visit # / Visits authorized: 18/24     Time In: 1000  Time Out: 1100  Total Billable Time:  30 minutes     Precautions: Standard      Initial FOTO: 33% (20% predicted)   FOTO 1st F/U: 33% (6/29/22)   FOTO 2nd F/U:     Subjective      Pt reports: I was doing really well, felt great after therapy and would run all of my errands. Then I got covid and shingles back to back, fell off my exercises and now the nerve pain comes and goes down my back again     She was compliant with home exercise program.  Response to previous treatment: n/a  Functional change: ongoing     Pain: 0/10  Location: left buttocks      Objective     Update:   Assessment: (10/7)   Slump: (+) L LE with knee ext    SLR (-)   Lumbar AROM: full and pain-free   Hip Hinge: fair; requires cueing to perform correctly     Hip Abd/Ext: 4-/5 B    Hip PGM: 3/5 B     Ruth received therapeutic exercises to develop strength, endurance and ROM for 19 minutes including:    Open books 2 x 15 B   Bridges YTB 20 x 5"  S/L clams GTB 2 x 10" ally  Supine sciatic nerve sliders 2 x 15    NP:  Supine Thoracic Ext with half foam along back x 20 with exhale at end range of BUE flexion  Child's pose + Thoracic Ext/Rot 2 x 15 B   Standing hip flexor stretch with PPT 3 x 30" bilat    Education - HEP/activity modification     Ruth received the following manual therapy techniques: Joint mobilizations were applied to the: hip/thoracic for 25 " "minutes, including:     Lumbar reassessment as above  Prone CT junction gapping mobs, grade 3/4   Prone thoracic manipulation - pt agreeable   Lumbar L4-S1 Gr III gapping  Sidelying breaking bread    Ruth participated in neuromuscular re-education activities to improve: Coordination, Kinesthetic, Sense and Proprioception for 16 minutes. The following activities were included:    Bird dog LE only 2 x 10 B   Prone on elbows press up 2 x 10    NP:  PPT 10 x 10"  TA marching 2 x 10  TA BKFO 2 x 10  Standing Paloff press in staggered stance MTB 2 x 10 x 5" ally  Hip Hinging c dowel x 20  20in DL squat s dowel 2 x 10  Iso shldr ext hold c slow marching OTB 2 x 10  Sit<>Stands with Hip Hinge 3 x 8   PPT c OH handcuffs YTB 3 x 10  PPT c iso pallof hold x 3 rounds ally  Lateral walks c neutral spine YTB x 2 turf laps       Assessment     Ruth presented back to therapy following increase in symptoms with covid and shingles. She was + for slump testing on the L with knee extension. Improved following thoracic mobility work. Needs to continue low abdominal stabilization to prevent recurrence of pain. Lacks stabilization as seen with hip sway performing bird dog LE only    Ruth Is progressing well towards her goals.   Pt prognosis is Good.      Pt will continue to benefit from skilled outpatient physical therapy to address the deficits listed in the problem list box on initial evaluation, provide pt/family education and to maximize pt's level of independence in the home and community environment.      Pt's spiritual, cultural and educational needs considered and pt agreeable to plan of care and goals.     Anticipated barriers to physical therapy: none    Previous Short Term Goals Status:   GOALS: Short Term Goals:  4 weeks  1.Report decreased knee pain < / = 2/10 to increase tolerance for work and running activities. - MET (8/2/2022)  2. Pt will demonstrate a (-) Slump test to demonstrate decreased neural " mechanosensivity. -   3. Increase strength by 1/3 MMT grade in quad strength  to increase tolerance for ADL and work activities. - MET (8/2/2022)  4. 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  Patient to improve glute med strength to 4/5 bilaterally    Plan     Certification Period: 10/7/2022 to 3/7/2022  Recommended Treatment Plan: 2 times per week for 12 weeks: Cervical/Lumbar Traction, Gait Training, Manual Therapy, Moist Heat/ Ice, Neuromuscular Re-ed, Patient Education, Self Care, Therapeutic Activities, Therapeutic Exercise, and    Other Recommendations: modalities prn, ASTYM prn, Dry Needling prn    Therapist: Jean-Paul Rodney, PT, DPT    I CERTIFY THE NEED FOR THESE SERVICES FURNISHED UNDER THIS PLAN OF TREATMENT AND WHILE UNDER MY CARE    Physician's comments: ________________________________________________________________________________________________________________________________________________      Physician's Name: ___________________________________

## 2022-10-07 NOTE — PROGRESS NOTES
HPI    Dr. Jefferson    Herpes zoster left v1    Pred Forte qd OS    52 y.o. female is here for 4 week F/U for Herpes zoster with other ocular   complication. Unable to make 1 week F/U, Ruth states that she had   surgery.Denies eye pain and f/f. No discomfort. No blurred vision, left   eye.     Last edited by Edie Mcneill MD on 10/7/2022  4:36 PM.            Assessment /Plan     For exam results, see Encounter Report.    Herpes zoster with other complication    Cornea clear, okay to cont PF for 3-4 more months, f/up Dr. DAVID.  If still quiet, can transition to FML or mild steroid daily.  Then can try QOD.

## 2022-10-10 ENCOUNTER — PATIENT MESSAGE (OUTPATIENT)
Dept: INTERNAL MEDICINE | Facility: CLINIC | Age: 52
End: 2022-10-10
Payer: COMMERCIAL

## 2022-10-11 ENCOUNTER — CLINICAL SUPPORT (OUTPATIENT)
Dept: REHABILITATION | Facility: HOSPITAL | Age: 52
End: 2022-10-11
Payer: COMMERCIAL

## 2022-10-11 DIAGNOSIS — M25.652 DECREASED RANGE OF LEFT HIP MOVEMENT: ICD-10-CM

## 2022-10-11 DIAGNOSIS — R29.3 POSTURE ABNORMALITY: ICD-10-CM

## 2022-10-11 DIAGNOSIS — R53.1 DECREASED STRENGTH: Primary | ICD-10-CM

## 2022-10-11 PROCEDURE — 97140 MANUAL THERAPY 1/> REGIONS: CPT | Mod: CQ

## 2022-10-11 PROCEDURE — 97110 THERAPEUTIC EXERCISES: CPT | Mod: CQ

## 2022-10-11 PROCEDURE — 97112 NEUROMUSCULAR REEDUCATION: CPT | Mod: CQ

## 2022-10-11 NOTE — PROGRESS NOTES
"  Outpatient Therapy Updated Plan of Care     Visit Date: 10/11/2022  Name: Ruth Harper  Clinic Number: [id number]    Therapy Diagnosis:   Encounter Diagnoses   Name Primary?    Decreased strength Yes    Posture abnormality     Decreased range of left hip movement      Physician: Tunde Venegas,*     Physician Orders: PT Eval and Treat   Medical Diagnosis from Referral: M70.72 (ICD-10-CM) - Ischial bursitis of left side  Evaluation Date: 6/6/2022  Authorization Period Expiration:12/31/2022  New Plan of Care Expiration: 3/7/2022  Visit # / Visits authorized: 19/24     Time In: 1603  Time Out: 1705  Total Billable Time:  56 minutes     Precautions: Standard      Initial FOTO: 33% (20% predicted)   FOTO 1st F/U: 33% (6/29/22)   FOTO 2nd F/U:     Subjective      Pt reports: continued soreness in L glute. Curious what exercises she needs to be performing at home.      She was compliant with home exercise program.  Response to previous treatment: n/a  Functional change: ongoing     Pain: 0/10  Location: left buttocks      Objective     Update:   Assessment: (10/7)   Slump: (+) L LE with knee ext    SLR (-)   Lumbar AROM: full and pain-free   Hip Hinge: fair; requires cueing to perform correctly     Hip Abd/Ext: 4-/5 B    Hip PGM: 3/5 B     Ruth received therapeutic exercises to develop strength, endurance and ROM for 17 minutes including:    Open books 2 x 15 B   Bridges YTB 20 x 5"  S/L clams GTB 2 x 10 x 10" ally    NP:  Supine sciatic nerve sliders 2 x 15  Supine Thoracic Ext with half foam along back x 20 with exhale at end range of BUE flexion  Child's pose + Thoracic Ext/Rot 2 x 15 B   Standing hip flexor stretch with PPT 3 x 30" bilat    Education - HEP/activity modification     Ruth received the following manual therapy techniques: Joint mobilizations were applied to the: hip/thoracic for 16 minutes, including:     Lumbar reassessment as above  Prone CT junction gapping mobs, grade 3/4   Prone " "thoracic manipulation - pt agreeable   Lumbar L4-S1 Gr III gapping  Sidelying breaking bread    Ruth participated in neuromuscular re-education activities to improve: Coordination, Kinesthetic, Sense and Proprioception for 23 minutes. The following activities were included:    TA activation 10 x 10"  TA marching 2 x 10  TA BKFO 2 x 10  Bird dog LE only 2 x 10 B   Prone on elbows press up 2 x 10    NP:  PPT 10 x 10"  Standing Paloff press in staggered stance MTB 2 x 10 x 5" ally  Hip Hinging c dowel x 20  20in DL squat s dowel 2 x 10  Iso shldr ext hold c slow marching OTB 2 x 10  Sit<>Stands with Hip Hinge 3 x 8   PPT c OH handcuffs YTB 3 x 10  PPT c iso pallof hold x 3 rounds ally  Lateral walks c neutral spine YTB x 2 turf laps       Assessment     Ruth did well today. She reports relief after manual therapy per supervising DPT. Poor-fair TA activation with lumbar rotation noted during TA marching and LE bird dogs. Updated HEP and issued printout. No adverse effects reported p tx.     Ruth Is progressing well towards her goals.   Pt prognosis is Good.      Pt will continue to benefit from skilled outpatient physical therapy to address the deficits listed in the problem list box on initial evaluation, provide pt/family education and to maximize pt's level of independence in the home and community environment.      Pt's spiritual, cultural and educational needs considered and pt agreeable to plan of care and goals.     Anticipated barriers to physical therapy: none    Previous Short Term Goals Status:   GOALS: Short Term Goals:  4 weeks  1.Report decreased knee pain < / = 2/10 to increase tolerance for work and running activities. - MET (8/2/2022)  2. Pt will demonstrate a (-) Slump test to demonstrate decreased neural mechanosensivity. -   3. Increase strength by 1/3 MMT grade in quad strength  to increase tolerance for ADL and work activities. - MET (8/2/2022)  4. 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

## 2022-10-11 NOTE — TELEPHONE ENCOUNTER
Pt started having burning and itching from shingles on 09/02/2022. Pt asking how long should she wait to get the shingles vaccine , being 1 month after having it.

## 2022-10-17 RX ORDER — LOSARTAN POTASSIUM 100 MG/1
TABLET ORAL
Qty: 90 TABLET | Refills: 3 | Status: SHIPPED | OUTPATIENT
Start: 2022-10-17 | End: 2023-10-12

## 2022-10-18 ENCOUNTER — CLINICAL SUPPORT (OUTPATIENT)
Dept: REHABILITATION | Facility: HOSPITAL | Age: 52
End: 2022-10-18
Payer: COMMERCIAL

## 2022-10-18 DIAGNOSIS — R29.3 POSTURE ABNORMALITY: ICD-10-CM

## 2022-10-18 DIAGNOSIS — M25.652 DECREASED RANGE OF LEFT HIP MOVEMENT: ICD-10-CM

## 2022-10-18 DIAGNOSIS — R53.1 DECREASED STRENGTH: Primary | ICD-10-CM

## 2022-10-18 PROCEDURE — 97112 NEUROMUSCULAR REEDUCATION: CPT | Mod: CQ

## 2022-10-18 PROCEDURE — 97110 THERAPEUTIC EXERCISES: CPT | Mod: CQ

## 2022-10-18 PROCEDURE — 97140 MANUAL THERAPY 1/> REGIONS: CPT | Mod: CQ

## 2022-10-18 NOTE — PROGRESS NOTES
"  Outpatient Therapy Updated Plan of Care     Visit Date: 10/18/2022  Name: Ruth Harper  Clinic Number: [id number]    Therapy Diagnosis:   Encounter Diagnoses   Name Primary?    Decreased strength Yes    Posture abnormality     Decreased range of left hip movement      Physician: Tunde Venegas,*     Physician Orders: PT Eval and Treat   Medical Diagnosis from Referral: M70.72 (ICD-10-CM) - Ischial bursitis of left side  Evaluation Date: 6/6/2022  Authorization Period Expiration:12/31/2022  New Plan of Care Expiration: 3/7/2022  Visit # / Visits authorized: 20/24     Time In: 1600  Time Out: 1700  Total Billable Time:  55 minutes     Precautions: Standard      Initial FOTO: 33% (20% predicted)   FOTO 1st F/U: 33% (6/29/22)   FOTO 2nd F/U:     Subjective      Pt reports: continued soreness in L glute.      She was compliant with home exercise program.  Response to previous treatment: n/a  Functional change: ongoing     Pain: 0/10  Location: left buttocks      Objective     Update:   Assessment: (10/7)   Slump: (+) L LE with knee ext    SLR (-)   Lumbar AROM: full and pain-free   Hip Hinge: fair; requires cueing to perform correctly     Hip Abd/Ext: 4-/5 B    Hip PGM: 3/5 B     Ruth received therapeutic exercises to develop strength, endurance and ROM for 15 minutes including:    Open books 2 x 15 B   Bridges YTB 20 x 5"  S/L clams GTB 2 x 10 x 10" ally    NP:  Supine sciatic nerve sliders 2 x 15  Supine Thoracic Ext with half foam along back x 20 with exhale at end range of BUE flexion  Child's pose + Thoracic Ext/Rot 2 x 15 B   Standing hip flexor stretch with PPT 3 x 30" bilat    Education - HEP/activity modification     Ruth received the following manual therapy techniques: Joint mobilizations were applied to the: hip/thoracic for 14 minutes, including:     Lumbar reassessment as above  Prone CT junction gapping mobs, grade 3/4   Prone thoracic manipulation - pt agreeable   Lumbar L4-S1 Gr III " "gapping  Sidelying breaking bread    Ruth participated in neuromuscular re-education activities to improve: Coordination, Kinesthetic, Sense and Proprioception for 26 minutes. The following activities were included:    TA activation 10 x 10"  TA marching 2 x 10  TA BKFO 2 x 10  Standing Paloff press in mini squat c neutral spine MTB 3 x 10 x 5" ally  Wall squats c PPT 3 x 10    NP:  Bird dog LE only 2 x 10 B   Prone on elbows press up 2 x 10  PPT 10 x 10"  Hip Hinging c dowel x 20  20in DL squat s dowel 2 x 10  Iso shldr ext hold c slow marching OTB 2 x 10  Sit<>Stands with Hip Hinge 3 x 8   PPT c OH handcuffs YTB 3 x 10  PPT c iso pallof hold x 3 rounds ally  Lateral walks c neutral spine YTB x 2 turf laps       Assessment     Ruth did well with new exercises today. Added wall squats to HEP for postural re-ed. No adverse effects reported p tx.     Ruth Is progressing well towards her goals.   Pt prognosis is Good.      Pt will continue to benefit from skilled outpatient physical therapy to address the deficits listed in the problem list box on initial evaluation, provide pt/family education and to maximize pt's level of independence in the home and community environment.      Pt's spiritual, cultural and educational needs considered and pt agreeable to plan of care and goals.     Anticipated barriers to physical therapy: none    Previous Short Term Goals Status:   GOALS: Short Term Goals:  4 weeks  1.Report decreased knee pain < / = 2/10 to increase tolerance for work and running activities. - MET (8/2/2022)  2. Pt will demonstrate a (-) Slump test to demonstrate decreased neural mechanosensivity. -   3. Increase strength by 1/3 MMT grade in quad strength  to increase tolerance for ADL and work activities. - MET (8/2/2022)  4. 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

## 2022-10-25 ENCOUNTER — CLINICAL SUPPORT (OUTPATIENT)
Dept: REHABILITATION | Facility: HOSPITAL | Age: 52
End: 2022-10-25
Payer: COMMERCIAL

## 2022-10-25 DIAGNOSIS — M25.652 DECREASED RANGE OF LEFT HIP MOVEMENT: ICD-10-CM

## 2022-10-25 DIAGNOSIS — R53.1 DECREASED STRENGTH: Primary | ICD-10-CM

## 2022-10-25 DIAGNOSIS — R29.3 POSTURE ABNORMALITY: ICD-10-CM

## 2022-10-25 PROCEDURE — 97112 NEUROMUSCULAR REEDUCATION: CPT | Performed by: PHYSICAL THERAPIST

## 2022-10-25 PROCEDURE — 97140 MANUAL THERAPY 1/> REGIONS: CPT | Performed by: PHYSICAL THERAPIST

## 2022-10-25 NOTE — PROGRESS NOTES
"  Outpatient Therapy Updated Plan of Care     Visit Date: 10/25/2022  Name: Ruth Harper  Clinic Number: [id number]    Therapy Diagnosis:   Encounter Diagnoses   Name Primary?    Decreased strength Yes    Posture abnormality     Decreased range of left hip movement      Physician: Tunde Venegas,*     Physician Orders: PT Eval and Treat   Medical Diagnosis from Referral: M70.72 (ICD-10-CM) - Ischial bursitis of left side  Evaluation Date: 6/6/2022  Authorization Period Expiration:12/31/2022  New Plan of Care Expiration: 3/7/2022  Visit # / Visits authorized: 20/24     Time In: 1600  Time Out: 1658  Total Billable Time:  58 minutes     Precautions: Standard      Initial FOTO: 33% (20% predicted)   FOTO 1st F/U: 33% (6/29/22)   FOTO 2nd F/U:     Subjective      Pt reports: I have been doing better except last night some difficulty falling asleep     She was compliant with home exercise program.  Response to previous treatment: n/a  Functional change: ongoing     Pain: 0/10  Location: left buttocks      Objective     Update:   Assessment: (10/7)   Slump: (+) L LE with knee ext and DF   SLR (-)   Left hip IR hypermobility with 4-/5 strength       Ruth received therapeutic exercises to develop strength, endurance and ROM for 15 minutes including:    Sidelying hip IR ball between knees 2 x 15  S/L clams BTB 2 x 10 x 10" ally    NP:  Supine sciatic nerve sliders 2 x 15  Supine Thoracic Ext with half foam along back x 20 with exhale at end range of BUE flexion  Child's pose + Thoracic Ext/Rot 2 x 15 B   Standing hip flexor stretch with PPT 3 x 30" bilat    Education - HEP/activity modification     Ruth received the following manual therapy techniques: Joint mobilizations were applied to the: hip/thoracic for 14 minutes, including:     Lumbar reassessment as above  Prone CT junction gapping mobs, grade 3/4   Prone thoracic manipulation - pt agreeable   Lumbar L4-S1 Gr III gapping  Sidelying breaking " "shelbie Miranda participated in neuromuscular re-education activities to improve: Coordination, Kinesthetic, Sense and Proprioception for 26 minutes. The following activities were included:    Bird dog OTB alt UE/LE  2 x 15 B   Wall squat with post tilt and lat press 2 x 15  Paloff with lift OTB 20x B   Standing lat ext with post tilt 2 x 15 GTB    NP:    TA activation 10 x 10"  TA marching 2 x 10  TA BKFO 2 x 10  Standing Paloff press in mini squat c neutral spine MTB 3 x 10 x 5" ally  Wall squats c PPT 3 x 10    Prone on elbows press up 2 x 10  PPT 10 x 10"  Hip Hinging c dowel x 20  20in DL squat s dowel 2 x 10  Iso shldr ext hold c slow marching OTB 2 x 10  Sit<>Stands with Hip Hinge 3 x 8   PPT c OH handcuffs YTB 3 x 10  PPT c iso pallof hold x 3 rounds ally  Lateral walks c neutral spine YTB x 2 turf laps       Assessment     Ruth progressed with post oblique stability and functional squat today. +neural tension with slump but required ankle DF for symptoms today. Patient needs to improve her IR/ER stabilization to L hip to reduce LBP    Ruth Is progressing well towards her goals.   Pt prognosis is Good.      Pt will continue to benefit from skilled outpatient physical therapy to address the deficits listed in the problem list box on initial evaluation, provide pt/family education and to maximize pt's level of independence in the home and community environment.      Pt's spiritual, cultural and educational needs considered and pt agreeable to plan of care and goals.     Anticipated barriers to physical therapy: none    Previous Short Term Goals Status:   GOALS: Short Term Goals:  4 weeks  1.Report decreased knee pain < / = 2/10 to increase tolerance for work and running activities. - MET (8/2/2022)  2. Pt will demonstrate a (-) Slump test to demonstrate decreased neural mechanosensivity. -   3. Increase strength by 1/3 MMT grade in quad strength  to increase tolerance for ADL and work activities. - MET " (8/2/2022)  4. 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

## 2022-11-09 ENCOUNTER — CLINICAL SUPPORT (OUTPATIENT)
Dept: REHABILITATION | Facility: HOSPITAL | Age: 52
End: 2022-11-09
Payer: COMMERCIAL

## 2022-11-09 DIAGNOSIS — R29.3 POSTURE ABNORMALITY: ICD-10-CM

## 2022-11-09 DIAGNOSIS — M25.652 DECREASED RANGE OF LEFT HIP MOVEMENT: ICD-10-CM

## 2022-11-09 DIAGNOSIS — R53.1 DECREASED STRENGTH: Primary | ICD-10-CM

## 2022-11-09 PROCEDURE — 97112 NEUROMUSCULAR REEDUCATION: CPT | Mod: CQ

## 2022-11-09 PROCEDURE — 97110 THERAPEUTIC EXERCISES: CPT | Mod: CQ

## 2022-11-09 NOTE — PROGRESS NOTES
"  Outpatient Therapy Updated Plan of Care     Visit Date: 11/9/2022  Name: Ruth Harper  Clinic Number: [id number]    Therapy Diagnosis:   Encounter Diagnoses   Name Primary?    Decreased strength Yes    Posture abnormality     Decreased range of left hip movement      Physician: Tunde Venegas,*     Physician Orders: PT Eval and Treat   Medical Diagnosis from Referral: M70.72 (ICD-10-CM) - Ischial bursitis of left side  Evaluation Date: 6/6/2022  Authorization Period Expiration:12/31/2022  New Plan of Care Expiration: 3/7/2022  Visit # / Visits authorized: 22/24     Time In: 1604  Time Out: 1701  Total Billable Time:  51 minutes     Precautions: Standard      Initial FOTO: 33% (20% predicted)   FOTO 1st F/U: 33% (6/29/22)   FOTO 2nd F/U:     Subjective     Pt reports: her nerve symptoms have mostly subsided. If she sits for too long, her L glute will start to hurt but subsides if she stands.      She was compliant with home exercise program.  Response to previous treatment: n/a  Functional change: ongoing     Pain: 0/10  Location: left buttocks      Objective     Update:   Assessment: (10/7)   Slump: (+) L LE with knee ext and DF   SLR (-)   Left hip IR hypermobility with 4-/5 strength       Ruth received therapeutic exercises to develop strength, endurance and ROM for 25 minutes including:    Sidelying hip IR ball between knees 1.5# 2 x 10 x 5" ally  S/L clams BTB 15 x 10" ally  S/L hip ABD 1.5# 2 x 12 x 5" ally  DL shuttle 75# 3 x 15  SL shuttle 37.5# 3 x 10 ally    NP:  Supine sciatic nerve sliders 2 x 15  Supine Thoracic Ext with half foam along back x 20 with exhale at end range of BUE flexion  Child's pose + Thoracic Ext/Rot 2 x 15 B   Standing hip flexor stretch with PPT 3 x 30" bilat    Education - HEP/activity modification     Ruth received the following manual therapy techniques: Joint mobilizations were applied to the: hip/thoracic for 00 minutes, including:     Lumbar reassessment as " "above  Prone CT junction gapping mobs, grade 3/4   Prone thoracic manipulation - pt agreeable   Lumbar L4-S1 Gr III gapping  Sidelying breaking bread    Ruth participated in neuromuscular re-education activities to improve: Coordination, Kinesthetic, Sense and Proprioception for 26 minutes. The following activities were included:    TA activation 10 x 10"  TA marching x 10  TA BKFO x 10  Paloff press in staggered stance MTB 2 x 10 x 5" ally  Standing lat ext with post tilt and slow march GTB 3 x 10    NP:  Bird dog OTB alt UE/LE  2 x 15 B   Wall squat with post tilt and lat press 2 x 15  Standing Paloff press in mini squat c neutral spine MTB 3 x 10 x 5" ally  Wall squats c PPT 3 x 10  Prone on elbows press up 2 x 10  PPT 10 x 10"  Hip Hinging c dowel x 20  20in DL squat s dowel 2 x 10  Iso shldr ext hold c slow marching OTB 2 x 10  Sit<>Stands with Hip Hinge 3 x 8   PPT c OH handcuffs YTB 3 x 10  PPT c iso pallof hold x 3 rounds ally  Lateral walks c neutral spine YTB x 2 turf laps       Assessment     Ruth did well today. Good tolerance to hip/core strengthening with no c/o increased neural symptoms.     Ruth Is progressing well towards her goals.   Pt prognosis is Good.      Pt will continue to benefit from skilled outpatient physical therapy to address the deficits listed in the problem list box on initial evaluation, provide pt/family education and to maximize pt's level of independence in the home and community environment.      Pt's spiritual, cultural and educational needs considered and pt agreeable to plan of care and goals.     Anticipated barriers to physical therapy: none    Previous Short Term Goals Status:   GOALS: Short Term Goals:  4 weeks  1.Report decreased knee pain < / = 2/10 to increase tolerance for work and running activities. - MET (8/2/2022)  2. Pt will demonstrate a (-) Slump test to demonstrate decreased neural mechanosensivity. -   3. Increase strength by 1/3 MMT grade in quad " strength  to increase tolerance for ADL and work activities. - MET (8/2/2022)  4. 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

## 2022-11-16 ENCOUNTER — CLINICAL SUPPORT (OUTPATIENT)
Dept: REHABILITATION | Facility: HOSPITAL | Age: 52
End: 2022-11-16
Payer: COMMERCIAL

## 2022-11-16 DIAGNOSIS — M25.652 DECREASED RANGE OF LEFT HIP MOVEMENT: ICD-10-CM

## 2022-11-16 DIAGNOSIS — R53.1 DECREASED STRENGTH: Primary | ICD-10-CM

## 2022-11-16 DIAGNOSIS — R29.3 POSTURE ABNORMALITY: ICD-10-CM

## 2022-11-16 PROCEDURE — 97112 NEUROMUSCULAR REEDUCATION: CPT | Mod: CQ

## 2022-11-16 PROCEDURE — 97110 THERAPEUTIC EXERCISES: CPT | Mod: CQ

## 2022-11-16 NOTE — PROGRESS NOTES
"  Outpatient Therapy Updated Plan of Care     Visit Date: 11/16/2022  Name: Ruth Harper  Clinic Number: [id number]    Therapy Diagnosis:   Encounter Diagnoses   Name Primary?    Decreased strength Yes    Posture abnormality     Decreased range of left hip movement      Physician: Tunde Venegas,*     Physician Orders: PT Eval and Treat   Medical Diagnosis from Referral: M70.72 (ICD-10-CM) - Ischial bursitis of left side  Evaluation Date: 6/6/2022  Authorization Period Expiration:12/31/2022  New Plan of Care Expiration: 3/7/2022  Visit # / Visits authorized: 23/24     Time In: 1604  Time Out: 1703  Total Billable Time:  55 minutes     Precautions: Standard      Initial FOTO: 33% (20% predicted)   FOTO 1st F/U: 33% (6/29/22)   FOTO 2nd F/U:     Subjective     Pt reports: her nerve symptoms have mostly subsided. She's doing well.      She was compliant with home exercise program.  Response to previous treatment: n/a  Functional change: ongoing     Pain: 0/10  Location: left buttocks      Objective     Update:   Assessment: (10/7)   Slump: (+) L LE with knee ext and DF   SLR (-)   Left hip IR hypermobility with 4-/5 strength       Ruth received therapeutic exercises to develop strength, endurance and ROM for 25 minutes including:    S/L hip IR 2.5# 2 x 15 x 5" ally  S/L hip ER 2.5# 2 x 12 x 3" ally  DL shuttle 75# 3 x 15  SL shuttle 50# 4 x 8 ally    NP:  S/L clams BTB 15 x 10" ally  S/L hip ABD 1.5# 2 x 12 x 5" ally  Supine sciatic nerve sliders 2 x 15  Supine Thoracic Ext with half foam along back x 20 with exhale at end range of BUE flexion  Child's pose + Thoracic Ext/Rot 2 x 15 B   Standing hip flexor stretch with PPT 3 x 30" bilat    Education - HEP/activity modification     Ruth received the following manual therapy techniques: Joint mobilizations were applied to the: hip/thoracic for 00 minutes, including:     Lumbar reassessment as above  Prone CT junction gapping mobs, grade 3/4   Prone thoracic " "manipulation - pt agreeable   Lumbar L4-S1 Gr III gapping  Sidelying breaking bread    Ruth participated in neuromuscular re-education activities to improve: Coordination, Kinesthetic, Sense and Proprioception for 30 minutes. The following activities were included:    TA activation 10 x 10"  TA marching x 20  TA BKFO x 20  TA 90/90 2 x 8 x 10", emphasis on PPT on descent  Paloff press c OH lift MTB 2 x 10 ally  Bird dogs 2 x 10    NP:  Standing lat ext with post tilt and slow march GTB 3 x 10  Bird dog OTB alt UE/LE  2 x 15 B   Wall squat with post tilt and lat press 2 x 15  Wall squats c PPT 3 x 10  Prone on elbows press up 2 x 10  PPT 10 x 10"  Hip Hinging c dowel x 20  20in DL squat s dowel 2 x 10  Sit<>Stands with Hip Hinge 3 x 8   PPT c OH handcuffs YTB 3 x 10  PPT c iso pallof hold x 3 rounds ally  Lateral walks c neutral spine YTB x 2 turf laps       Assessment     Ruth did well today. Cueing required to avoid excessive lumbar rotation/extension during bird dogs. She had difficulty maintaining neutral spine during TA table tops descent. Encouraged continued compliance with HEP. No adverse effects reported p tx.     Ruth Is progressing well towards her goals.   Pt prognosis is Good.      Pt will continue to benefit from skilled outpatient physical therapy to address the deficits listed in the problem list box on initial evaluation, provide pt/family education and to maximize pt's level of independence in the home and community environment.      Pt's spiritual, cultural and educational needs considered and pt agreeable to plan of care and goals.     Anticipated barriers to physical therapy: none    Previous Short Term Goals Status:   GOALS: Short Term Goals:  4 weeks  1.Report decreased knee pain < / = 2/10 to increase tolerance for work and running activities. - MET (8/2/2022)  2. Pt will demonstrate a (-) Slump test to demonstrate decreased neural mechanosensivity. -   3. Increase strength by 1/3 " MMT grade in quad strength  to increase tolerance for ADL and work activities. - MET (8/2/2022)  4. 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

## 2022-11-21 ENCOUNTER — CLINICAL SUPPORT (OUTPATIENT)
Dept: REHABILITATION | Facility: HOSPITAL | Age: 52
End: 2022-11-21
Payer: COMMERCIAL

## 2022-11-21 DIAGNOSIS — R29.3 POSTURE ABNORMALITY: ICD-10-CM

## 2022-11-21 DIAGNOSIS — R53.1 DECREASED STRENGTH: Primary | ICD-10-CM

## 2022-11-21 DIAGNOSIS — M25.652 DECREASED RANGE OF LEFT HIP MOVEMENT: ICD-10-CM

## 2022-11-21 PROCEDURE — 97140 MANUAL THERAPY 1/> REGIONS: CPT | Mod: CQ

## 2022-11-21 PROCEDURE — 97112 NEUROMUSCULAR REEDUCATION: CPT | Mod: CQ

## 2022-11-21 PROCEDURE — 97110 THERAPEUTIC EXERCISES: CPT | Mod: CQ

## 2022-11-21 NOTE — PROGRESS NOTES
"  Outpatient Therapy Updated Plan of Care     Visit Date: 11/21/2022  Name: Ruth Harper  Clinic Number: [id number]    Therapy Diagnosis:   Encounter Diagnoses   Name Primary?    Decreased strength Yes    Posture abnormality     Decreased range of left hip movement      Physician: Tunde Venegas,*     Physician Orders: PT Eval and Treat   Medical Diagnosis from Referral: M70.72 (ICD-10-CM) - Ischial bursitis of left side  Evaluation Date: 6/6/2022  Authorization Period Expiration:12/31/2022  New Plan of Care Expiration: 3/7/2022  Visit # / Visits authorized: 24/24     Time In: 1007  Time Out: 1109  Total Billable Time:  57 minutes     Precautions: Standard      Initial FOTO: 33% (20% predicted)   FOTO 1st F/U: 33% (6/29/22)   FOTO 2nd F/U:     Subjective     Pt reports: her nerve symptoms have mostly subsided. She's doing well.      She was compliant with home exercise program.  Response to previous treatment: n/a  Functional change: ongoing     Pain: 0/10  Location: left buttocks      Objective     Update:   Assessment: (10/7)   Slump: (+) L LE with knee ext and DF   SLR (-)   Left hip IR hypermobility with 4-/5 strength       Ruth received therapeutic exercises to develop strength, endurance and ROM for 21 minutes including:    S/L hip IR 2.5# 2 x 15 x 5" ally  S/L hip ER 2.5# 2 x 12 x 3" ally  DL shuttle 87.5# 3 x 10  SL shuttle 62.5# 4 x 8 ally    NP:  S/L clams BTB 15 x 10" ally  S/L hip ABD 1.5# 2 x 12 x 5" ally  Supine sciatic nerve sliders 2 x 15  Supine Thoracic Ext with half foam along back x 20 with exhale at end range of BUE flexion  Child's pose + Thoracic Ext/Rot 2 x 15 B   Standing hip flexor stretch with PPT 3 x 30" bilat       Ruth received the following manual therapy techniques: Joint mobilizations were applied to the: hip/thoracic for 08 minutes, including:     Lumbar reassessment as above  Prone CT junction gapping mobs, grade 3/4   Prone thoracic manipulation - pt agreeable   Lumbar " "L4-S1 Gr III gapping  Sidelying breaking bread    Ruth participated in neuromuscular re-education activities to improve: Coordination, Kinesthetic, Sense and Proprioception for 28 minutes. The following activities were included:    TA activation 10 x 10"  TA marching x 20  TA BKFO x 20  TA bird dogs LE only 4x sets to fatigue  Bird dogs 3 x 10  Lateral walks c neutral spine YTB x 2 turf laps     NP:  Paloff press c OH lift MTB 2 x 10 ally  Standing lat ext with post tilt and slow march GTB 3 x 10  Bird dog OTB alt UE/LE  2 x 15 B   Wall squat with post tilt and lat press 2 x 15  Wall squats c PPT 3 x 10  Prone on elbows press up 2 x 10  PPT 10 x 10"  Hip Hinging c dowel x 20  20in DL squat s dowel 2 x 10  Sit<>Stands with Hip Hinge 3 x 8   PPT c OH handcuffs YTB 3 x 10  PPT c iso pallof hold x 3 rounds ally      Assessment     Ruth is progressing well. She displayed better control during bird dogs with reduced lumbar rotation/extension. Continued difficulty maintaining neutral spine during bird dogs. Fatigue noted during resisted lateral walks. Encouraged continued compliance with HEP. No adverse effects reported p tx.     Ruth Is progressing well towards her goals.   Pt prognosis is Good.      Pt will continue to benefit from skilled outpatient physical therapy to address the deficits listed in the problem list box on initial evaluation, provide pt/family education and to maximize pt's level of independence in the home and community environment.      Pt's spiritual, cultural and educational needs considered and pt agreeable to plan of care and goals.     Anticipated barriers to physical therapy: none    Previous Short Term Goals Status:   GOALS: Short Term Goals:  4 weeks  1.Report decreased knee pain < / = 2/10 to increase tolerance for work and running activities. - MET (8/2/2022)  2. Pt will demonstrate a (-) Slump test to demonstrate decreased neural mechanosensivity. -   3. Increase strength by 1/3 " MMT grade in quad strength  to increase tolerance for ADL and work activities. - MET (8/2/2022)  4. 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

## 2022-11-29 ENCOUNTER — PATIENT MESSAGE (OUTPATIENT)
Dept: REHABILITATION | Facility: HOSPITAL | Age: 52
End: 2022-11-29
Payer: COMMERCIAL

## 2022-11-30 ENCOUNTER — PATIENT MESSAGE (OUTPATIENT)
Dept: SPORTS MEDICINE | Facility: CLINIC | Age: 52
End: 2022-11-30
Payer: COMMERCIAL

## 2022-12-01 ENCOUNTER — PATIENT MESSAGE (OUTPATIENT)
Dept: PAIN MEDICINE | Facility: CLINIC | Age: 52
End: 2022-12-01
Payer: COMMERCIAL

## 2022-12-02 DIAGNOSIS — M54.16 LUMBAR RADICULOPATHY: ICD-10-CM

## 2022-12-02 DIAGNOSIS — G57.02 PIRIFORMIS SYNDROME, LEFT: ICD-10-CM

## 2022-12-02 DIAGNOSIS — M70.72 ISCHIAL BURSITIS OF LEFT SIDE: Primary | ICD-10-CM

## 2022-12-06 ENCOUNTER — CLINICAL SUPPORT (OUTPATIENT)
Dept: REHABILITATION | Facility: HOSPITAL | Age: 52
End: 2022-12-06
Payer: COMMERCIAL

## 2022-12-06 DIAGNOSIS — M25.652 DECREASED RANGE OF LEFT HIP MOVEMENT: ICD-10-CM

## 2022-12-06 DIAGNOSIS — R53.1 DECREASED STRENGTH: Primary | ICD-10-CM

## 2022-12-06 DIAGNOSIS — R29.3 POSTURE ABNORMALITY: ICD-10-CM

## 2022-12-06 PROCEDURE — 97112 NEUROMUSCULAR REEDUCATION: CPT | Performed by: PHYSICAL THERAPIST

## 2022-12-06 PROCEDURE — 97140 MANUAL THERAPY 1/> REGIONS: CPT | Performed by: PHYSICAL THERAPIST

## 2022-12-06 PROCEDURE — 97110 THERAPEUTIC EXERCISES: CPT | Performed by: PHYSICAL THERAPIST

## 2022-12-06 RX ORDER — ERGOCALCIFEROL 1.25 MG/1
CAPSULE ORAL
Qty: 12 CAPSULE | Refills: 0 | Status: SHIPPED | OUTPATIENT
Start: 2022-12-06 | End: 2023-03-15 | Stop reason: SDUPTHER

## 2022-12-07 NOTE — PROGRESS NOTES
"    Outpatient Therapy Updated Plan of Care     Visit Date: 12/6/2022  Name: Ruth Harper  Clinic Number: [id number]    Therapy Diagnosis:   Encounter Diagnoses   Name Primary?    Decreased strength Yes    Posture abnormality     Decreased range of left hip movement      Physician: Tunde Venegas,*     Physician Orders: PT Eval and Treat   Medical Diagnosis from Referral: M70.72 (ICD-10-CM) - Ischial bursitis of left side  Evaluation Date: 6/6/2022  Authorization Period Expiration:12/31/2022  New Plan of Care Expiration: 3/7/2022  Visit # / Visits authorized: 25/24     Time In: 1600  Time Out: 1700  Total Billable Time:  60 minutes     Precautions: Standard      Initial FOTO: 33% (20% predicted)   FOTO 1st F/U: 33% (6/29/22)   FOTO 2nd F/U:     Subjective     Pt reports: No nerve symptoms today. Notes some mid back discomfort but nothing terrible. 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  Response to previous treatment: n/a  Functional change: ongoing     Pain: 0/10  Location: left buttocks      Objective     Update:   Assessment: (10/7)   Slump: (+) L LE with knee ext and DF   SLR (-)   Left hip IR hypermobility with 4-/5 strength       Ruth received therapeutic exercises to develop strength, endurance and ROM for 24 minutes including:    Open books 15x B  Thread the needle 15x QPED   S/L clams BTB 15 x 10" ally      NP:  S/L hip IR 2.5# 2 x 15 x 5" ally  S/L hip ER 2.5# 2 x 12 x 3" ally  DL shuttle 87.5# 3 x 10  SL shuttle 62.5# 4 x 8 ally    S/L hip ABD 1.5# 2 x 12 x 5" ally  Supine sciatic nerve sliders 2 x 15  Supine Thoracic Ext with half foam along back x 20 with exhale at end range of BUE flexion  Child's pose + Thoracic Ext/Rot 2 x 15 B   Standing hip flexor stretch with PPT 3 x 30" bilat       Ruth received the following manual therapy techniques: Joint mobilizations were applied to the: hip/thoracic for 08 minutes, including:     Lumbar " "reassessment as above  Lumbar L4-S1 Gr III gapping  Sidelying breaking bread    Ruth participated in neuromuscular re-education activities to improve: Coordination, Kinesthetic, Sense and Proprioception for 28 minutes. The following activities were included:    Bird dogs OTB 3 x 10  Paloff press c OH lift ancore 5# 2 x 15 ally  Ancore anti rotation split lunge 10# 2 x 15 B  Sit to stands 8# med ball 2 x 10    NP:  TA activation 10 x 10"  TA marching x 20  TA BKFO x 20  TA bird dogs LE only 4x sets to fatigue  Lateral walks c neutral spine YTB x 2 turf laps   Standing lat ext with post tilt and slow march GTB 3 x 10  Bird dog OTB alt UE/LE  2 x 15 B   Wall squat with post tilt and lat press 2 x 15  Wall squats c PPT 3 x 10  Prone on elbows press up 2 x 10  PPT 10 x 10"  Hip Hinging c dowel x 20  20in DL squat s dowel 2 x 10  Sit<>Stands with Hip Hinge 3 x 8   PPT c OH handcuffs YTB 3 x 10  PPT c iso pallof hold x 3 rounds ally      Assessment     Ruth presented with limited thoracic mobility, improved with thread the needle and open books. She progressed functional squat and anti rotation with the lunges. Cue to avoid lumbar extension with lifting of the band during palloff press    Ruth Is progressing well towards her goals.   Pt prognosis is Good.      Pt will continue to benefit from skilled outpatient physical therapy to address the deficits listed in the problem list box on initial evaluation, provide pt/family education and to maximize pt's level of independence in the home and community environment.      Pt's spiritual, cultural and educational needs considered and pt agreeable to plan of care and goals.     Anticipated barriers to physical therapy: none    Previous Short Term Goals Status:   GOALS: Short Term Goals:  4 weeks  1.Report decreased knee pain < / = 2/10 to increase tolerance for work and running activities. - MET (8/2/2022)  2. Pt will demonstrate a (-) Slump test to demonstrate " decreased neural mechanosensivity. -   3. Increase strength by 1/3 MMT grade in quad strength  to increase tolerance for ADL and work activities. - MET (8/2/2022)  4. 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

## 2022-12-13 ENCOUNTER — CLINICAL SUPPORT (OUTPATIENT)
Dept: REHABILITATION | Facility: HOSPITAL | Age: 52
End: 2022-12-13
Payer: COMMERCIAL

## 2022-12-13 ENCOUNTER — TELEPHONE (OUTPATIENT)
Dept: OPTOMETRY | Facility: CLINIC | Age: 52
End: 2022-12-13
Payer: COMMERCIAL

## 2022-12-13 DIAGNOSIS — M25.652 DECREASED RANGE OF LEFT HIP MOVEMENT: ICD-10-CM

## 2022-12-13 DIAGNOSIS — R29.3 POSTURE ABNORMALITY: ICD-10-CM

## 2022-12-13 DIAGNOSIS — R53.1 DECREASED STRENGTH: Primary | ICD-10-CM

## 2022-12-13 PROCEDURE — 97110 THERAPEUTIC EXERCISES: CPT | Mod: CQ

## 2022-12-13 NOTE — PROGRESS NOTES
"  Outpatient Therapy Updated Plan of Care     Visit Date: 12/13/2022  Name: Ruth Harper  Clinic Number: [id number]    Therapy Diagnosis:   Encounter Diagnoses   Name Primary?    Decreased strength Yes    Posture abnormality     Decreased range of left hip movement      Physician: Tunde Venegas,*     Physician Orders: PT Eval and Treat   Medical Diagnosis from Referral: M70.72 (ICD-10-CM) - Ischial bursitis of left side  Evaluation Date: 6/6/2022  Authorization Period Expiration:12/31/2022  New Plan of Care Expiration: 3/7/2022  Visit # / Visits authorized: 26/30     Time In: 1700  Time Out: 1800  Total Billable Time:  23 minutes     Precautions: Standard      Initial FOTO: 33% (20% predicted)   FOTO 1st F/U: 33% (6/29/22)   FOTO 2nd F/U:     Subjective     Pt reports: No nerve symptoms today. 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 She takes breaks and stands with some relief. Reports compliance with thoracic mobility HEP.       She was compliant with home exercise program.  Response to previous treatment: n/a  Functional change: ongoing     Pain: 0/10  Location: left buttocks      Objective     Update:   Assessment: (10/7)   Slump: (+) L LE with knee ext and DF   SLR (-)   Left hip IR hypermobility with 4-/5 strength       Ruth received therapeutic exercises to develop strength, endurance and ROM for 15 minutes including:    Bridges BTB 2 x 15 x 5"  S/L clams BTB 15 x 10" ally    NP:  Thread the needle x 15 ally  Open books 15x B  S/L hip IR 2.5# 2 x 15 x 5" ally  S/L hip ER 2.5# 2 x 12 x 3" ally  DL shuttle 87.5# 3 x 10  SL shuttle 62.5# 4 x 8 ally     Ruth received the following manual therapy techniques: Joint mobilizations were applied to the: hip/thoracic for 00 minutes, including:     Lumbar reassessment as above  Lumbar L4-S1 Gr III gapping  Sidelying breaking bread    Ruth participated in neuromuscular re-education activities to improve: Coordination, " Kinesthetic, Sense and Proprioception for 40 minutes. The following activities were included:    TA marching x 20  TA BKFO x 20  Dead bugs LE only 3x to fatigue  Bird dogs RTB 3 x 10  Paloff press c OH lift MTB 2 x 15 ally  STS from bench 8#MB 3 x 10  Lateral walks c neutral spine YTB x 2 turf laps     NP:  Ancore anti rotation split lunge 10# 2 x 15 B  Standing lat ext with post tilt and slow march GTB 3 x 10      Assessment     Ruth did well today. She was able to progress STS depth with no c/o increased lumbar pain. Fatigue during resisted lateral walks. Min sway/lumbar rotation noted during resisted bird dogs. Encouraged continued compliance with HEP and postural awareness at work; pt voiced understanding. No adverse effects reported p tx.     Ruth Is progressing well towards her goals.   Pt prognosis is Good.      Pt will continue to benefit from skilled outpatient physical therapy to address the deficits listed in the problem list box on initial evaluation, provide pt/family education and to maximize pt's level of independence in the home and community environment.      Pt's spiritual, cultural and educational needs considered and pt agreeable to plan of care and goals.     Anticipated barriers to physical therapy: none    Previous Short Term Goals Status:   GOALS: Short Term Goals:  4 weeks  1.Report decreased knee pain < / = 2/10 to increase tolerance for work and running activities. - MET (8/2/2022)  2. Pt will demonstrate a (-) Slump test to demonstrate decreased neural mechanosensivity. -   3. Increase strength by 1/3 MMT grade in quad strength  to increase tolerance for ADL and work activities. - MET (8/2/2022)  4. 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

## 2022-12-20 ENCOUNTER — CLINICAL SUPPORT (OUTPATIENT)
Dept: REHABILITATION | Facility: HOSPITAL | Age: 52
End: 2022-12-20
Payer: COMMERCIAL

## 2022-12-20 DIAGNOSIS — M54.16 LUMBAR RADICULOPATHY: ICD-10-CM

## 2022-12-20 DIAGNOSIS — G57.02 PIRIFORMIS SYNDROME, LEFT: ICD-10-CM

## 2022-12-20 DIAGNOSIS — R29.3 POSTURE ABNORMALITY: ICD-10-CM

## 2022-12-20 DIAGNOSIS — M25.652 DECREASED RANGE OF LEFT HIP MOVEMENT: ICD-10-CM

## 2022-12-20 DIAGNOSIS — R53.1 DECREASED STRENGTH: Primary | ICD-10-CM

## 2022-12-20 DIAGNOSIS — M70.72 ISCHIAL BURSITIS OF LEFT SIDE: ICD-10-CM

## 2022-12-20 PROCEDURE — 97110 THERAPEUTIC EXERCISES: CPT | Performed by: PHYSICAL THERAPIST

## 2022-12-20 PROCEDURE — 97112 NEUROMUSCULAR REEDUCATION: CPT | Performed by: PHYSICAL THERAPIST

## 2022-12-20 NOTE — PROGRESS NOTES
"  Outpatient Therapy Discharge Note     Visit Date: 12/20/2022  Name: Ruth Harper  Clinic Number: [id number]    Therapy Diagnosis:   Encounter Diagnoses   Name Primary?    Ischial bursitis of left side     Piriformis syndrome, left     Lumbar radiculopathy     Decreased strength Yes    Posture abnormality     Decreased range of left hip movement      Physician: Tunde Venegas,*     Physician Orders: PT Eval and Treat   Medical Diagnosis from Referral: M70.72 (ICD-10-CM) - Ischial bursitis of left side  Evaluation Date: 6/6/2022  Authorization Period Expiration:12/31/2022  New Plan of Care Expiration: 3/7/2022  Visit # / Visits authorized: 27/30     Time In: 1300  Time Out: 1400  Total Billable Time:  32 minutes     Precautions: Standard      Initial FOTO: 33% (20% predicted)   FOTO 1st F/U: 33% (6/29/22)   FOTO 2nd F/U:     Subjective     Pt reports: Continue to be pain free, doing well not working right now.      She was compliant with home exercise program.  Response to previous treatment: n/a  Functional change: ongoing     Pain: 0/10  Location: left buttocks      Objective         Update:   Assessment: (10/7)   Slump: (+) L LE with knee ext and DF   SLR (-)   Left hip IR hypermobility with 4-/5 strength       Ruth received therapeutic exercises to develop strength, endurance and ROM for 15 minutes including:    Bridges BTB 2 x 15 x 5"  DL shuttle 87.5# 2 x 20  SL shuttle 62.5# 2 x 20 ally    NP:  Thread the needle x 15 ally  Open books 15x B  S/L hip IR 2.5# 2 x 15 x 5" ally  S/L hip ER 2.5# 2 x 12 x 3" ally       Ruth received the following manual therapy techniques: Joint mobilizations were applied to the: hip/thoracic for 00 minutes, including:     Lumbar reassessment as above  Lumbar L4-S1 Gr III gapping  Sidelying breaking bread    Ruth participated in neuromuscular re-education activities to improve: Coordination, Kinesthetic, Sense and Proprioception for 40 minutes. The following " activities were included:    Bird dogs RTB 3 x 10  Paloff double GTB 2 x 15  Paloff rotations OTB 2 x 15  Lunge with 4# med ball rotations 2 x 8     NP:  Ancore anti rotation split lunge 10# 2 x 15 B  Standing lat ext with post tilt and slow march GTB 3 x 10      Assessment     Ruth added anti rotation and chop motion with med ball today. No increased pain just muscular fatigue. Negative adverse neural on assessment. She is ready for discharge with her goals met and independent HEP     Ruth Is progressing well towards her goals.   Pt prognosis is Good.      Pt will continue to benefit from skilled outpatient physical therapy to address the deficits listed in the problem list box on initial evaluation, provide pt/family education and to maximize pt's level of independence in the home and community environment.      Pt's spiritual, cultural and educational needs considered and pt agreeable to plan of care and goals.     Anticipated barriers to physical therapy: none    Previous Short Term Goals Status:   GOALS: Short Term Goals:  4 weeks  1.Report decreased knee pain < / = 2/10 to increase tolerance for work and running activities. - MET (8/2/2022)  2. Pt will demonstrate a (-) Slump test to demonstrate decreased neural mechanosensivity. -   3. Increase strength by 1/3 MMT grade in quad strength  to increase tolerance for ADL and work activities. - MET (8/2/2022)  4. 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

## 2023-01-09 RX ORDER — GABAPENTIN 300 MG/1
CAPSULE ORAL
Qty: 60 CAPSULE | Refills: 3 | Status: SHIPPED | OUTPATIENT
Start: 2023-01-09

## 2023-01-23 ENCOUNTER — TELEPHONE (OUTPATIENT)
Dept: INTERNAL MEDICINE | Facility: CLINIC | Age: 53
End: 2023-01-23
Payer: COMMERCIAL

## 2023-01-23 DIAGNOSIS — Z00.00 ROUTINE MEDICAL EXAM: Primary | ICD-10-CM

## 2023-01-23 NOTE — TELEPHONE ENCOUNTER
----- Message from Jessie Luna sent at 1/23/2023 10:27 AM CST -----  Contact: Pt [phone]  type: Lab    Caller is requesting to schedule their Lab appointment prior to annual appointment.  Order is not listed in EPIC.  Please enter order and contact patient to schedule.        Preferred Date and Time of Labs: any day morning     Date of Annual Physical Appointment:5/26/23    Where would they like the lab performed?[facility]     Would the patient rather a call back or a response via [facility]? Portal

## 2023-02-20 ENCOUNTER — TELEPHONE (OUTPATIENT)
Dept: INTERNAL MEDICINE | Facility: CLINIC | Age: 53
End: 2023-02-20
Payer: COMMERCIAL

## 2023-02-20 DIAGNOSIS — B02.29 POST HERPETIC NEURALGIA: Primary | ICD-10-CM

## 2023-02-20 NOTE — TELEPHONE ENCOUNTER
Spoke to pt and she states she has this tingling, itching sensation on her scalp where the shingles were. This sensation has been happening since she has had shingles back in September 2022. Please advise.    Pt also ask when she can get the shingles vaccine, because she does not want to get shingles again. Please advise.

## 2023-02-20 NOTE — TELEPHONE ENCOUNTER
Please advise if patient is still taking gabapentin.  If not, inquire if symptoms have worsened since stopping the medication.  Also, there is no firm recommendation on administering shingles vaccine after infection with shingles other than it be given.  A year after infection can be a reasonable amount of time before getting the vaccine, because the infection is believed to have boosted immunity immediately after infection.

## 2023-02-20 NOTE — TELEPHONE ENCOUNTER
----- Message from Edith Vieira sent at 2/20/2023 10:21 AM CST -----  Contact: [phone]  Pt would like to be advised. Pt is having after effects of having shingles back in September. Pt states the area where the shingles were is constantly itching and her fore head is red from scratching Please call to advise on what she can do. Pt is using   [id number]  [place] [facility] [address]  [facility] [place] [address]  Phone: [phone] Fax: [phone].           Thank you

## 2023-02-22 NOTE — TELEPHONE ENCOUNTER
Recommend referral to Neurology for treatment of post herpetic neuralgia.  Neuropathy following shingles is typically pain, but given her symptoms are occurring in the same distribution of shingles, this is likely the cause.  There are other medications that we can try in the meantime if patient is interested.  However, they usually prescribed for pain and not itching specifically.  If she is interested, we can try Cymbalta, which is an antidepressant that is effective against nerve pain.  I do not know if it would be effective against itching.  There is hydroxyzine, which could be use for itching.  However, it is sedating, and I would prefer not to give to sedating medications together, hydroxyzine and gabapentin.

## 2023-02-23 ENCOUNTER — TELEPHONE (OUTPATIENT)
Dept: NEUROLOGY | Facility: CLINIC | Age: 53
End: 2023-02-23
Payer: COMMERCIAL

## 2023-02-23 NOTE — TELEPHONE ENCOUNTER
Spoke with Ruth to clarify goals of visit with Dr. Andrews. Ruth confirmed she is not experiencing movement complaints, and was seeking support for post-herpetic neuralgia. We canceled her appt together and I will relay to general neurology.

## 2023-02-27 ENCOUNTER — PATIENT MESSAGE (OUTPATIENT)
Dept: INTERNAL MEDICINE | Facility: CLINIC | Age: 53
End: 2023-02-27
Payer: COMMERCIAL

## 2023-03-02 ENCOUNTER — TELEPHONE (OUTPATIENT)
Dept: NEUROLOGY | Facility: CLINIC | Age: 53
End: 2023-03-02
Payer: COMMERCIAL

## 2023-03-02 NOTE — TELEPHONE ENCOUNTER
Called pt to explain that resident clinic would be a fitter fir for her particular condition. Pt expressed the need to be seen earlier than April. I informed pt I will ask staff of Dr. Eli if he is able to see for her diagnoses. Pt verbalized understanding.

## 2023-03-15 ENCOUNTER — OFFICE VISIT (OUTPATIENT)
Dept: NEUROLOGY | Facility: CLINIC | Age: 53
End: 2023-03-15
Payer: COMMERCIAL

## 2023-03-15 DIAGNOSIS — B02.29 POST HERPETIC NEURALGIA: ICD-10-CM

## 2023-03-15 PROCEDURE — 4010F PR ACE/ARB THEARPY RXD/TAKEN: ICD-10-PCS | Mod: CPTII,S$GLB,, | Performed by: STUDENT IN AN ORGANIZED HEALTH CARE EDUCATION/TRAINING PROGRAM

## 2023-03-15 PROCEDURE — 4010F ACE/ARB THERAPY RXD/TAKEN: CPT | Mod: CPTII,S$GLB,, | Performed by: STUDENT IN AN ORGANIZED HEALTH CARE EDUCATION/TRAINING PROGRAM

## 2023-03-15 PROCEDURE — 99999 PR PBB SHADOW E&M-EST. PATIENT-LVL II: ICD-10-PCS | Mod: PBBFAC,,, | Performed by: STUDENT IN AN ORGANIZED HEALTH CARE EDUCATION/TRAINING PROGRAM

## 2023-03-15 PROCEDURE — 99999 PR PBB SHADOW E&M-EST. PATIENT-LVL II: CPT | Mod: PBBFAC,,, | Performed by: STUDENT IN AN ORGANIZED HEALTH CARE EDUCATION/TRAINING PROGRAM

## 2023-03-15 PROCEDURE — 99203 OFFICE O/P NEW LOW 30 MIN: CPT | Mod: S$GLB,,, | Performed by: STUDENT IN AN ORGANIZED HEALTH CARE EDUCATION/TRAINING PROGRAM

## 2023-03-15 PROCEDURE — 99203 PR OFFICE/OUTPT VISIT, NEW, LEVL III, 30-44 MIN: ICD-10-PCS | Mod: S$GLB,,, | Performed by: STUDENT IN AN ORGANIZED HEALTH CARE EDUCATION/TRAINING PROGRAM

## 2023-03-15 RX ORDER — HYDROXYZINE HYDROCHLORIDE 25 MG/1
25 TABLET, FILM COATED ORAL NIGHTLY PRN
Qty: 30 TABLET | Refills: 0 | Status: SHIPPED | OUTPATIENT
Start: 2023-03-15 | End: 2023-04-12

## 2023-03-15 RX ORDER — ERGOCALCIFEROL 1.25 MG/1
50000 CAPSULE ORAL
Qty: 12 CAPSULE | Refills: 0 | Status: SHIPPED | OUTPATIENT
Start: 2023-03-15 | End: 2023-06-19

## 2023-03-15 RX ORDER — CAPSAICIN 0.75 MG/G
CREAM TOPICAL 3 TIMES DAILY
Qty: 1 G | Refills: 0 | Status: SHIPPED | OUTPATIENT
Start: 2023-03-15

## 2023-03-15 NOTE — PROGRESS NOTES
Neurology Clinic Note      Date: 3/15/23  Patient Name: Ruth Harper   MRN: [id number]   PCP: Montse Harris  Referring Provider: Renae Oden    Assessment and Plan:   Ruth Harper is a 52 y.o. female presenting with persistent pruritus in the left V1 distribution following a diagnosis of shingles in September 2022.  Can stop the gabapentin as this is usually used to treat neuropathic pain and likely not effective against pruritus.    Recommend a trial of hydroxyzine 25 mg nightly as needed and capsaicin cream.      Problem List Items Addressed This Visit    None  Visit Diagnoses       Post herpetic neuralgia        Relevant Medications    capsicum 0.075% (ZOSTRIX) 0.075 % topical cream    hydrOXYzine HCL (ATARAX) 25 MG tablet              Subjective:          HPI:   Ms. Ruth Harper is a 52 y.o. female with a history of HTN, HLD, shingles presenting for evaluation of persistent itching.     Was diagnosed with COVID in August. Around a week later, she developed burning pain in the left eye followed by a rash in the left V1 distribution. She was diagnosed with shingles and started on Valtrex and steroid eye drops. She responded well to the treatment with resolution of the rash and pain in her eye. Denies any vision loss or double vision. However, she continued to have persistent itching over the left V1 distribution. Denies any pain.  Symptoms are worse at night and affect her sleep.    She was previously Gabapentin for back pain and restarted it around a month back. She is currently on 300mg twice daily. Doesn't feel it has helped.       PAST MEDICAL HISTORY:  Past Medical History:   Diagnosis Date    AR (allergic rhinitis)     HTN (hypertension)     Hyperlipidemia        PAST SURGICAL HISTORY:  Past Surgical History:   Procedure Laterality Date    none         CURRENT MEDS:  Current Outpatient Medications   Medication Sig Dispense Refill    capsicum 0.075% (ZOSTRIX) 0.075 % topical cream Apply  topically 3 (three) times daily. 1 g 0    ergocalciferol (ERGOCALCIFEROL) 50,000 unit Cap TAKE 1 CAPSULE BY MOUTH EVERY 7 DAYS 12 capsule 0    fluticasone propionate (FLONASE) 50 mcg/actuation nasal spray 2  SPRAYS  IN  EACH NOSTRIL  QD  11    gabapentin (NEURONTIN) 300 MG capsule TAKE 1 CAPSULE(300 MG) BY MOUTH TWICE DAILY 60 capsule 3    hydrOXYzine HCL (ATARAX) 25 MG tablet Take 1 tablet (25 mg total) by mouth nightly as needed (Itching). 30 tablet 0    ibuprofen (ADVIL,MOTRIN) 800 MG tablet Take 800 mg by mouth 3 (three) times daily.      losartan (COZAAR) 100 MG tablet TAKE 1 TABLET(100 MG) BY MOUTH EVERY DAY 90 tablet 3    meloxicam (MOBIC) 15 MG tablet Take 1 tablet (15 mg total) by mouth once daily. 15 tablet 0    multivitamin (THERAGRAN) per tablet Take 1 tablet by mouth once daily.      ofloxacin (OCUFLOX) 0.3 % ophthalmic solution Place 1 drop into the left eye 4 (four) times daily. 10 mL 0    ondansetron (ZOFRAN-ODT) 4 MG TbDL Take 1 tablet (4 mg total) by mouth every 8 (eight) hours as needed (nausea). 30 tablet 0    prednisoLONE acetate (PRED FORTE) 1 % DrpS Place 1 drop into the left eye 4 (four) times daily. 15 mL 0    valACYclovir (VALTREX) 1000 MG tablet Take 1 tablet (1,000 mg total) by mouth every 8 (eight) hours. for 7 days 21 tablet 0     No current facility-administered medications for this visit.       ALLERGIES:  Review of patient's allergies indicates:   Allergen Reactions    Penicillins Hives       FAMILY HISTORY:  Family History   Problem Relation Age of Onset    Diabetes Father     Hypertension Father     Breast cancer Paternal Aunt     Cataracts Maternal Grandmother     Stroke Maternal Grandmother     Macular degeneration Maternal Grandmother     Breast cancer Paternal Grandmother     Amblyopia Neg Hx     Blindness Neg Hx     Glaucoma Neg Hx     Retinal detachment Neg Hx     Strabismus Neg Hx        SOCIAL HISTORY:  Social History     Tobacco Use    Smoking status: Never    Smokeless  tobacco: Never   Substance Use Topics    Alcohol use: Yes     Comment: social    Drug use: No       Review of Systems:  12 system review of systems is negative except for the symptoms mentioned in HPI.      Objective:   There were no vitals filed for this visit.  General: NAD, well nourished   Eyes: no tearing, discharge, no erythema   ENT: moist mucous membranes of the oral cavity, nares patent    Neck: Supple, full range of motion  Cardiovascular: Warm and well perfused, pulses equal and symmetrical  Lungs: Normal work of breathing, normal chest wall excursions  Skin: No rash, lesions, or breakdown on exposed skin  Psychiatry: Mood and affect are appropriate   Abdomen: soft, non tender, non distended  Extremities: No cyanosis, clubbing or edema.    NEUROLOGICAL EXAMINATION:     MENTAL STATUS   Oriented to person, place, and time.   Follows 2 step commands.   Speech: speech is normal   Level of consciousness: alert    CRANIAL NERVES     CN II   Visual fields full to confrontation.     CN III, IV, VI   Pupils are equal, round, and reactive to light.  Extraocular motions are normal.   Diplopia: none  Ophthalmoparesis: none  Conjugate gaze: present    CN V   Facial sensation intact.     CN VII   Facial expression full, symmetric.     CN VIII   CN VIII normal.     CN IX, X   Palate: symmetric    CN XI   CN XI normal.     CN XII   CN XII normal.     MOTOR EXAM   Right arm pronator drift: absent  Left arm pronator drift: absent       5/5 in bilateral upper and lower extremities.     REFLEXES     Reflexes   Right brachioradialis: 2+  Left brachioradialis: 2+  Right biceps: 2+  Left biceps: 2+  Right patellar: 2+  Left patellar: 2+  Right plantar: normal  Left plantar: normal    SENSORY EXAM   Light touch normal.     GAIT AND COORDINATION     Gait  Gait: normal     Coordination   Finger to nose coordination: normal    Tremor   Intention tremor: absent      Images:    Other Studies:          Huan Eli,  MD  Department of Neurology  [facility]

## 2023-03-20 ENCOUNTER — TELEPHONE (OUTPATIENT)
Dept: OPHTHALMOLOGY | Facility: CLINIC | Age: 53
End: 2023-03-20
Payer: COMMERCIAL

## 2023-03-20 NOTE — TELEPHONE ENCOUNTER
----- Message from German Tee sent at 3/20/2023 10:28 AM CDT -----  Regarding: Urgent Appt  Pt called stating that she was seen a while back for shingles in her eye. She stated that she would like an appt because she's still having issues haven't been able to wear contact, burning, itchy, and a little bit of discharge       Contact Ruth [phone]

## 2023-03-22 ENCOUNTER — TELEPHONE (OUTPATIENT)
Dept: OPTOMETRY | Facility: CLINIC | Age: 53
End: 2023-03-22
Payer: COMMERCIAL

## 2023-03-22 NOTE — TELEPHONE ENCOUNTER
"----- Message from Bhanu Kirby MA sent at 3/22/2023 12:33 PM CDT -----    ----- Message -----  From: Kraig Howell  Sent: 3/22/2023  11:00 AM CDT  To: [facility] Optometry Clinical Support Staff    Scheduling Request        Patient Status: Np      Scheduling Appt: Shingles      Time/Date Preference: Soonest Available      Contact Preference?: [phone]       Treating Provider: Roni (Requesting)      Do you feel you need to be seen today? No        Additional Notes: Pt already has appt w/ Dr. Jefferson in July, but wants to be seen much sooner      "Thank you for all that you do for our patients"       "

## 2023-04-12 ENCOUNTER — PATIENT MESSAGE (OUTPATIENT)
Dept: ADMINISTRATIVE | Facility: HOSPITAL | Age: 53
End: 2023-04-12
Payer: COMMERCIAL

## 2023-04-12 DIAGNOSIS — Z12.11 SCREENING FOR COLON CANCER: ICD-10-CM

## 2023-04-19 ENCOUNTER — PATIENT MESSAGE (OUTPATIENT)
Dept: INTERNAL MEDICINE | Facility: CLINIC | Age: 53
End: 2023-04-19
Payer: COMMERCIAL

## 2023-05-10 DIAGNOSIS — B02.29 POST HERPETIC NEURALGIA: ICD-10-CM

## 2023-05-10 RX ORDER — HYDROXYZINE HYDROCHLORIDE 25 MG/1
TABLET, FILM COATED ORAL
Qty: 30 TABLET | Refills: 0 | Status: SHIPPED | OUTPATIENT
Start: 2023-05-10 | End: 2023-06-08

## 2023-05-16 ENCOUNTER — LAB VISIT (OUTPATIENT)
Dept: LAB | Facility: HOSPITAL | Age: 53
End: 2023-05-16
Attending: INTERNAL MEDICINE
Payer: COMMERCIAL

## 2023-05-16 DIAGNOSIS — Z00.00 ROUTINE MEDICAL EXAM: ICD-10-CM

## 2023-05-16 LAB
BACTERIA #/AREA URNS AUTO: ABNORMAL /HPF
BILIRUB UR QL STRIP: NEGATIVE
CLARITY UR REFRACT.AUTO: CLEAR
COLOR UR AUTO: YELLOW
GLUCOSE UR QL STRIP: NEGATIVE
HGB UR QL STRIP: NEGATIVE
KETONES UR QL STRIP: NEGATIVE
LEUKOCYTE ESTERASE UR QL STRIP: ABNORMAL
MICROSCOPIC COMMENT: ABNORMAL
NITRITE UR QL STRIP: NEGATIVE
PH UR STRIP: 5 [PH] (ref 5–8)
PROT UR QL STRIP: NEGATIVE
RBC #/AREA URNS AUTO: 1 /HPF (ref 0–4)
SP GR UR STRIP: 1.02 (ref 1–1.03)
SQUAMOUS #/AREA URNS AUTO: 1 /HPF
URN SPEC COLLECT METH UR: ABNORMAL
WBC #/AREA URNS AUTO: 10 /HPF (ref 0–5)

## 2023-05-16 PROCEDURE — 81001 URINALYSIS AUTO W/SCOPE: CPT | Performed by: INTERNAL MEDICINE

## 2023-05-26 ENCOUNTER — OFFICE VISIT (OUTPATIENT)
Dept: INTERNAL MEDICINE | Facility: CLINIC | Age: 53
End: 2023-05-26
Payer: COMMERCIAL

## 2023-05-26 ENCOUNTER — LAB VISIT (OUTPATIENT)
Dept: LAB | Facility: HOSPITAL | Age: 53
End: 2023-05-26
Attending: INTERNAL MEDICINE
Payer: COMMERCIAL

## 2023-05-26 ENCOUNTER — CLINICAL SUPPORT (OUTPATIENT)
Dept: REHABILITATION | Facility: HOSPITAL | Age: 53
End: 2023-05-26
Payer: COMMERCIAL

## 2023-05-26 VITALS
WEIGHT: 179.44 LBS | DIASTOLIC BLOOD PRESSURE: 78 MMHG | OXYGEN SATURATION: 97 % | TEMPERATURE: 98 F | BODY MASS INDEX: 29.9 KG/M2 | HEART RATE: 60 BPM | RESPIRATION RATE: 18 BRPM | HEIGHT: 65 IN | SYSTOLIC BLOOD PRESSURE: 134 MMHG

## 2023-05-26 DIAGNOSIS — Z00.00 ROUTINE MEDICAL EXAM: Primary | ICD-10-CM

## 2023-05-26 DIAGNOSIS — Z00.00 ROUTINE MEDICAL EXAM: ICD-10-CM

## 2023-05-26 DIAGNOSIS — M25.652 DECREASED RANGE OF LEFT HIP MOVEMENT: ICD-10-CM

## 2023-05-26 DIAGNOSIS — R29.3 POSTURE ABNORMALITY: ICD-10-CM

## 2023-05-26 DIAGNOSIS — R53.1 DECREASED STRENGTH: Primary | ICD-10-CM

## 2023-05-26 LAB
ALBUMIN SERPL BCP-MCNC: 4 G/DL (ref 3.5–5.2)
ALP SERPL-CCNC: 67 U/L (ref 55–135)
ALT SERPL W/O P-5'-P-CCNC: 43 U/L (ref 10–44)
ANION GAP SERPL CALC-SCNC: 8 MMOL/L (ref 8–16)
AST SERPL-CCNC: 22 U/L (ref 10–40)
BILIRUB SERPL-MCNC: 0.4 MG/DL (ref 0.1–1)
BUN SERPL-MCNC: 20 MG/DL (ref 6–20)
CALCIUM SERPL-MCNC: 10 MG/DL (ref 8.7–10.5)
CHLORIDE SERPL-SCNC: 107 MMOL/L (ref 95–110)
CO2 SERPL-SCNC: 29 MMOL/L (ref 23–29)
CREAT SERPL-MCNC: 0.8 MG/DL (ref 0.5–1.4)
EST. GFR  (NO RACE VARIABLE): >60 ML/MIN/1.73 M^2
GLUCOSE SERPL-MCNC: 122 MG/DL (ref 70–110)
HCV AB SERPL QL IA: NORMAL
HEMOCCULT STL QL IA: NEGATIVE
POTASSIUM SERPL-SCNC: 4.2 MMOL/L (ref 3.5–5.1)
PROT SERPL-MCNC: 7.1 G/DL (ref 6–8.4)
SODIUM SERPL-SCNC: 144 MMOL/L (ref 136–145)

## 2023-05-26 PROCEDURE — 3075F SYST BP GE 130 - 139MM HG: CPT | Mod: CPTII,S$GLB,, | Performed by: INTERNAL MEDICINE

## 2023-05-26 PROCEDURE — 1159F PR MEDICATION LIST DOCUMENTED IN MEDICAL RECORD: ICD-10-PCS | Mod: CPTII,S$GLB,, | Performed by: INTERNAL MEDICINE

## 2023-05-26 PROCEDURE — 3008F PR BODY MASS INDEX (BMI) DOCUMENTED: ICD-10-PCS | Mod: CPTII,S$GLB,, | Performed by: INTERNAL MEDICINE

## 2023-05-26 PROCEDURE — 3044F HG A1C LEVEL LT 7.0%: CPT | Mod: CPTII,S$GLB,, | Performed by: INTERNAL MEDICINE

## 2023-05-26 PROCEDURE — 3075F PR MOST RECENT SYSTOLIC BLOOD PRESS GE 130-139MM HG: ICD-10-PCS | Mod: CPTII,S$GLB,, | Performed by: INTERNAL MEDICINE

## 2023-05-26 PROCEDURE — 3078F DIAST BP <80 MM HG: CPT | Mod: CPTII,S$GLB,, | Performed by: INTERNAL MEDICINE

## 2023-05-26 PROCEDURE — 99396 PREV VISIT EST AGE 40-64: CPT | Mod: S$GLB,,, | Performed by: INTERNAL MEDICINE

## 2023-05-26 PROCEDURE — 36415 COLL VENOUS BLD VENIPUNCTURE: CPT | Mod: PO | Performed by: INTERNAL MEDICINE

## 2023-05-26 PROCEDURE — 80053 COMPREHEN METABOLIC PANEL: CPT | Performed by: INTERNAL MEDICINE

## 2023-05-26 PROCEDURE — 99396 PR PREVENTIVE VISIT,EST,40-64: ICD-10-PCS | Mod: S$GLB,,, | Performed by: INTERNAL MEDICINE

## 2023-05-26 PROCEDURE — 3008F BODY MASS INDEX DOCD: CPT | Mod: CPTII,S$GLB,, | Performed by: INTERNAL MEDICINE

## 2023-05-26 PROCEDURE — 99999 PR PBB SHADOW E&M-EST. PATIENT-LVL V: ICD-10-PCS | Mod: PBBFAC,,, | Performed by: INTERNAL MEDICINE

## 2023-05-26 PROCEDURE — 97140 MANUAL THERAPY 1/> REGIONS: CPT | Performed by: PHYSICAL THERAPIST

## 2023-05-26 PROCEDURE — 99999 PR PBB SHADOW E&M-EST. PATIENT-LVL V: CPT | Mod: PBBFAC,,, | Performed by: INTERNAL MEDICINE

## 2023-05-26 PROCEDURE — 86803 HEPATITIS C AB TEST: CPT | Performed by: INTERNAL MEDICINE

## 2023-05-26 PROCEDURE — 3078F PR MOST RECENT DIASTOLIC BLOOD PRESSURE < 80 MM HG: ICD-10-PCS | Mod: CPTII,S$GLB,, | Performed by: INTERNAL MEDICINE

## 2023-05-26 PROCEDURE — 4010F PR ACE/ARB THEARPY RXD/TAKEN: ICD-10-PCS | Mod: CPTII,S$GLB,, | Performed by: INTERNAL MEDICINE

## 2023-05-26 PROCEDURE — 1160F PR REVIEW ALL MEDS BY PRESCRIBER/CLIN PHARMACIST DOCUMENTED: ICD-10-PCS | Mod: CPTII,S$GLB,, | Performed by: INTERNAL MEDICINE

## 2023-05-26 PROCEDURE — 4010F ACE/ARB THERAPY RXD/TAKEN: CPT | Mod: CPTII,S$GLB,, | Performed by: INTERNAL MEDICINE

## 2023-05-26 PROCEDURE — 3044F PR MOST RECENT HEMOGLOBIN A1C LEVEL <7.0%: ICD-10-PCS | Mod: CPTII,S$GLB,, | Performed by: INTERNAL MEDICINE

## 2023-05-26 PROCEDURE — 1159F MED LIST DOCD IN RCRD: CPT | Mod: CPTII,S$GLB,, | Performed by: INTERNAL MEDICINE

## 2023-05-26 PROCEDURE — 97161 PT EVAL LOW COMPLEX 20 MIN: CPT | Performed by: PHYSICAL THERAPIST

## 2023-05-26 PROCEDURE — 97112 NEUROMUSCULAR REEDUCATION: CPT | Performed by: PHYSICAL THERAPIST

## 2023-05-26 PROCEDURE — 1160F RVW MEDS BY RX/DR IN RCRD: CPT | Mod: CPTII,S$GLB,, | Performed by: INTERNAL MEDICINE

## 2023-05-26 NOTE — PLAN OF CARE
[facility]  Physical Therapy Direct Access Initial Evaluation    Name: Ruth Harper  Clinic Number: [id number]    Therapy Diagnosis:   Encounter Diagnoses   Name Primary?    Decreased strength Yes    Posture abnormality     Decreased range of left hip movement      Reason for Visit:: PT Eval and Treat  Evaluation Date: 5/26/2023  Authorization Period Expiration:   M70.72 (ICD-10-CM) - Other bursitis of hip, left hip   G57.02 (ICD-10-CM) - Lesion of sciatic nerve, left lower limb   M54.16 (ICD-10-CM) - Radiculopathy, lumbar region   Plan of Care Expiration: 11/26/2023   Visit # / Visits authorized: 1/ 1  FOTO Visit #:  1/1    Time In: 0900  Time Out: 1000  Total Billable Time: 60 minutes    Precautions: Standard    Medical  History:     Current Care Team:  Primary Care Provider:  YES Dr. Harris  Specialty Provider: NO    Date of Last Physical Examination: 4/12/2023    Past Medical History:  Past Medical History:   Diagnosis Date    AR (allergic rhinitis)     HTN (hypertension)     Hyperlipidemia        Past Surgical History:   has a past surgical history that includes none.  Other Surgeries  not stated above: NO    Medications:  Ruth has a current medication list which includes the following prescription(s): capsicum 0.075%, ergocalciferol, fluticasone propionate, gabapentin, hydroxyzine hcl, ibuprofen, losartan, meloxicam, multivitamin, ofloxacin, ondansetron, prednisolone acetate, and valacyclovir.  Other Medications not stated above: NO    Which of the following medications have you taken in the last week?   Aspirin: NO  Tylenol: NO  Antiinflammatories (Advil/Motrin/Ibuprofen/Aleve etc): NO  Stomach Ulcer medications: NO  Vitamins/mineral supplements: NO  Herbals/Remedies: NO  Other: NO    How much caffeinated coffee or caffeine containing beverages do you drink per day: 1    Tobacco Use: How many packs do you smoke per day: 0 for 0 years  Quit? NO    Alcohol Intake: How many days  a week do you drink alcohol?: 0  Drinks consumed in one sittin    Has anyone in your immediate family (parents, brothers, sisters) ever been treated for any of the following?  Diabetes No  Cancer No  Heart disease No  Alcoholism (chemical dependency) No  High blood pressure No  Depression No  Stroke No  Kidney disease No  Inflammatory Arthritis (Rheumatoid, Ankylosing) No    Allergies:  Review of patient's allergies indicates:   Allergen Reactions    Penicillins Hives      Latex Sensitive: No  Other Allergies not stated above: NO    Imaging: xray:   Mild narrowing of the lumbosacral disc space.  The other disc spaces are well maintained.  There is a 2-3 mm the L5/S1 retrolisthesis.  No fracture or dislocation.  No bone destruction identified    Subjective::     Date of onset: 1 month  History of current condition - Ruth reports: Patient notes she is off work for summer now. She went to [place] and was pressure washing and gardening with her daughter and the pain returned from prior therapy. She notes it has gotten better this past week since being off work and starting some of her exercises. She notes the pain travels down the left side of the hip in the glute/hamstring.     Have you recently noted:  weight loss/gain Yes   joint/muscle swelling No  nausea/vomiting No  easy bruising No  dizziness/lightheadedness No  excessive bleeding No  fatigue No  difficulty breathing No  weakness Yes  regular cough No  fever/chills/sweats No  arm/leg swelling No  numbness or tingling Yes  heart racing in your chest No  tremors No  difficulty swelling No  seizures No  Heartburn/indigestion No  double vision No  Constipation/diarrhea No  loss of vision No  blood in stools No  eye redness No  post menapause No  skin rash No  problems urinating (difficulty starting, painful etc.) No  problems sleeping No  urinary incontinence No  sexual difficulties No  blood in the urine No  night sweats No  pregnant or think you might be  pregnant No  hearing problems  No  stress at home or work No    Prior Therapy: Yes same condition  Social History: She lives with their family  Occupation: Teacher - [employer]   Leisure Activities: travel to see her daughter  Prior Level of Function: Independent   Current Level of Function: Ind pain with ADL     Pain:  Current 3/10, worst 5/10, best 2/10   Location: left back  and buttocks   Description: Aching and Tingling  Aggravating Factors: Morning  Easing Factors: rest    Pts goals: return to ADL without pain    Objective     Observation: Patient is a 52 y.o. female presenting alert and oriented    Posture:  increased lumbar lordosis     Lumbar Range of Motion:    Limitations Pain   Flexion 75   On the L glute        Extension 50   N/a        Left Side Bending 75 -        Right Side Bending 75 -            Lower Extremity Strength  Right LE  Left LE    Quadriceps: 5/5 Quadriceps: 5/5   Hamstrings: 4+/5 Hamstrings: 4+/5   Iliospoas: 4+/5 Iliospoas: 4+/5   Hip extension:  4/5 Hip extension: 4-/5   PGM: 3+/5 PGM: 3+/5   Hip ER:  4-/5 Hip ER: 4-/5   Hip IR: 5/5 Hip IR: 5/5   Ankle dorsiflexion: 5/5 Ankle dorsiflexion: 5/5   Ankle plantarflexion: 5/5 Ankle plantarflexion: 5/5     Sensation:intact    Reflexes:  -Patellar (L3-L4): 2+  -Achilles (S1): 2+    Special Tests:  -SLR Test: + 45 degrees  -Repeated Flexion: -  -Repeated Ext: -  -Prone Instability Test: -  -Bridge Test: -  -Slump Test: + on L with knee extension     SI Special Tests:   Distraction: -  Compression: -  SI thigh thrust: -  Sacral thrust: -  Flick test: -    Joint Mobility:   -Shear testing:negative  -Segmental mobility testing:Limited L3-4 on the L, improved with gapping    Lumbar Protective Mechanisms:  -Compression:-  -Rotation:-  -Elbow rotation:-    MSI Observation    Bent Knee Fall Out poor   Alt March poor   Hand Heel Rock relief     Palpation:   -Erector Spinae: guarded   -Multifidi activation: poor    Flexibility:   -Ely's test: R = - ; L  "= -  -Hamstring : R = - ; L = -  -Sandra's test: R = + ; L = +  James Test Right  Left    Iliopsoas - -   Rectus Femoris  - -           CMS Impairment/Limitation/Restriction for FOTO Lumbar Survey    Therapist reviewed FOTO scores for Ruth Harper on 5/26/2023.   FOTO documents entered into [employer] - see Media section.    Limitation Score: 43%           TREATMENT   Treatment Time In: 1030  Treatment Time Out: 1050  Total Treatment time separate from Evaluation: 20 minutes    Ruth received the following manual therapy techniques: Joint mobilizations and Soft tissue Mobilization were applied to the: Lumbar spine for 10 minutes, including:    Sidelying lumbar gapping L3-4 B   Supine thoracic Gr V     Ruth participated in neuromuscular re-education activities to improve: Balance, Sense, Proprioception, and Posture for 10 minutes. The following activities were included:    HHR 10x 10"  Sidelying hip abd with circles 10x 3 circles    Home Exercises and Patient Education Provided    Education provided:   - Lumbar stabilization and core control    Written Home Exercises Provided: Patient instructed to cont prior HEP.  Exercises were reviewed and Ruth was able to demonstrate them prior to the end of the session.  Ruth demonstrated good  understanding of the education provided.     See EMR under Patient Instructions for exercises provided 5/26/2023.    Assessment   Ruth is a 52 y.o. female referred to outpatient Physical Therapy with a medical diagnosis of lumbar radiculopathy. Pt presents with increased neural tension to the LLE, strength deficits to the glute, pain in the L side with ADL, and trouble traveling/working around her daughters house    Pt prognosis is Excellent.   Pt will benefit from skilled outpatient Physical Therapy to address the deficits stated above and in the chart below, provide pt/family education, and to maximize pt's level of independence.     Plan of care discussed with patient: " Yes  Pt's spiritual, cultural and educational needs considered and patient is agreeable to the plan of care and goals as stated below:     Anticipated Barriers for therapy: travel     Medical Necessity is demonstrated by the following  History  Co-morbidities and personal factors that may impact the plan of care Co-morbidities:   high BMI and level of undertstanding of current condition    Personal Factors:   no deficits     low   Examination  Body Structures and Functions, activity limitations and participation restrictions that may impact the plan of care Body Regions:   back  lower extremities    Body Systems:    ROM  strength  transfers  transitions  motor control  motor learning    Participation Restrictions:   none    Activity limitations:   Learning and applying knowledge  no deficits    General Tasks and Commands  no deficits    Communication  no deficits    Mobility  no deficits    Self care  no deficits    Domestic Life  no deficits    Interactions/Relationships  no deficits    Life Areas  no deficits    Community and Social Life  no deficits         low   Clinical Presentation stable and uncomplicated low   Decision Making/ Complexity Score: low     GOALS: Short Term Goals:  4 weeks  1.Report decreased low back pain < / =  3/10  to increase tolerance for return to ADL without neural symptoms  2. Increase PROM full lumbar motion without increased neural symptoms   3. Increased strength by 1/3 MMT grade in glutes to increase tolerance for ADL and work activities.  4. Pt to tolerate HEP to improve ROM and independence with ADL's    Long Term Goals: 8 weeks  1.Report decreased lumbar pain  < / =  0 /10  to increase tolerance for return to cleaning pain free  2.Increase AROM to full motion without low back pain  3.Increase strength to >/= 4/5 in glute to increase tolerance for ADL and work activities.  4. Pt goal: return to ADL without low back pain  5. Pt will have improved gcode of CJ (20-40% limited) on  FOTO shoulder in order to demonstrate true functional improvement.     Plan   Plan of care Certification: 5/26/2023 to 11/26/2023.    Outpatient Physical Therapy 1 times weekly for 10 weeks to include the following interventions: Cervical/Lumbar Traction, Gait Training, Manual Therapy, Moist Heat/ Ice, Neuromuscular Re-ed, Patient Education, Self Care, Therapeutic Activities, and Therapeutic Exercise.     Jean-Paul Rodney, PT

## 2023-05-26 NOTE — PROGRESS NOTES
The patient is a 52 y.o. old female who presents to the office for a physical.  Review of labs reveals elevated cholesterol and liver enzymes.  Potassium is mildly elevated.    PAST MEDICAL HISTORY  Past Medical History:   Diagnosis Date    AR (allergic rhinitis)     HTN (hypertension)     Hyperlipidemia        SURGICAL HISTORY:  Past Surgical History:   Procedure Laterality Date    none           MEDS:  Medcard reviewed and updated    ALLERGIES: Allergy Card reviewed and updated    SOCIAL HISTORY:   The patient is a nonsmoker, denies alcohol or illicit drug use.    ROS:  GENERAL: No fever, chills, fatigability or weight loss.  SKIN: No rashes.  HEAD: No headaches or recent head trauma.  EYES: No photophobia, ocular pain or diplopia.  EARS: Denies ear pain, discharge or vertigo.  NOSE: No epistaxis or postnasal drip.  MOUTH & THROAT: No hoarseness or change in voice.   NODES: Denies swollen glands.  CHEST: Denies shortness of breath, wheezing, cough and sputum production.  CARDIOVASCULAR: Denies chest pain or palpitations.  ABDOMEN: Appetite fine. Denies diarrhea, abdominal pain, constipation or blood in stool.  URINARY: No flank pain, dysuria or hematuria.  MUSCULOSKELETAL: No joint stiffness or swelling. Denies back pain.  NEUROLOGIC: No history of seizures.  ENDOCRINE: Denies polyuria or polydipsia.  PSYCHIATRIC: Denies mood swings, depression, anxiety, homicidal or suicidal thoughts.    SCREENINGS:  Last cholesterol:2023 .  Last colonoscopy/iFOBT: 2022  Last mammogram: 2023  Last Pap smear: 2023  Last tetanus: 2008  Last Pneumovax: none  Last eye exam: 2023  Last bone density: 2023  Last menstrual period: postmenopausal    PE:   Vitals:  Vitals:    05/26/23 1346   BP: 134/78   Pulse: 60   Resp: 18   Temp: 97.9 °F (36.6 °C)       APPEARANCE: Well nourished, well developed, in no acute distress.    EYES: Sclerae anicteric. PERRL. EOMI.      EARS: TM's intact. No retraction or perforation.    NOSE: Mucosa pink.  Airway clear.  MOUTH & THROAT: No tonsillar enlargement. No pharyngeal erythema or exudate. No stridor.  NECK: Supple, no thyromegaly.  CHEST: Lungs clear to auscultation with unlabored respirations.  CARDIOVASCULAR: Normal S1, S2. No murmurs. No carotid bruits. No pedal edema.  ABDOMEN: Bowel sounds normal. Not distended. Soft. No tenderness or masses.   MUSCULOSKELETAL:  Normal gait, no cyanosis or clubbing.   SKIN: Normal skin turgor, warm and dry.  NEUROLOGIC: Cranial Nerves: Intact.  PSYCHIATRIC: The patient is oriented to person, place, and time and has a pleasant affect.        ASSESSMENT/PLAN:  Diagnoses and all orders for this visit:    Routine medical exam  -     Comprehensive Metabolic Panel; Future  -     Hepatitis C Antibody; Future  -     labs reviewed

## 2023-06-07 ENCOUNTER — CLINICAL SUPPORT (OUTPATIENT)
Dept: REHABILITATION | Facility: HOSPITAL | Age: 53
End: 2023-06-07
Payer: COMMERCIAL

## 2023-06-07 DIAGNOSIS — M25.652 DECREASED RANGE OF LEFT HIP MOVEMENT: ICD-10-CM

## 2023-06-07 DIAGNOSIS — R53.1 DECREASED STRENGTH: Primary | ICD-10-CM

## 2023-06-07 DIAGNOSIS — R29.3 POSTURE ABNORMALITY: ICD-10-CM

## 2023-06-07 PROCEDURE — 97530 THERAPEUTIC ACTIVITIES: CPT | Performed by: PHYSICAL THERAPIST

## 2023-06-07 PROCEDURE — 97112 NEUROMUSCULAR REEDUCATION: CPT | Performed by: PHYSICAL THERAPIST

## 2023-06-07 PROCEDURE — 97140 MANUAL THERAPY 1/> REGIONS: CPT | Performed by: PHYSICAL THERAPIST

## 2023-06-07 NOTE — PROGRESS NOTES
[facility]   Physical Therapy Treatment Note      Name: Ruth Harper  Clinic Number: [id number]    Therapy Diagnosis:   Encounter Diagnoses   Name Primary?    Decreased strength Yes    Posture abnormality     Decreased range of left hip movement      Physician: Self, Aaareferral    Visit Date: 6/7/2023    Reason for Visit:: PT Eval and Treat  Evaluation Date: 5/26/2023  Authorization Period Expiration:   M70.72 (ICD-10-CM) - Other bursitis of hip, left hip   G57.02 (ICD-10-CM) - Lesion of sciatic nerve, left lower limb   M54.16 (ICD-10-CM) - Radiculopathy, lumbar region   Plan of Care Expiration: 11/26/2023   Visit # / Visits authorized: 1/ 1  FOTO Visit #:  1/1      PTA Visit #: 0/5     Time In: 1107  Time Out: 1205  Total Billable Time: 58 minutes    Subjective     Pt reports: I am doing better this week than last. Still some pain but not as bad. Not currently shooting down my leg..  She was compliant with home exercise program.  Response to previous treatment: less pain  Functional change: less radicular symptoms    Pain: 4/10  Location: left hip      Objective      Objective Measures updated at progress report unless specified.     +slump with knee extension on the L  SLR 60 deg B     Treatment     Ruth received the treatments listed below:      therapeutic exercises to develop strength, endurance, ROM, flexibility, posture, and core stabilization for 0 minutes including:      manual therapy techniques: Joint mobilizations, Manual traction, and Soft tissue Mobilization were applied to the: Lumbar and thoracic spine for 11 minutes, including:  Lumbar reassessment and ULTT  Sidelying distraction   Sidelying lumbar gapping L3-4 L  Supine thoracic Gr V     neuromuscular re-education activities to improve: Balance, Coordination, Sense, Proprioception, and Posture for 30 minutes. The following activities were included:    Sidelying hip abd with circles 10x 3 circles  Palloff press OTB  "with lift 2 x 10  Palloff press with marching 2 x 10  Golf  press 2 x 10 B   Golf  overhead iso 2 x 10 B     therapeutic activities to improve functional performance for 17  minutes, including:    Hip hinge squats to 20" box 3 x 10  Lateral walks with CC 13# 5 laps        Patient Education and Home Exercises       Education provided:   - Lumbar stabilization and core control    Written Home Exercises Provided: YES. Exercises were reviewed and Ruth was able to demonstrate them prior to the end of the session.  Ruth demonstrated good understanding of the education provided. See EMR under Patient Instructions for exercises provided during therapy sessions    Assessment     Ruth did very well with first treatment to progress low abdominal and multifidi stabilization. Decreased adverse neural tension following thoracic manip and gapping. Educated on palloff with lift and marching for HEP addition this week    Ruth Is progressing well towards her goals.   Pt prognosis is Excellent.     Pt will continue to benefit from skilled outpatient physical therapy to address the deficits listed in the problem list box on initial evaluation, provide pt/family education and to maximize pt's level of independence in the home and community environment.     Pt's spiritual, cultural and educational needs considered and pt agreeable to plan of care and goals.     Anticipated barriers to physical therapy: work schedule    GOALS: Short Term Goals:  4 weeks  1.Report decreased low back pain < / =  3/10  to increase tolerance for return to ADL without neural symptoms(Progressing, not met)  2. Increase PROM full lumbar motion without increased neural symptoms (Progressing, not met)  3. Increased strength by 1/3 MMT grade in glutes to increase tolerance for ADL and work activities.(Progressing, not met)  4. Pt to tolerate HEP to improve ROM and independence with ADL's(Progressing, not met)     Long Term Goals: 8 " weeks  1.Report decreased lumbar pain  < / =  0 /10  to increase tolerance for return to cleaning pain free(Progressing, not met)  2.Increase AROM to full motion without low back pain(Progressing, not met)  3.Increase strength to >/= 4/5 in glute to increase tolerance for ADL and work activities.(Progressing, not met)  4. Pt goal: return to ADL without low back pain(Progressing, not met)  5. Pt will have improved gcode of CJ (20-40% limited) on FOTO shoulder in order to demonstrate true functional improvement. (Progressing, not met)    Plan     Plan of care Certification: 5/26/2023 to 11/26/2023.    Improve multifidi stabilization    Jean-Paul Rodney, PT, DPT, OCS, FAAOMPT

## 2023-06-08 DIAGNOSIS — B02.29 POST HERPETIC NEURALGIA: ICD-10-CM

## 2023-06-08 RX ORDER — HYDROXYZINE HYDROCHLORIDE 25 MG/1
TABLET, FILM COATED ORAL
Qty: 30 TABLET | Refills: 0 | Status: SHIPPED | OUTPATIENT
Start: 2023-06-08 | End: 2023-07-24

## 2023-06-19 RX ORDER — ERGOCALCIFEROL 1.25 MG/1
CAPSULE ORAL
Qty: 12 CAPSULE | Refills: 0 | Status: SHIPPED | OUTPATIENT
Start: 2023-06-19 | End: 2023-09-21 | Stop reason: SDUPTHER

## 2023-06-20 ENCOUNTER — CLINICAL SUPPORT (OUTPATIENT)
Dept: REHABILITATION | Facility: HOSPITAL | Age: 53
End: 2023-06-20
Payer: COMMERCIAL

## 2023-06-20 DIAGNOSIS — R53.1 DECREASED STRENGTH: Primary | ICD-10-CM

## 2023-06-20 DIAGNOSIS — M25.652 DECREASED RANGE OF LEFT HIP MOVEMENT: ICD-10-CM

## 2023-06-20 DIAGNOSIS — R29.3 POSTURE ABNORMALITY: ICD-10-CM

## 2023-06-20 PROCEDURE — 97530 THERAPEUTIC ACTIVITIES: CPT | Performed by: PHYSICAL THERAPIST

## 2023-06-20 PROCEDURE — 97112 NEUROMUSCULAR REEDUCATION: CPT | Performed by: PHYSICAL THERAPIST

## 2023-06-20 NOTE — PROGRESS NOTES
[facility]   Physical Therapy Treatment Note      Name: Ruth Harper  Clinic Number: [id number]    Therapy Diagnosis:   Encounter Diagnoses   Name Primary?    Decreased strength Yes    Posture abnormality     Decreased range of left hip movement      Physician: Self, Aaareferral    Visit Date: 6/20/2023    Reason for Visit:: PT Eval and Treat  Evaluation Date: 5/26/2023  Authorization Period Expiration:   M70.72 (ICD-10-CM) - Other bursitis of hip, left hip   G57.02 (ICD-10-CM) - Lesion of sciatic nerve, left lower limb   M54.16 (ICD-10-CM) - Radiculopathy, lumbar region   Plan of Care Expiration: 11/26/2023   Visit # / Visits authorized: 2/20  FOTO Visit #:  1/1      PTA Visit #: 0/5     Time In: 1300  Time Out: 1400  Total Billable Time: 58 minutes    Subjective     Pt reports: I am doing better, nerve pain is better. Last week I had orientation for my new job so it was tough sitting for those long periods of time  She was compliant with home exercise program.  Response to previous treatment: less pain  Functional change: less radicular symptoms    Pain: 4/10  Location: left hip      Objective      Objective Measures updated at progress report unless specified.     +slump with ankle DF on the L  SLR 75 deg    Treatment     Ruth received the treatments listed below:      therapeutic exercises to develop strength, endurance, ROM, flexibility, posture, and core stabilization for 0 minutes including:      manual therapy techniques: Joint mobilizations, Manual traction, and Soft tissue Mobilization were applied to the: Lumbar and thoracic spine for 0 minutes, including:  Lumbar reassessment and ULTT  Sidelying distraction   Sidelying lumbar gapping L3-4 L  Supine thoracic Gr V     neuromuscular re-education activities to improve: Balance, Coordination, Sense, Proprioception, and Posture for 46 minutes. The following activities were included:    Supien nerve gliders 2 x 20  Palloff press  "OTB with rotation 2 x 10  Supine dying bugs alt UE/LE one at a time  Bridge GTB above knees 2 x 15  Bridge with pertubations band 3x fatigue    NT  Sidelying hip abd with circles 10x 3 circles  Palloff press with marching 2 x 10  Golf  overhead iso 2 x 10 B     therapeutic activities to improve functional performance for 14  minutes, including:    Golf  press with step up 6" 2 x 15  Golf  press and rotate 2 x 10 B     NT  Hip hinge squats to 20" box 3 x 10  Lateral walks with CC 13# 5 laps        Patient Education and Home Exercises       Education provided:   - Lumbar stabilization and core control    Written Home Exercises Provided: YES. Exercises were reviewed and Ruth was able to demonstrate them prior to the end of the session.  Ruth demonstrated good understanding of the education provided. See EMR under Patient Instructions for exercises provided during therapy sessions    Assessment     Ruth is progressing with strengthening to low abdominals and added in rotation to the lumbar spine. She had less neural tension on arrival today. Progressing with golf  press and educated on how to perform with band at home      Ruth Is progressing well towards her goals.   Pt prognosis is Excellent.     Pt will continue to benefit from skilled outpatient physical therapy to address the deficits listed in the problem list box on initial evaluation, provide pt/family education and to maximize pt's level of independence in the home and community environment.     Pt's spiritual, cultural and educational needs considered and pt agreeable to plan of care and goals.     Anticipated barriers to physical therapy: work schedule    GOALS: Short Term Goals:  4 weeks  1.Report decreased low back pain < / =  3/10  to increase tolerance for return to ADL without neural symptoms(Progressing, not met)  2. Increase PROM full lumbar motion without increased neural symptoms (Progressing, not met)  3. " Increased strength by 1/3 MMT grade in glutes to increase tolerance for ADL and work activities.(Progressing, not met)  4. Pt to tolerate HEP to improve ROM and independence with ADL's(Progressing, not met)     Long Term Goals: 8 weeks  1.Report decreased lumbar pain  < / =  0 /10  to increase tolerance for return to cleaning pain free(Progressing, not met)  2.Increase AROM to full motion without low back pain(Progressing, not met)  3.Increase strength to >/= 4/5 in glute to increase tolerance for ADL and work activities.(Progressing, not met)  4. Pt goal: return to ADL without low back pain(Progressing, not met)  5. Pt will have improved gcode of CJ (20-40% limited) on FOTO shoulder in order to demonstrate true functional improvement. (Progressing, not met)    Plan     Plan of care Certification: 5/26/2023 to 11/26/2023.    Improve multifidi stabilization    Jean-Paul Rodney, PT, DPT, OCS, FAAOMPT

## 2023-06-26 ENCOUNTER — CLINICAL SUPPORT (OUTPATIENT)
Dept: REHABILITATION | Facility: HOSPITAL | Age: 53
End: 2023-06-26
Payer: COMMERCIAL

## 2023-06-26 DIAGNOSIS — M25.652 DECREASED RANGE OF LEFT HIP MOVEMENT: ICD-10-CM

## 2023-06-26 DIAGNOSIS — R53.1 DECREASED STRENGTH: Primary | ICD-10-CM

## 2023-06-26 DIAGNOSIS — R29.3 POSTURE ABNORMALITY: ICD-10-CM

## 2023-06-26 PROCEDURE — 97112 NEUROMUSCULAR REEDUCATION: CPT | Performed by: PHYSICAL THERAPIST

## 2023-06-26 PROCEDURE — 97530 THERAPEUTIC ACTIVITIES: CPT | Performed by: PHYSICAL THERAPIST

## 2023-06-26 PROCEDURE — 97140 MANUAL THERAPY 1/> REGIONS: CPT | Performed by: PHYSICAL THERAPIST

## 2023-06-26 NOTE — PROGRESS NOTES
[facility]   Physical Therapy Treatment Note      Name: Ruth Harper  Clinic Number: [id number]    Therapy Diagnosis:   Encounter Diagnoses   Name Primary?    Decreased strength Yes    Posture abnormality     Decreased range of left hip movement      Physician: Self, Aaareferral    Visit Date: 6/26/2023    Reason for Visit:: PT Eval and Treat  Evaluation Date: 5/26/2023  Authorization Period Expiration:   M70.72 (ICD-10-CM) - Other bursitis of hip, left hip   G57.02 (ICD-10-CM) - Lesion of sciatic nerve, left lower limb   M54.16 (ICD-10-CM) - Radiculopathy, lumbar region   Plan of Care Expiration: 11/26/2023   Visit # / Visits authorized: 3/20  FOTO Visit #:  1/1      PTA Visit #: 0/5     Time In: 1000  Time Out: 1100  Total Billable Time: 60 minutes    Subjective     Pt reports: Increased symptoms with car ride to [place], more in the lateral hip. Not bad today    She was compliant with home exercise program.  Response to previous treatment: less pain  Functional change: less radicular symptoms    Pain: 4/10  Location: left hip      Objective      Objective Measures updated at progress report unless specified.     SLR 60 deg on L, 75 deg on R  Improved following Gr V to hip and thoracic spine    Treatment     Ruth received the treatments listed below:      therapeutic exercises to develop strength, endurance, ROM, flexibility, posture, and core stabilization for 0 minutes including:      manual therapy techniques: Joint mobilizations, Manual traction, and Soft tissue Mobilization were applied to the: Lumbar and thoracic spine for 10 minutes, including:  Lumbar reassessment and ULTT  Long axis L hip Gr V  Supine thoracic Gr V     neuromuscular re-education activities to improve: Balance, Coordination, Sense, Proprioception, and Posture for 24 minutes. The following activities were included:    Open books 15x B   Hip abd with retro circles 2 x 12  Post tilt with OH flexion 2 x 10  "  Bird dog alt UE/LE 2 x 10 3" holds    NT  Supien nerve gliders 2 x 20  Palloff press OTB with rotation 2 x 10  Supine dying bugs alt UE/LE one at a time  Bridge GTB above knees 2 x 15  Bridge with pertubations band 3x fatigue  Sidelying hip abd with circles 10x 3 circles  Palloff press with marching 2 x 10  Golf  overhead iso 2 x 10 B     therapeutic activities to improve functional performance for 26  minutes, including:    TRX squats 30x  Leg Press 80# DL 2 x 15  Leg Press 50# SL 2 x 15  Split stance chops 2 x 15 OTB  Split stance low to high 2 x 15 OTB  Step up 8" with opposite UE lat extension OTB 2 x 15    NT  Golf  press with step up 6" 2 x 15  Golf  press and rotate 2 x 10 B Hip hinge squats to 20" box 3 x 10  Lateral walks with CC 13# 5 laps        Patient Education and Home Exercises       Education provided:   - Lumbar stabilization and core control    Written Home Exercises Provided: YES. Exercises were reviewed and Ruth was able to demonstrate them prior to the end of the session.  Ruth demonstrated good understanding of the education provided. See EMR under Patient Instructions for exercises provided during therapy sessions    Assessment     Ruth progressed today with stabilization to lumbar spine and glute strengthening. Great glute med/adductor strength, able to hip hinge better with TRX. Needs to continue thoracic and hip mobility as this reduced neural tension at the start of session to equal the other side.     Ruth Is progressing well towards her goals.   Pt prognosis is Excellent.     Pt will continue to benefit from skilled outpatient physical therapy to address the deficits listed in the problem list box on initial evaluation, provide pt/family education and to maximize pt's level of independence in the home and community environment.     Pt's spiritual, cultural and educational needs considered and pt agreeable to plan of care and goals.     Anticipated " barriers to physical therapy: work schedule    GOALS: Short Term Goals:  4 weeks  1.Report decreased low back pain < / =  3/10  to increase tolerance for return to ADL without neural symptoms(Progressing, not met)  2. Increase PROM full lumbar motion without increased neural symptoms (Progressing, not met)  3. Increased strength by 1/3 MMT grade in glutes to increase tolerance for ADL and work activities.(Progressing, not met)  4. Pt to tolerate HEP to improve ROM and independence with ADL's(Progressing, not met)     Long Term Goals: 8 weeks  1.Report decreased lumbar pain  < / =  0 /10  to increase tolerance for return to cleaning pain free(Progressing, not met)  2.Increase AROM to full motion without low back pain(Progressing, not met)  3.Increase strength to >/= 4/5 in glute to increase tolerance for ADL and work activities.(Progressing, not met)  4. Pt goal: return to ADL without low back pain(Progressing, not met)  5. Pt will have improved gcode of CJ (20-40% limited) on FOTO shoulder in order to demonstrate true functional improvement. (Progressing, not met)    Plan     Plan of care Certification: 5/26/2023 to 11/26/2023.    Improve multifidi stabilization    Jean-Paul Rodney, PT, DPT, OCS, FAAOMPT

## 2023-07-05 ENCOUNTER — CLINICAL SUPPORT (OUTPATIENT)
Dept: REHABILITATION | Facility: HOSPITAL | Age: 53
End: 2023-07-05
Payer: COMMERCIAL

## 2023-07-05 DIAGNOSIS — R53.1 DECREASED STRENGTH: Primary | ICD-10-CM

## 2023-07-05 DIAGNOSIS — R29.3 POSTURE ABNORMALITY: ICD-10-CM

## 2023-07-05 DIAGNOSIS — M25.652 DECREASED RANGE OF LEFT HIP MOVEMENT: ICD-10-CM

## 2023-07-05 PROCEDURE — 97530 THERAPEUTIC ACTIVITIES: CPT | Mod: CQ

## 2023-07-05 PROCEDURE — 97110 THERAPEUTIC EXERCISES: CPT | Mod: CQ

## 2023-07-05 PROCEDURE — 97112 NEUROMUSCULAR REEDUCATION: CPT | Mod: CQ

## 2023-07-05 NOTE — PROGRESS NOTES
"[facility]   Physical Therapy Treatment Note      Name: Ruth Harper  Clinic Number: [id number]    Therapy Diagnosis:   Encounter Diagnoses   Name Primary?    Decreased strength Yes    Posture abnormality     Decreased range of left hip movement      Physician: Self, Aaareferral    Visit Date: 7/5/2023    Reason for Visit:: PT Eval and Treat  Evaluation Date: 5/26/2023  Authorization Period Expiration:   M70.72 (ICD-10-CM) - Other bursitis of hip, left hip   G57.02 (ICD-10-CM) - Lesion of sciatic nerve, left lower limb   M54.16 (ICD-10-CM) - Radiculopathy, lumbar region   Plan of Care Expiration: 11/26/2023   Visit # / Visits authorized: 4/20    Time In: 1700  Time Out: 1800  Total Billable Time: 57 minutes    Subjective     Pt reports: she's had increased lateral hip discomfort lately with prolonged sitting. She is about to go out of town but is going to try to take breaks from seated position if possible.     She was compliant with home exercise program.  Response to previous treatment: less pain  Functional change: less radicular symptoms    Pain: not verbalized/10  Location: left hip      Objective      Objective Measures updated at progress report unless specified.     SLR 60 deg on L, 75 deg on R  Improved following Gr V to hip and thoracic spine    Treatment     Ruth received the treatments listed below:      therapeutic exercises to develop strength, endurance, ROM, flexibility, posture, and core stabilization for 23 minutes including:    Open books 15x B   Waddles YTB x 2 turf laps  S/L hip ER (neutral -> ER) 3# 2 x 12 x 5" ally    manual therapy techniques: Joint mobilizations, Manual traction, and Soft tissue Mobilization were applied to the: Lumbar and thoracic spine for 00 minutes, including:    Lumbar reassessment and ULTT  Long axis L hip Gr V  Supine thoracic Gr V     neuromuscular re-education activities to improve: Balance, Coordination, Sense, Proprioception, and " "Posture for 16 minutes. The following activities were included:    Supine PPT c OH handcuffs RTB 3 x 10    Bird dog alt UE/LE 3 x 10 x 3"    NT  Supine nerve gliders 2 x 20  Palloff press OTB with rotation 2 x 10  Supine dying bugs alt UE/LE one at a time  Bridge GTB above knees 2 x 15  Bridge with pertubations band 3x fatigue  Sidelying hip abd with circles 10x 3 circles  Palloff press with marching 2 x 10  Golf  overhead iso 2 x 10 B   Hip abd with retro circles 2 x 12    therapeutic activities to improve functional performance for 18 minutes, including:    Golf  press with 9in step ups 2 x 10-15 ally  Split stance chops 10# 2 x 15 ally     NT  Golf  press and rotate 2 x 10 B   Hip hinge squats to 20" box 3 x 10  Lateral walks with CC 13# 5 laps  Leg Press 80# DL 2 x 15  Leg Press 50# SL 2 x 15  Split stance low to high 2 x 15 OTB  Step up 8" with opposite UE lat extension OTB 2 x 15  TRX squats 30x      Patient Education and Home Exercises       Education provided:   - Lumbar stabilization and core control    Written Home Exercises Provided: YES. Exercises were reviewed and Ruth was able to demonstrate them prior to the end of the session.  Ruth demonstrated good understanding of the education provided. See EMR under Patient Instructions for exercises provided during therapy sessions    Assessment     Ruth did well today. She was challenged by golf  step ups when challenging L glutes; she could "feel it", but it wasn't painful. Will assess symptoms p upcoming travel and progress as appropriate. No adverse effects reported p tx.     Ruth Is progressing well towards her goals.   Pt prognosis is Excellent.     Pt will continue to benefit from skilled outpatient physical therapy to address the deficits listed in the problem list box on initial evaluation, provide pt/family education and to maximize pt's level of independence in the home and community environment.     Pt's " spiritual, cultural and educational needs considered and pt agreeable to plan of care and goals.     Anticipated barriers to physical therapy: work schedule    GOALS: Short Term Goals:  4 weeks  1.Report decreased low back pain < / =  3/10  to increase tolerance for return to ADL without neural symptoms(Progressing, not met)  2. Increase PROM full lumbar motion without increased neural symptoms (Progressing, not met)  3. Increased strength by 1/3 MMT grade in glutes to increase tolerance for ADL and work activities.(Progressing, not met)  4. Pt to tolerate HEP to improve ROM and independence with ADL's(Progressing, not met)     Long Term Goals: 8 weeks  1.Report decreased lumbar pain  < / =  0 /10  to increase tolerance for return to cleaning pain free(Progressing, not met)  2.Increase AROM to full motion without low back pain(Progressing, not met)  3.Increase strength to >/= 4/5 in glute to increase tolerance for ADL and work activities.(Progressing, not met)  4. Pt goal: return to ADL without low back pain(Progressing, not met)  5. Pt will have improved gcode of CJ (20-40% limited) on FOTO shoulder in order to demonstrate true functional improvement. (Progressing, not met)    Plan     Plan of care Certification: 5/26/2023 to 11/26/2023.    Improve multifidi stabilization    Charlotte Hernandez, HATTIE

## 2023-07-19 DIAGNOSIS — B02.29 POST HERPETIC NEURALGIA: ICD-10-CM

## 2023-07-24 RX ORDER — HYDROXYZINE HYDROCHLORIDE 25 MG/1
TABLET, FILM COATED ORAL
Qty: 30 TABLET | Refills: 0 | Status: SHIPPED | OUTPATIENT
Start: 2023-07-24

## 2023-07-25 ENCOUNTER — CLINICAL SUPPORT (OUTPATIENT)
Dept: REHABILITATION | Facility: HOSPITAL | Age: 53
End: 2023-07-25
Payer: COMMERCIAL

## 2023-07-25 DIAGNOSIS — R29.3 POSTURE ABNORMALITY: ICD-10-CM

## 2023-07-25 DIAGNOSIS — M25.652 DECREASED RANGE OF LEFT HIP MOVEMENT: ICD-10-CM

## 2023-07-25 DIAGNOSIS — R53.1 DECREASED STRENGTH: Primary | ICD-10-CM

## 2023-07-25 PROCEDURE — 97530 THERAPEUTIC ACTIVITIES: CPT | Mod: CQ

## 2023-07-25 PROCEDURE — 97112 NEUROMUSCULAR REEDUCATION: CPT | Mod: CQ

## 2023-07-25 PROCEDURE — 97110 THERAPEUTIC EXERCISES: CPT | Mod: CQ

## 2023-07-25 NOTE — PROGRESS NOTES
"[facility]   Physical Therapy Treatment Note      Name: Ruth Harper  Clinic Number: [id number]    Therapy Diagnosis:   Encounter Diagnoses   Name Primary?    Decreased strength Yes    Posture abnormality     Decreased range of left hip movement      Physician: Self, Aaareferral    Visit Date: 7/25/2023    Reason for Visit:: PT Eval and Treat  Evaluation Date: 5/26/2023  Authorization Period Expiration:   M70.72 (ICD-10-CM) - Other bursitis of hip, left hip   G57.02 (ICD-10-CM) - Lesion of sciatic nerve, left lower limb   M54.16 (ICD-10-CM) - Radiculopathy, lumbar region   Plan of Care Expiration: 11/26/2023   Visit # / Visits authorized: 5/20    Time In: 1700  Time Out: 1800  Total Billable Time: 57 minutes    Subjective     Pt reports: her symptoms have been better since previous tx. Today is her last approved visit until her insurance expires on the 31st.     She was compliant with home exercise program.  Response to previous treatment: less pain  Functional change: less radicular symptoms    Pain: not verbalized/10  Location: left hip      Objective      Objective Measures updated at progress report unless specified.     SLR 60 deg on L, 75 deg on R  Improved following Gr V to hip and thoracic spine    Treatment     Ruth received the treatments listed below:      therapeutic exercises to develop strength, endurance, ROM, flexibility, posture, and core stabilization for 20 minutes including:    Open books x 20 ally  Waddles GTB x 2 turf laps  S/L hip ER (neutral -> ER) 3# 2 x 15 x 5" ally    manual therapy techniques: Joint mobilizations, Manual traction, and Soft tissue Mobilization were applied to the: Lumbar and thoracic spine for 00 minutes, including:    Lumbar reassessment and ULTT  Long axis L hip Gr V  Supine thoracic Gr V     neuromuscular re-education activities to improve: Balance, Coordination, Sense, Proprioception, and Posture for 23 minutes. The following activities " "were included:    Supine PPT c 6#MB flexion 3 x 10    Bird dog alt UE/LE 3 x 10 x 3"  Iso ext c slow marching GTB 3 x 10  Pt education: HEP, reaching out to supervising DPT via portal for any HEP program questions    therapeutic activities to improve functional performance for 16 minutes, including:    Golf  press with 9in step ups 2 x 15 ally  Split stance chops 7# 2 x 15 ally       Patient Education and Home Exercises       Education provided:   - Lumbar stabilization and core control    Written Home Exercises Provided: YES. Exercises were reviewed and Ruth was able to demonstrate them prior to the end of the session.  Ruth demonstrated good understanding of the education provided. See EMR under Patient Instructions for exercises provided during therapy sessions    Assessment     Ruth did well today. She was challenged by golf  step ups. Min cueing required to avoid lumbar rotation during bird dogs. Reviewed HEP and encouraged pt to reach out to supervising DPT in portal if she has any questions. No adverse effects reported p tx.     Ruth Is progressing well towards her goals.   Pt prognosis is Excellent.     Pt will continue to benefit from skilled outpatient physical therapy to address the deficits listed in the problem list box on initial evaluation, provide pt/family education and to maximize pt's level of independence in the home and community environment.     Pt's spiritual, cultural and educational needs considered and pt agreeable to plan of care and goals.     Anticipated barriers to physical therapy: work schedule    GOALS: Short Term Goals:  4 weeks  1.Report decreased low back pain < / =  3/10  to increase tolerance for return to ADL without neural symptoms(Progressing, not met)  2. Increase PROM full lumbar motion without increased neural symptoms (Progressing, not met)  3. Increased strength by 1/3 MMT grade in glutes to increase tolerance for ADL and work " activities.(Progressing, not met)  4. Pt to tolerate HEP to improve ROM and independence with ADL's(Progressing, not met)      Long Term Goals: 8 weeks  1.Report decreased lumbar pain  < / =  0 /10  to increase tolerance for return to cleaning pain free(Progressing, not met)  2.Increase AROM to full motion without low back pain(Progressing, not met)  3.Increase strength to >/= 4/5 in glute to increase tolerance for ADL and work activities.(Progressing, not met)  4. Pt goal: return to ADL without low back pain(Progressing, not met)  5. Pt will have improved gcode of CJ (20-40% limited) on FOTO shoulder in order to demonstrate true functional improvement. (Progressing, not met)    Plan     Plan of care Certification: 5/26/2023 to 11/26/2023.    Improve multifidi stabilization    Charlotte Hernandez, PTA

## 2023-09-21 RX ORDER — ERGOCALCIFEROL 1.25 MG/1
CAPSULE ORAL
Qty: 12 CAPSULE | Refills: 0 | Status: SHIPPED | OUTPATIENT
Start: 2023-09-21 | End: 2023-12-26

## 2023-09-21 NOTE — TELEPHONE ENCOUNTER
Refill Routing Note   Medication(s) are not appropriate for processing by [facility] for the following reason(s):      Medication outside of protocol    [facility] action(s):  Route Care Due:  None identified          Appointments  past 12m or future 3m with PCP    Date Provider   Last Visit   5/26/2023 Montse Harris MD   Next Visit   Visit date not found Montse Harris MD   ED visits in past 90 days: 0        Note composed:2:29 PM 09/21/2023

## 2023-10-12 RX ORDER — LOSARTAN POTASSIUM 100 MG/1
TABLET ORAL
Qty: 90 TABLET | Refills: 2 | Status: SHIPPED | OUTPATIENT
Start: 2023-10-12

## 2023-10-12 NOTE — TELEPHONE ENCOUNTER
No care due was identified.  [facility] Embedded Care Due Messages. Reference number: [id number].   10/12/2023 6:58:08 AM CDT

## 2023-10-12 NOTE — TELEPHONE ENCOUNTER
Refill Decision Note   Ruth Meredith  is requesting a refill authorization.  Brief Assessment and Rationale for Refill:  Approve     Medication Therapy Plan:         Comments:     Note composed:1:57 PM 10/12/2023

## 2023-12-26 ENCOUNTER — TELEPHONE (OUTPATIENT)
Dept: INTERNAL MEDICINE | Facility: CLINIC | Age: 53
End: 2023-12-26
Payer: COMMERCIAL

## 2023-12-26 RX ORDER — ERGOCALCIFEROL 1.25 MG/1
CAPSULE ORAL
Qty: 12 CAPSULE | Refills: 0 | Status: SHIPPED | OUTPATIENT
Start: 2023-12-26

## 2023-12-26 RX ORDER — ERGOCALCIFEROL 1.25 MG/1
50000 CAPSULE ORAL
Qty: 12 CAPSULE | Refills: 0 | Status: SHIPPED | OUTPATIENT
Start: 2023-12-26

## 2023-12-26 NOTE — TELEPHONE ENCOUNTER
----- Message -----   From: Ruth Harper   Sent: 12/24/2023   8:59 PM CST   To: [facility] Clinical Support   Subject: Appointment Request                                Appointment Request From: Ruth Harper      With Provider: Montse Harris MD [[facility]]      Preferred Date Range: Any      Preferred Times: Any Time      Reason for visit: Flu and shingles vaccines      Comments:   Vaccine

## 2023-12-26 NOTE — TELEPHONE ENCOUNTER
Refill Routing Note   Medication(s) are not appropriate for processing by [facility] for the following reason(s):        Outside of protocol    [facility] action(s):  Route               Appointments  past 12m or future 3m with PCP    Date Provider   Last Visit   5/26/2023 Montse Harris MD   Next Visit   Visit date not found Montse Harris MD   ED visits in past 90 days: 0        Note composed:7:37 AM 12/26/2023

## 2023-12-26 NOTE — TELEPHONE ENCOUNTER
Refill Routing Note   Medication(s) are not appropriate for processing by [facility] for the following reason(s):        Outside of protocol    [facility] action(s):  Route               Appointments  past 12m or future 3m with PCP    Date Provider   Last Visit   5/26/2023 Montse Harris MD   Next Visit   Visit date not found Montse Harris MD   ED visits in past 90 days: 0        Note composed:1:14 PM 12/26/2023

## 2024-01-27 ENCOUNTER — OFFICE VISIT (OUTPATIENT)
Dept: URGENT CARE | Facility: CLINIC | Age: 54
End: 2024-01-27
Payer: COMMERCIAL

## 2024-01-27 VITALS
HEIGHT: 65 IN | HEART RATE: 78 BPM | DIASTOLIC BLOOD PRESSURE: 92 MMHG | TEMPERATURE: 98 F | WEIGHT: 179 LBS | RESPIRATION RATE: 18 BRPM | BODY MASS INDEX: 29.82 KG/M2 | OXYGEN SATURATION: 97 % | SYSTOLIC BLOOD PRESSURE: 143 MMHG

## 2024-01-27 DIAGNOSIS — B96.89 ACUTE BACTERIAL RHINOSINUSITIS: Primary | ICD-10-CM

## 2024-01-27 DIAGNOSIS — J01.90 ACUTE BACTERIAL RHINOSINUSITIS: Primary | ICD-10-CM

## 2024-01-27 PROCEDURE — 99213 OFFICE O/P EST LOW 20 MIN: CPT | Mod: S$GLB,,,

## 2024-01-27 RX ORDER — PREDNISONE 20 MG/1
20 TABLET ORAL DAILY
Qty: 5 TABLET | Refills: 0 | Status: SHIPPED | OUTPATIENT
Start: 2024-01-27 | End: 2024-02-01

## 2024-01-27 RX ORDER — DOXYCYCLINE HYCLATE 100 MG
100 TABLET ORAL EVERY 12 HOURS
Qty: 14 TABLET | Refills: 0 | Status: SHIPPED | OUTPATIENT
Start: 2024-01-27 | End: 2024-02-03

## 2024-01-27 NOTE — PATIENT INSTRUCTIONS
Doxycycline, which is an antibiotic, has been prescribed. Please take as directed and to completion.  Prednisone, which is a steroid, has been prescribed. Please monitor your blood pressure at home and stop taking if your blood pressure increases. Discussed adverse side effects of recurrent/long term oral steroid use: elevated blood pressure elevated blood sugar, pancreatitis,glaucoma/cataracts, weight gain in face/abdomen/neck, round face (moon face), fluid retention in legs/lungs, mood swings, upset stomach, increased risk of infections, osteoporosis and increased risk of fractures, fatigue, loss of appetite, nausea and muscle weakness, thin skin, bruising and slower wound healing.   Recommend sinus rinse with either NettiPot or NeilMed device per package directions. You can do this twice a day.  at any drugstore.  Please drink plenty of fluids to help the mucus thin out and to stay hydrated.  Please get plenty of rest to allow your body to fight the infection.     To help ease a sore throat, you can:  Use a sore throat spray.  Suck on hard candy or throat lozenges.  Gargle with warm saltwater a few times each day. Mix of 1/4 teaspoon (1.25 grams) salt in 8 ounces (240 mL) of warm water.  Use a cool mist humidifier to help you breathe easier.    Discussed prescriptions and over-the-counter medicines to help with patient's symptoms:  A steroid nose spray (flonase) can help with a stuffy nose. It can also help with drainage down the back of your throat.  An antihistamine (loratadine,zyrtec,allegra, xyzal) can help with itching, sneezing, or runny nose.  An antihistamine eye drop can help with itchy eyes.  A decongestant (pseudoephedrine,  Phenylephrine) can help with a stuffy nose. Take <10 days for congestion and rhinorrhea. Once symptoms improve, proceed with loratadine/zyrtec once a day. These ingredients can keep you up all night, decrease appetite, feel jittery, and raise blood pressure with long term  use.  OTC Coricidin can be used for patients with hypertension and palpitations because you cannot use ingredients such as pseudoephedrine and phenylephrine for oral decongestants.  Coricidin HBP Cough & Cold (Chlorpheniramine/Dextromethorphan)  Coricidin HBP Maximum Strength Multi-Symptom Flu  (Acetaminophen,Dextromethorphan, Chlorpheniramine)  Coricidin HBP® Maximum Strength Cold & Flu Day/Night (Acetaminophen,Dextromethorphan, Doxylamine, Guaifenesin)  Coricidin HBP Chest Congestion & Cough (Dextromethorphan + Guaifenesin)    If not allergic, take Tylenol (Acetaminophen) 650 mg to  1 g every 6 hours as needed for fever and/or Motrin (Ibuprofen) 600 to 800 mg every 6 hours as needed for fever as directed for control of pain and/or fever      Please remember that you have received care at an urgent care today. Urgent cares are not emergency rooms and are not equipped to handle life threatening emergencies and cannot rule in or out certain medical conditions and you may be released before all of your medical problems are known or treated.     Please arrange follow up with your primary care physician or speciality clinic within 2-5 days if your signs and symptoms have not resolved or worsen.     Patient can call our Referral Hotline at [phone] to make an appointment.      Please return here or go to the Emergency Department for any concerns or worsening of condition.  Signs of infection. These include a fever of 100.4°F (38°C) or higher, chills, cough, more sputum or change in color of sputum.  You are having so much trouble breathing that you can only say one or two words at a time.  You need to sit upright at all times to be able to breathe and or cannot lie down.  You have trouble breathing when talking or sitting still.  You have a fever of 100.4°F (38°C) or higher or chills.  You have chest pain when you cough, have trouble breathing but can still talk in full sentences, or cough up blood.

## 2024-01-27 NOTE — PROGRESS NOTES
"Subjective:      Patient ID: Ruth Harper is a 53 y.o. female.    Vitals:  height is 5' 5" (1.651 m) and weight is 81.2 kg (179 lb). Her temperature is 98.2 °F (36.8 °C). Her blood pressure is 143/92 (abnormal) and her pulse is 78. Her respiration is 18 and oxygen saturation is 97%.     Chief Complaint: Sinus Problem    Patient presents to clinic for evaluation of sinus pain, sinus pressure, nasal congestion, and headache. Symptoms onset 5 days ago and have not improved at all. Patient has tried taking oral antihistamines, Sudafed, nasal spray, cold and flu medication at home which has provided no relief. Patient has hx of sinus infections but has not had one in several months. Denies fever, ear pain, sore throat, cough, wheezing, shortness of breath, chest pain, palpitations, nausea, vomiting, body aches, dizziness.     Sinus Problem  This is a new problem. The current episode started in the past 7 days. The problem is unchanged. There has been no fever. Her pain is at a severity of 7/10. The pain is moderate. Associated symptoms include congestion, headaches and sinus pressure. Pertinent negatives include no chills, coughing, ear pain, neck pain, shortness of breath or sore throat. Past treatments include nasal decongestants and oral decongestants. The treatment provided no relief.       Constitution: Negative for chills, fatigue and fever.   HENT:  Positive for congestion, postnasal drip, sinus pain and sinus pressure. Negative for ear pain, sore throat, trouble swallowing and voice change.    Neck: Negative for neck pain and neck stiffness.   Cardiovascular:  Negative for chest pain and palpitations.   Eyes:  Negative for eye discharge and eye itching.   Respiratory:  Negative for chest tightness, cough, shortness of breath and wheezing.    Gastrointestinal:  Negative for abdominal pain, nausea and vomiting.   Musculoskeletal:  Negative for muscle cramps and muscle ache.   Skin:  Negative for pale and rash. "   Neurological:  Positive for headaches. Negative for dizziness, light-headedness and passing out.      Objective:     Physical Exam   Constitutional: She is oriented to person, place, and time.  Non-toxic appearance. She does not appear ill. No distress.   HENT:   Head: Normocephalic and atraumatic.   Ears:   Right Ear: Tympanic membrane normal.   Left Ear: Tympanic membrane normal.   Nose: Mucosal edema and congestion present. Right sinus exhibits frontal sinus tenderness. Left sinus exhibits frontal sinus tenderness.   Mouth/Throat: Mucous membranes are moist. Oropharynx is clear.   Eyes: Conjunctivae are normal. Extraocular movement intact   Neck: Neck supple. No neck rigidity present.   Cardiovascular: Normal rate, regular rhythm, normal heart sounds and normal pulses.   Pulmonary/Chest: Effort normal and breath sounds normal. She has no wheezes. She has no rhonchi. She has no rales.   Abdominal: Normal appearance.   Musculoskeletal: Normal range of motion.         General: Normal range of motion.   Lymphadenopathy:     She has no cervical adenopathy.   Neurological: She is alert, oriented to person, place, and time and at baseline.   Skin: Skin is warm and dry.   Psychiatric: Her behavior is normal. Mood normal.   Nursing note and vitals reviewed.      Assessment:     1. Acute bacterial rhinosinusitis      Plan:     Acute bacterial rhinosinusitis  -     doxycycline (VIBRA-TABS) 100 MG tablet; Take 1 tablet (100 mg total) by mouth every 12 (twelve) hours. for 7 days  Dispense: 14 tablet; Refill: 0  -     predniSONE (DELTASONE) 20 MG tablet; Take 1 tablet (20 mg total) by mouth once daily. for 5 days  Dispense: 5 tablet; Refill: 0      Patient Instructions   Doxycycline, which is an antibiotic, has been prescribed. Please take as directed and to completion.  Prednisone, which is a steroid, has been prescribed. Please monitor your blood pressure at home and stop taking if your blood pressure increases. Discussed  adverse side effects of recurrent/long term oral steroid use: elevated blood pressure elevated blood sugar, pancreatitis,glaucoma/cataracts, weight gain in face/abdomen/neck, round face (moon face), fluid retention in legs/lungs, mood swings, upset stomach, increased risk of infections, osteoporosis and increased risk of fractures, fatigue, loss of appetite, nausea and muscle weakness, thin skin, bruising and slower wound healing.   Recommend sinus rinse with either NettiPot or NeilMed device per package directions. You can do this twice a day.  at any drugstore.  Please drink plenty of fluids to help the mucus thin out and to stay hydrated.  Please get plenty of rest to allow your body to fight the infection.     To help ease a sore throat, you can:  Use a sore throat spray.  Suck on hard candy or throat lozenges.  Gargle with warm saltwater a few times each day. Mix of 1/4 teaspoon (1.25 grams) salt in 8 ounces (240 mL) of warm water.  Use a cool mist humidifier to help you breathe easier.    Discussed prescriptions and over-the-counter medicines to help with patient's symptoms:  A steroid nose spray (flonase) can help with a stuffy nose. It can also help with drainage down the back of your throat.  An antihistamine (loratadine,zyrtec,allegra, xyzal) can help with itching, sneezing, or runny nose.  An antihistamine eye drop can help with itchy eyes.  A decongestant (pseudoephedrine,  Phenylephrine) can help with a stuffy nose. Take <10 days for congestion and rhinorrhea. Once symptoms improve, proceed with loratadine/zyrtec once a day. These ingredients can keep you up all night, decrease appetite, feel jittery, and raise blood pressure with long term use.  OTC Coricidin can be used for patients with hypertension and palpitations because you cannot use ingredients such as pseudoephedrine and phenylephrine for oral decongestants.  Coricidin HBP Cough & Cold (Chlorpheniramine/Dextromethorphan)  Coricidin HBP  Maximum Strength Multi-Symptom Flu  (Acetaminophen,Dextromethorphan, Chlorpheniramine)  Coricidin HBP® Maximum Strength Cold & Flu Day/Night (Acetaminophen,Dextromethorphan, Doxylamine, Guaifenesin)  Coricidin HBP Chest Congestion & Cough (Dextromethorphan + Guaifenesin)    If not allergic, take Tylenol (Acetaminophen) 650 mg to  1 g every 6 hours as needed for fever and/or Motrin (Ibuprofen) 600 to 800 mg every 6 hours as needed for fever as directed for control of pain and/or fever      Please remember that you have received care at an urgent care today. Urgent cares are not emergency rooms and are not equipped to handle life threatening emergencies and cannot rule in or out certain medical conditions and you may be released before all of your medical problems are known or treated.     Please arrange follow up with your primary care physician or speciality clinic within 2-5 days if your signs and symptoms have not resolved or worsen.     Patient can call our Referral Hotline at [phone] to make an appointment.      Please return here or go to the Emergency Department for any concerns or worsening of condition.  Signs of infection. These include a fever of 100.4°F (38°C) or higher, chills, cough, more sputum or change in color of sputum.  You are having so much trouble breathing that you can only say one or two words at a time.  You need to sit upright at all times to be able to breathe and or cannot lie down.  You have trouble breathing when talking or sitting still.  You have a fever of 100.4°F (38°C) or higher or chills.  You have chest pain when you cough, have trouble breathing but can still talk in full sentences, or cough up blood.

## 2024-03-08 ENCOUNTER — OFFICE VISIT (OUTPATIENT)
Dept: URGENT CARE | Facility: CLINIC | Age: 54
End: 2024-03-08
Payer: COMMERCIAL

## 2024-03-08 VITALS
TEMPERATURE: 98 F | SYSTOLIC BLOOD PRESSURE: 171 MMHG | HEART RATE: 101 BPM | DIASTOLIC BLOOD PRESSURE: 85 MMHG | WEIGHT: 179 LBS | BODY MASS INDEX: 29.82 KG/M2 | HEIGHT: 65 IN | OXYGEN SATURATION: 100 % | RESPIRATION RATE: 18 BRPM

## 2024-03-08 DIAGNOSIS — I10 PRIMARY HYPERTENSION: Chronic | ICD-10-CM

## 2024-03-08 DIAGNOSIS — J02.9 SORE THROAT: ICD-10-CM

## 2024-03-08 DIAGNOSIS — J30.9 ALLERGIC RHINITIS WITH POSTNASAL DRIP: Primary | ICD-10-CM

## 2024-03-08 DIAGNOSIS — R09.82 ALLERGIC RHINITIS WITH POSTNASAL DRIP: Primary | ICD-10-CM

## 2024-03-08 LAB
CTP QC/QA: YES
CTP QC/QA: YES
MOLECULAR STREP A: NEGATIVE
SARS-COV-2 AG RESP QL IA.RAPID: NEGATIVE

## 2024-03-08 PROCEDURE — 99214 OFFICE O/P EST MOD 30 MIN: CPT | Mod: S$GLB,,, | Performed by: FAMILY MEDICINE

## 2024-03-08 PROCEDURE — 87651 STREP A DNA AMP PROBE: CPT | Mod: QW,S$GLB,, | Performed by: FAMILY MEDICINE

## 2024-03-08 PROCEDURE — 87811 SARS-COV-2 COVID19 W/OPTIC: CPT | Mod: QW,S$GLB,, | Performed by: FAMILY MEDICINE

## 2024-03-08 RX ORDER — PREDNISONE 20 MG/1
40 TABLET ORAL DAILY
Qty: 10 TABLET | Refills: 0 | Status: SHIPPED | OUTPATIENT
Start: 2024-03-08 | End: 2024-03-13

## 2024-03-08 NOTE — PROGRESS NOTES
"Subjective:      Patient ID: Ruth Harper is a 53 y.o. female.    Vitals:  height is 5' 5" (1.651 m) and weight is 81.2 kg (179 lb). Her oral temperature is 98.1 °F (36.7 °C). Her blood pressure is 171/85 (abnormal) and her pulse is 101. Her respiration is 18 and oxygen saturation is 100%.     Chief Complaint: Sore Throat    This is a 53 y.o. female who presents today with a chief complaint of  sore throat, chills - started today       Sore Throat   This is a new problem. The current episode started today. The problem has been gradually worsening. The pain is at a severity of 8/10. Pertinent negatives include no abdominal pain, congestion, coughing, diarrhea, drooling, ear discharge, ear pain, headaches, hoarse voice, plugged ear sensation, neck pain, shortness of breath, stridor, swollen glands, trouble swallowing or vomiting. She has had no exposure to strep or mono. She has tried nothing for the symptoms.     HENT:  Positive for sore throat. Negative for ear pain, ear discharge, drooling, congestion and trouble swallowing.    Neck: Negative for neck pain.   Respiratory:  Negative for cough, shortness of breath and stridor.    Gastrointestinal:  Negative for abdominal pain, vomiting and diarrhea.   Neurological:  Negative for headaches.      Objective:     Physical Exam   Constitutional: She is oriented to person, place, and time. She appears well-developed. She is cooperative.  Non-toxic appearance. She does not appear ill. No distress.   HENT:   Head: Normocephalic and atraumatic.   Ears:   Right Ear: Hearing, tympanic membrane and external ear normal.   Left Ear: Hearing, tympanic membrane and external ear normal.   Nose: Nose normal. No mucosal edema, rhinorrhea or nasal deformity. No epistaxis. Right sinus exhibits no maxillary sinus tenderness and no frontal sinus tenderness. Left sinus exhibits no maxillary sinus tenderness and no frontal sinus tenderness.   Mouth/Throat: Uvula is midline, oropharynx is " clear and moist and mucous membranes are normal. No trismus in the jaw. Normal dentition. No uvula swelling. No oropharyngeal exudate, posterior oropharyngeal edema or posterior oropharyngeal erythema.   Eyes: Conjunctivae and lids are normal. No scleral icterus.   Neck: Trachea normal and phonation normal. Neck supple. No edema present. No erythema present. No neck rigidity present.   Cardiovascular: Normal rate, regular rhythm, normal heart sounds and normal pulses.   Pulmonary/Chest: Effort normal and breath sounds normal. No respiratory distress. She has no decreased breath sounds. She has no rhonchi.   Abdominal: Normal appearance.   Musculoskeletal: Normal range of motion.         General: No deformity. Normal range of motion.   Neurological: She is alert and oriented to person, place, and time. She exhibits normal muscle tone. Coordination normal.   Skin: Skin is warm, dry, intact, not diaphoretic and not pale.   Psychiatric: Her speech is normal and behavior is normal. Judgment and thought content normal.   Nursing note and vitals reviewed.      Assessment:     1. Allergic rhinitis with postnasal drip    2. Sore throat    3. Primary hypertension        Plan:       Allergic rhinitis with postnasal drip  -     predniSONE (DELTASONE) 20 MG tablet; Take 2 tablets (40 mg total) by mouth once daily. for 5 days  Dispense: 10 tablet; Refill: 0    Sore throat  -     SARS Coronavirus 2 Antigen, POCT Manual Read  -     POCT Strep A, Molecular    Primary hypertension  Pt bp elevated today. Pt advised to continue current medications as prescribed and follow up with PCP for a bp recheck. The long term risk of elevated blood pressure expressed to the patient with clear understanding.     Thank you for choosing [facility]!     Our goal in the Urgent Care is to always provide outstanding medical care. You may receive a survey by mail or e-mail in the next week regarding your experience today. We would greatly  appreciate you completing and returning the survey. Your feedback provides us with a way to recognize our staff who provide very good care, and it helps us learn how to improve when your experience was below our aspiration of excellence.       We appreciate you trusting us with your medical care. We hope you feel better soon. We will be happy to take care of you for all of your future medical needs.  You must understand that you've received an Urgent Care treatment only and that you may be released before all your medical problems are known or treated. You, the patient, will arrange for follow up care as instructed.  Follow up with your PCP or specialty clinic as directed in the next 1-2 weeks if not improved or as needed.  You can call [phone] to schedule an appointment with the appropriate provider.  Another option is to follow up with [facility] ([email or web address]) virtually for quick simple medical advice.  If your condition worsens we recommend that you receive another evaluation at the emergency room immediately or contact your primary medical clinics after hours call service to discuss your concerns.  Please return here or go to the Emergency Department for any concerns or worsening of condition.      *If you were prescribed a narcotic or controlled medication, do not drive or operate heavy equipment or machinery while taking these medications.

## 2024-03-15 ENCOUNTER — OFFICE VISIT (OUTPATIENT)
Dept: INTERNAL MEDICINE | Facility: CLINIC | Age: 54
End: 2024-03-15
Payer: COMMERCIAL

## 2024-03-15 DIAGNOSIS — J01.90 ACUTE SINUSITIS WITH SYMPTOMS > 10 DAYS: Primary | ICD-10-CM

## 2024-03-15 PROCEDURE — 1159F MED LIST DOCD IN RCRD: CPT | Mod: CPTII,95,, | Performed by: NURSE PRACTITIONER

## 2024-03-15 PROCEDURE — 1160F RVW MEDS BY RX/DR IN RCRD: CPT | Mod: CPTII,95,, | Performed by: NURSE PRACTITIONER

## 2024-03-15 PROCEDURE — 99213 OFFICE O/P EST LOW 20 MIN: CPT | Mod: 95,,, | Performed by: NURSE PRACTITIONER

## 2024-03-15 RX ORDER — AZITHROMYCIN 250 MG/1
TABLET, FILM COATED ORAL
Qty: 6 TABLET | Refills: 0 | Status: SHIPPED | OUTPATIENT
Start: 2024-03-15 | End: 2024-03-20

## 2024-03-15 RX ORDER — FLUTICASONE PROPIONATE 50 MCG
1 SPRAY, SUSPENSION (ML) NASAL DAILY
Qty: 16 G | Refills: 5 | Status: SHIPPED | OUTPATIENT
Start: 2024-03-15

## 2024-03-15 NOTE — PROGRESS NOTES
The patient location is: [facility]  The chief complaint leading to consultation is: sinusitis    Visit type: audiovisual    Face to Face time with patient: 5 minutes  10 minutes minutes of total time spent on the encounter, which includes face to face time and non-face to face time preparing to see the patient (eg, review of tests), Obtaining and/or reviewing separately obtained history, Documenting clinical information in the electronic or other health record, Independently interpreting results (not separately reported) and communicating results to the patient/family/caregiver, or Care coordination (not separately reported).         Each patient to whom he or she provides medical services by telemedicine is:  (1) informed of the relationship between the physician and patient and the respective role of any other health care provider with respect to management of the patient; and (2) notified that he or she may decline to receive medical services by telemedicine and may withdraw from such care at any time.    Notes:   History of Present Illness   Ruth Harper is a 53 y.o. woman with medical history as listed below with virtual visit today for evaluation of sinusitis symptoms >1 week. No improvement with conservative measures. Now with sinus pressure and pain.      Past Medical History:   Diagnosis Date    AR (allergic rhinitis)     HTN (hypertension)     Hyperlipidemia        Past Surgical History:   Procedure Laterality Date    none         Social History     Socioeconomic History    Marital status:    Tobacco Use    Smoking status: Never    Smokeless tobacco: Never   Substance and Sexual Activity    Alcohol use: Yes     Comment: social    Drug use: No     Social Determinants of Health     Financial Resource Strain: Low Risk  (3/15/2024)    Overall Financial Resource Strain (CARDIA)     Difficulty of Paying Living Expenses: Not very hard   Food Insecurity: No Food Insecurity (3/15/2024)    Hunger  Vital Sign     Worried About Running Out of Food in the Last Year: Never true     Ran Out of Food in the Last Year: Never true   Transportation Needs: No Transportation Needs (3/15/2024)    PRAPARE - Transportation     Lack of Transportation (Medical): No     Lack of Transportation (Non-Medical): No   Physical Activity: Insufficiently Active (3/15/2024)    Exercise Vital Sign     Days of Exercise per Week: 2 days     Minutes of Exercise per Session: 20 min   Stress: No Stress Concern Present (3/15/2024)    [employer] [place] [employer] - Occupational Stress Questionnaire     Feeling of Stress : Only a little   Social Connections: Unknown (3/15/2024)    Social Connection and Isolation Panel [NHANES]     Frequency of Communication with Friends and Family: More than three times a week     Frequency of Social Gatherings with Friends and Family: Twice a week     Active Member of Clubs or Organizations: Yes     Attends Club or Organization Meetings: More than 4 times per year     Marital Status:    Housing Stability: Low Risk  (3/15/2024)    Housing Stability Vital Sign     Unable to Pay for Housing in the Last Year: No     Number of Places Lived in the Last Year: 1     Unstable Housing in the Last Year: No       Family History   Problem Relation Age of Onset    Diabetes Father     Hypertension Father     Breast cancer Paternal Aunt     Cataracts Maternal Grandmother     Stroke Maternal Grandmother     Macular degeneration Maternal Grandmother     Breast cancer Paternal Grandmother     Amblyopia Neg Hx     Blindness Neg Hx     Glaucoma Neg Hx     Retinal detachment Neg Hx     Strabismus Neg Hx        Review of Systems  Review of Systems   Constitutional:  Positive for malaise/fatigue. Negative for chills and fever.   HENT:  Positive for congestion, ear pain, sinus pain and sore throat. Negative for ear discharge and hearing loss.    Eyes:  Negative for discharge and redness.   Respiratory:  Positive for  cough. Negative for sputum production, shortness of breath and wheezing.    Cardiovascular:  Negative for chest pain and palpitations.   Gastrointestinal:  Negative for blood in stool, constipation, diarrhea, nausea and vomiting.   Genitourinary:  Negative for dysuria and hematuria.   Musculoskeletal:  Negative for neck pain.   Neurological:  Positive for headaches. Negative for weakness.   Endo/Heme/Allergies:  Negative for polydipsia.     A complete review of systems was otherwise negative.    Physical Exam  General appearance: alert, appears stated age, cooperative, and no distress  Lungs:  respirations are even and unlabored  Skin:  no visible rash or lesions  Neurologic: Grossly normal    Assessment/Plan  Acute sinusitis with symptoms > 10 days  Antibiotics, take until complete.  Resume Flonase, refill sent.  Continue other symptoms management.  RTC PRN.  -     azithromycin (Z-ELISHA) 250 MG tablet; Take 2 tablets by mouth on day 1; Take 1 tablet by mouth on days 2-5  Dispense: 6 tablet; Refill: 0  -     fluticasone propionate (FLONASE) 50 mcg/actuation nasal spray; 1 spray (50 mcg total) by Each Nostril route once daily.  Dispense: 16 g; Refill: 5    Patient has verbalized understanding and is in agreement with plan of care.    Follow up if symptoms worsen or fail to improve.      Answers submitted by the patient for this visit:  Review of Systems Questionnaire (Submitted on 3/15/2024)  activity change: No  unexpected weight change: No  rhinorrhea: No  trouble swallowing: No  visual disturbance: No  chest tightness: No  polyuria: No  difficulty urinating: No  menstrual problem: No  joint swelling: No  arthralgias: No  confusion: No  dysphoric mood: No

## 2024-05-22 ENCOUNTER — PATIENT MESSAGE (OUTPATIENT)
Dept: ADMINISTRATIVE | Facility: HOSPITAL | Age: 54
End: 2024-05-22
Payer: COMMERCIAL

## 2024-05-23 DIAGNOSIS — Z12.11 SCREENING FOR COLON CANCER: ICD-10-CM

## 2024-06-05 DIAGNOSIS — I10 HTN (HYPERTENSION): ICD-10-CM

## 2024-06-12 ENCOUNTER — PATIENT OUTREACH (OUTPATIENT)
Dept: ADMINISTRATIVE | Facility: HOSPITAL | Age: 54
End: 2024-06-12
Payer: COMMERCIAL

## 2024-06-12 NOTE — PROGRESS NOTES
Population Health Chart Review & Patient Outreach Details      Additional [facility] Health Notes:               Updates Requested / Reviewed:      Care Everywhere, , and Immunizations Reconciliation Completed or Queried: [place]         Health Maintenance Topics Overdue:      [facility] Score: 2     Colon Cancer Screening  Uncontrolled BP                       Health Maintenance Topic(s) Outreach Outcomes & Actions Taken:    Primary Care Appt - Outreach Outcomes & Actions Taken  : Primary Care Appt Scheduled

## 2024-06-25 ENCOUNTER — LAB VISIT (OUTPATIENT)
Dept: LAB | Facility: HOSPITAL | Age: 54
End: 2024-06-25
Attending: INTERNAL MEDICINE
Payer: COMMERCIAL

## 2024-06-25 ENCOUNTER — TELEPHONE (OUTPATIENT)
Dept: INTERNAL MEDICINE | Facility: CLINIC | Age: 54
End: 2024-06-25
Payer: COMMERCIAL

## 2024-06-25 DIAGNOSIS — Z12.11 COLON CANCER SCREENING: Primary | ICD-10-CM

## 2024-06-25 DIAGNOSIS — I10 HTN (HYPERTENSION): ICD-10-CM

## 2024-06-25 DIAGNOSIS — Z00.00 ROUTINE MEDICAL EXAM: Primary | ICD-10-CM

## 2024-06-25 LAB
ALBUMIN SERPL BCP-MCNC: 3.7 G/DL (ref 3.5–5.2)
ALP SERPL-CCNC: 53 U/L (ref 55–135)
ALT SERPL W/O P-5'-P-CCNC: 30 U/L (ref 10–44)
ANION GAP SERPL CALC-SCNC: 8 MMOL/L (ref 8–16)
AST SERPL-CCNC: 20 U/L (ref 10–40)
BILIRUB SERPL-MCNC: 0.5 MG/DL (ref 0.1–1)
BUN SERPL-MCNC: 16 MG/DL (ref 6–20)
CALCIUM SERPL-MCNC: 9.9 MG/DL (ref 8.7–10.5)
CHLORIDE SERPL-SCNC: 108 MMOL/L (ref 95–110)
CO2 SERPL-SCNC: 25 MMOL/L (ref 23–29)
CREAT SERPL-MCNC: 0.7 MG/DL (ref 0.5–1.4)
EST. GFR  (NO RACE VARIABLE): >60 ML/MIN/1.73 M^2
GLUCOSE SERPL-MCNC: 100 MG/DL (ref 70–110)
POTASSIUM SERPL-SCNC: 5 MMOL/L (ref 3.5–5.1)
PROT SERPL-MCNC: 6.7 G/DL (ref 6–8.4)
SODIUM SERPL-SCNC: 141 MMOL/L (ref 136–145)

## 2024-06-25 PROCEDURE — 36415 COLL VENOUS BLD VENIPUNCTURE: CPT | Mod: PO | Performed by: INTERNAL MEDICINE

## 2024-06-25 PROCEDURE — 80053 COMPREHEN METABOLIC PANEL: CPT | Performed by: INTERNAL MEDICINE

## 2024-06-28 ENCOUNTER — PATIENT MESSAGE (OUTPATIENT)
Dept: SPORTS MEDICINE | Facility: CLINIC | Age: 54
End: 2024-06-28
Payer: COMMERCIAL

## 2024-06-28 ENCOUNTER — PATIENT MESSAGE (OUTPATIENT)
Dept: INTERNAL MEDICINE | Facility: CLINIC | Age: 54
End: 2024-06-28

## 2024-06-28 ENCOUNTER — OFFICE VISIT (OUTPATIENT)
Dept: INTERNAL MEDICINE | Facility: CLINIC | Age: 54
End: 2024-06-28
Payer: COMMERCIAL

## 2024-06-28 VITALS
WEIGHT: 181.44 LBS | RESPIRATION RATE: 20 BRPM | HEIGHT: 65 IN | OXYGEN SATURATION: 100 % | SYSTOLIC BLOOD PRESSURE: 117 MMHG | HEART RATE: 62 BPM | BODY MASS INDEX: 30.23 KG/M2 | DIASTOLIC BLOOD PRESSURE: 85 MMHG

## 2024-06-28 DIAGNOSIS — Z00.00 ROUTINE MEDICAL EXAM: Primary | ICD-10-CM

## 2024-06-28 PROCEDURE — 99999 PR PBB SHADOW E&M-EST. PATIENT-LVL III: CPT | Mod: PBBFAC,,, | Performed by: INTERNAL MEDICINE

## 2024-06-28 RX ORDER — ZOSTER VACCINE RECOMBINANT, ADJUVANTED 50 MCG/0.5
0.5 KIT INTRAMUSCULAR ONCE
Qty: 1 EACH | Refills: 1 | Status: SHIPPED | OUTPATIENT
Start: 2024-06-28 | End: 2024-06-28

## 2024-06-28 NOTE — PROGRESS NOTES
The patient is a 54 y.o. old female who presents to the office for a physical.  Review of labs reveals elevated cholesterol.    PAST MEDICAL HISTORY  Past Medical History:   Diagnosis Date    AR (allergic rhinitis)     HTN (hypertension)     Hyperlipidemia        SURGICAL HISTORY:  Past Surgical History:   Procedure Laterality Date    none           MEDS:  Medcard reviewed and updated    ALLERGIES: Allergy Card reviewed and updated    SOCIAL HISTORY:   The patient is a nonsmoker, denies alcohol or illicit drug use.    ROS:  GENERAL: No fever, chills, fatigability or weight loss.  SKIN: No rashes.  HEAD: No headaches or recent head trauma.  EYES: No photophobia, ocular pain or diplopia.  EARS: Denies ear pain, discharge or vertigo.  NOSE: No epistaxis.  Positiver postnasal drip.  MOUTH & THROAT: No hoarseness or change in voice.   NODES: Denies swollen glands.  CHEST: Denies shortness of breath, wheezing, cough and sputum production.  CARDIOVASCULAR: Denies chest pain or palpitations.  ABDOMEN: Appetite fine. Denies diarrhea, abdominal pain, constipation or blood in stool.  URINARY: No dysuria or hematuria.  MUSCULOSKELETAL: No joint stiffness or swelling. Denies back pain.  NEUROLOGIC: No history of seizures.  ENDOCRINE: Denies polyuria or polydipsia.  PSYCHIATRIC: Denies mood swings, depression, anxiety, homicidal or suicidal thoughts.    SCREENINGS:  Last cholesterol:  2024 .  Last colonoscopy/iFOBT:  2023  Last mammogram: 2024  Last Pap smear: 2024  Last tetanus: 200-8  Last Pneumovax: none  Last eye exam: 2023  Last bone density: 2023  Last menstrual period: postmenopausal    PE:   Vitals:  Vitals:    06/28/24 0854   BP: 117/85   Pulse: 62   Resp: 20       APPEARANCE: Well nourished, well developed, in no acute distress.    EYES: Sclerae anicteric. PERRL. EOMI.      EARS: TM's intact. No retraction or perforation.    NOSE: Mucosa pink. Airway clear.  MOUTH & THROAT: No tonsillar enlargement. No pharyngeal  erythema or exudate. No stridor.  NECK: Supple, no thyromegaly.  CHEST: Lungs clear to auscultation with unlabored respirations.  CARDIOVASCULAR: Normal S1, S2. No murmurs. No carotid bruits. No pedal edema.  ABDOMEN: Bowel sounds normal. Not distended. Soft. No tenderness or masses.   MUSCULOSKELETAL:  Normal gait, no cyanosis or clubbing.    SKIN: Normal skin turgor, warm and dry.  NEUROLOGIC: Cranial Nerves: Intact.  PSYCHIATRIC: The patient is oriented to person, place, and time and has a pleasant affect.        ASSESSMENT/PLAN:  Ruth was seen today for annual exam.    Diagnoses and all orders for this visit:    Routine medical exam  -    Labs reviewed  -     cholesterol is elevated, recommend healthy diet and regular exercise    Other orders  -     varicella-zoster [id number], PF, (SHINGRIX, PF,) 50 mcg/0.5 mL injection; Inject 0.5 mLs into the muscle once. for 1 dose    The 10-year ASCVD risk score (Maria Isabel MARIE, et al., 2019) is: 3%    Values used to calculate the score:      Age: 54 years      Sex: Female      Is Non- : No      Diabetic: No      Tobacco smoker: No      Systolic Blood Pressure: 117 mmHg      Is BP treated: Yes      HDL Cholesterol: 51 mg/dL      Total Cholesterol: 250 mg/dL

## 2024-08-05 RX ORDER — ERGOCALCIFEROL 1.25 MG/1
50000 CAPSULE ORAL
Qty: 12 CAPSULE | Refills: 3 | Status: SHIPPED | OUTPATIENT
Start: 2024-08-05

## 2024-10-11 ENCOUNTER — OFFICE VISIT (OUTPATIENT)
Dept: OPHTHALMOLOGY | Facility: CLINIC | Age: 54
End: 2024-10-11
Payer: COMMERCIAL

## 2024-10-11 DIAGNOSIS — B02.33 HERPES ZOSTER KERATITIS OF LEFT EYE: Primary | ICD-10-CM

## 2024-10-11 PROCEDURE — 99999 PR PBB SHADOW E&M-EST. PATIENT-LVL III: CPT | Mod: PBBFAC,,, | Performed by: OPHTHALMOLOGY

## 2024-10-11 RX ORDER — PREDNISOLONE ACETATE 10 MG/ML
1 SUSPENSION/ DROPS OPHTHALMIC 3 TIMES DAILY
Qty: 5 ML | Refills: 1 | Status: SHIPPED | OUTPATIENT
Start: 2024-10-11 | End: 2024-11-10

## 2024-10-11 RX ORDER — PREDNISOLONE ACETATE 10 MG/ML
1 SUSPENSION/ DROPS OPHTHALMIC 3 TIMES DAILY
Qty: 5 ML | Refills: 1 | Status: SHIPPED | OUTPATIENT
Start: 2024-10-11 | End: 2024-10-11 | Stop reason: SDUPTHER

## 2024-10-11 RX ORDER — POLYMYXIN B SULFATE AND TRIMETHOPRIM 1; 10000 MG/ML; [USP'U]/ML
1 SOLUTION OPHTHALMIC EVERY 6 HOURS
COMMUNITY
Start: 2024-09-25

## 2024-10-11 NOTE — PATIENT INSTRUCTIONS
PRED FORTE - SHAKE WELL - 3 TIMES A DAY FOR 2 WK THEN DECREASE TO   TWICE A DAY FOR 2 WK THEN DAILY UNTIL YOU SEE ME AGAIN

## 2024-10-28 ENCOUNTER — TELEPHONE (OUTPATIENT)
Dept: OPHTHALMOLOGY | Facility: CLINIC | Age: 54
End: 2024-10-28
Payer: COMMERCIAL

## 2024-10-31 RX ORDER — LOSARTAN POTASSIUM 100 MG/1
TABLET ORAL
Qty: 90 TABLET | Refills: 2 | Status: SHIPPED | OUTPATIENT
Start: 2024-10-31

## 2024-11-11 ENCOUNTER — OFFICE VISIT (OUTPATIENT)
Dept: OPHTHALMOLOGY | Facility: CLINIC | Age: 54
End: 2024-11-11
Payer: COMMERCIAL

## 2024-11-11 DIAGNOSIS — B02.33 HERPES ZOSTER KERATITIS OF LEFT EYE: Primary | ICD-10-CM

## 2024-11-11 PROCEDURE — 99213 OFFICE O/P EST LOW 20 MIN: CPT | Mod: S$GLB,,, | Performed by: OPHTHALMOLOGY

## 2024-11-11 PROCEDURE — G2211 COMPLEX E/M VISIT ADD ON: HCPCS | Mod: S$GLB,,, | Performed by: OPHTHALMOLOGY

## 2024-11-11 PROCEDURE — 3044F HG A1C LEVEL LT 7.0%: CPT | Mod: CPTII,S$GLB,, | Performed by: OPHTHALMOLOGY

## 2024-11-11 PROCEDURE — 1159F MED LIST DOCD IN RCRD: CPT | Mod: CPTII,S$GLB,, | Performed by: OPHTHALMOLOGY

## 2024-11-11 PROCEDURE — 99999 PR PBB SHADOW E&M-EST. PATIENT-LVL II: CPT | Mod: PBBFAC,,, | Performed by: OPHTHALMOLOGY

## 2024-11-11 PROCEDURE — 4010F ACE/ARB THERAPY RXD/TAKEN: CPT | Mod: CPTII,S$GLB,, | Performed by: OPHTHALMOLOGY

## 2024-11-11 NOTE — PROGRESS NOTES
HPI    Charito PT    VZV OS - started 8/22  HX Herpes zoster left v1     Eye meds:  PF BID OS    Patient is here today for herpes zoster keratitis OS follow up. States OS   is doing well and seems to be improving. No pain or discomfort.   Last edited by Linda Torres on 11/11/2024  9:42 AM.            Assessment /Plan     For exam results, see Encounter Report.    Herpes zoster keratitis of left eye                     VZV OS - started 8/22    Pt was on a tapering course of steroid drops at that time, and missed her f/up with optom.    the goal of keeping the shingles virus quiet in the eye is to stay on the lowest dose of steroids long term.    S/p shingles vaccine recently - 2nd shot a few wks ago.    Discussed log term use of steroids to prevent flare ups     Drops as below.  F/up me 3 mo, va/iop os           PRED FORTE - daily until you SEE ME AGAIN    Today's visit is associated with current and anticipated ongoing medical care related to this patient's single serious/complex condition (cornea). Follow up is to be continued indefinitely to monitor the condition.

## 2024-11-12 NOTE — PROGRESS NOTES
HPI    Charito PT    VZV OS - started 8/22    VA OS better but still not perfect. Unsure if it has resolved see crescent   shaped thing when looking in the mirror.  VA OD no changes for one year.  Here for follow up from visit at urgent care on 9/25/24.  HX Herpes zoster left v1   Eye meds: POLYTRIM TID OS  Last edited by Edie Mcneill MD on 10/11/2024 12:52 PM.        ROS    Positive for: Eyes  Negative for: Constitutional  Last edited by Edie Mcneill MD on 10/11/2024 11:08 AM.        Assessment /Plan     For exam results, see Encounter Report.    Herpes zoster keratitis of left eye    Other orders  -     Discontinue: prednisoLONE acetate (PRED FORTE) 1 % DrpS; Place 1 drop into the left eye 3 (three) times daily.  Dispense: 5 mL; Refill: 1  -     prednisoLONE acetate (PRED FORTE) 1 % DrpS; Place 1 drop into the left eye 3 (three) times daily.  Dispense: 5 mL; Refill: 1      VZV OS - started 8/22    Pt was on a tapering course of steroid drops at that time, and missed her f/up with optom.    I explained again that the goal of keeping the shingles virus quiet in the eye is to stay on the lowest dose of steroids long term.    S/p shingles vaccine recently - 2nd shot 1 week ago.    Discussed log term use of steroids to prevent flare ups     Drops as below.  F/up me 3-4 wks, va/iop os           PRED FORTE - SHAKE WELL - 3 TIMES A DAY FOR 2 WK THEN DECREASE TO   TWICE A DAY FOR 2 WK THEN DAILY UNTIL YOU SEE ME AGAIN    Today's visit is associated with current and anticipated ongoing medical care related to this patient's single serious/complex condition (cornea). Follow up is to be continued indefinitely to monitor the condition.          Maribel Ramirez

## 2024-11-19 NOTE — PROGRESS NOTES
Physical Therapy Treatment Note     Name: Ruth Harper  Clinic Number: [id number]    Therapy Diagnosis:   Encounter Diagnoses   Name Primary?    Decreased strength     Decreased range of left hip movement     Posture abnormality Yes    Ischial bursitis of left side      Physician: Tunde Venegas,*    Visit Date: 6/21/2022    Physician Orders: PT Eval and Treat   Medical Diagnosis from Referral: M70.72 (ICD-10-CM) - Ischial bursitis of left side  Evaluation Date: 6/6/2022  Authorization Period Expiration:12/31/2022  Plan of Care Expiration: 8/1/2022  Visit # / Visits authorized: 3/24     Time In: 1000a  Time Out: 1055a  Total Appointment Time (timed & untimed codes): 55 minutes     Precautions: Standard     Initial FOTO: 33% (20% predicted)   FOTO 1st F/U:   FOTO 2nd F/U:   Subjective     Pt reports: she feels better after treatment but it comes back the days after. Pt states she does not do all her exercises.     She was compliant with home exercise program.  Response to previous treatment: n/a  Functional change: ongoing    Pain: 2/10  Location: left buttocks      Objective     Assessment (6/9):    Lumbar segmental mobility: decreased mobility L1-L3   Slump: (-) full ext and DF    HS length: WNL B     Ruth received therapeutic exercises to develop strength, endurance and ROM for 35 minutes including:     Quadruped Thoraci Ext and Rot 2 x 10 B   Tripod Hip Ext 2 x 10 B   Seated Nerve Glides in slump position 2 X 20   Supine Bridges 3 x 10   SL leg press 50# 3 x 10 B   Standing Paloff press 2 x 15 BTB B   Hip hinging from mat 2 x 15   SL Hip Abd 2 x 10   Education - pain-free nerve sliders / lifting mechanics      Ruth received the following manual therapy techniques: Joint mobilizations were applied to the: hip/thoracic for 15 minutes, including:     Pt agreeable to manipulation      Long axis hip distraction manipulation   Prone thoracic manipulation to multiple levels  Lumbar gapping mob;  L2-L4   Lumbar flexion mob L1-L3    Ruth participated in neuromuscular re-education activities to improve: Coordination, Kinesthetic, Sense and Proprioception for 5 minutes. The following activities were included:    Quadruped UE 2 x 10 B         Home Exercises Provided and Patient Education Provided     Education provided:   - HEP   - mobility exercises 2x/day   - no tensioners.; pain-free nerve mobility     Written Home Exercises Provided: yes.  Exercises were reviewed and Ruth was able to demonstrate them prior to the end of the session.  Ruth demonstrated good  understanding of the education provided.     See EMR under Patient Instructions for exercises provided 6/9/2022.    Assessment   Ruth had a (-) slump today. Pt reminded to continue HEP for long-term symptom management.     Ruth Is progressing well towards her goals.   Pt prognosis is Good.     Pt will continue to benefit from skilled outpatient physical therapy to address the deficits listed in the problem list box on initial evaluation, provide pt/family education and to maximize pt's level of independence in the home and community environment.     Pt's spiritual, cultural and educational needs considered and pt agreeable to plan of care and goals.     Anticipated barriers to physical therapy: none    Goals:  GOALS: Short Term Goals:  4 weeks  1.Report decreased knee pain  < / =  2/10  to increase tolerance for work and running activities  2. Pt will demonstrate a (-) Slump test to demonstrate decreased neural mechanosensivity.  3. Increase strength by 1/3 MMT grade in quad strength  to increase tolerance for ADL and work activities.  4. 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   3.Increase strength to >/=  4+/5 in quad/hip  to increase tolerance for ADL and work activities.  4. Pt will report a 20% impaired on FOTO hip to demonstrate increase in LE function with every day tasks.      Plan   Plan of care Certification: 6/6/2022 to 8/6/2022.     Assess deep core activation     Manuel Deras, PT        Adult

## 2025-01-30 RX ORDER — PREDNISOLONE ACETATE 10 MG/ML
1 SUSPENSION/ DROPS OPHTHALMIC DAILY
Qty: 5 ML | Refills: 3 | Status: SHIPPED | OUTPATIENT
Start: 2025-01-30 | End: 2026-01-30

## 2025-04-07 ENCOUNTER — OFFICE VISIT (OUTPATIENT)
Dept: OPHTHALMOLOGY | Facility: CLINIC | Age: 55
End: 2025-04-07
Payer: COMMERCIAL

## 2025-04-07 DIAGNOSIS — B02.33 HERPES ZOSTER KERATITIS OF LEFT EYE: Primary | ICD-10-CM

## 2025-04-07 PROCEDURE — 99999 PR PBB SHADOW E&M-EST. PATIENT-LVL II: CPT | Mod: PBBFAC,,, | Performed by: OPHTHALMOLOGY

## 2025-04-07 PROCEDURE — 1159F MED LIST DOCD IN RCRD: CPT | Mod: CPTII,S$GLB,, | Performed by: OPHTHALMOLOGY

## 2025-04-07 PROCEDURE — 99213 OFFICE O/P EST LOW 20 MIN: CPT | Mod: S$GLB,,, | Performed by: OPHTHALMOLOGY

## 2025-04-07 PROCEDURE — G2211 COMPLEX E/M VISIT ADD ON: HCPCS | Mod: S$GLB,,, | Performed by: OPHTHALMOLOGY

## 2025-04-07 PROCEDURE — 4010F ACE/ARB THERAPY RXD/TAKEN: CPT | Mod: CPTII,S$GLB,, | Performed by: OPHTHALMOLOGY

## 2025-04-07 NOTE — PROGRESS NOTES
FLAQUITO Mcneill PT    VZV OS - started 8/22  HX Herpes zoster left v1     Eye meds:  PF QD' OS    Patient is here today for herpes zoster keratitis OS follow up. Pt states   OS is doing well. No pain or discomfort.   Last edited by Edie Mcneill MD on 4/21/2025  5:01 PM.            Assessment /Plan     For exam results, see Encounter Report.    Herpes zoster keratitis of left eye    Other orders  -     fluorometholone 0.25% (FML) 0.25 % DrpS; Place 1 drop into the left eye once daily.  Dispense: 5 mL; Refill: 11        VZV OS - started 8/22    Pt was on a tapering course of steroid drops at that time, and missed her f/up with optom.    the goal of keeping the shingles virus quiet in the eye is to stay on the lowest dose of steroids long term.    S/p shingles vaccine recently - 2nd shot a few wks ago.    Discussed log term use of steroids to prevent flare ups     Quiet on PF daily -- will change to FML daily.              Today's visit is associated with current and anticipated ongoing medical care related to this patient's single serious/complex condition (cornea). Follow up is to be continued indefinitely to monitor the condition.

## 2025-05-09 ENCOUNTER — TELEPHONE (OUTPATIENT)
Dept: INTERNAL MEDICINE | Facility: CLINIC | Age: 55
End: 2025-05-09
Payer: COMMERCIAL

## 2025-05-09 DIAGNOSIS — M54.50 ACUTE LOW BACK PAIN, UNSPECIFIED BACK PAIN LATERALITY, UNSPECIFIED WHETHER SCIATICA PRESENT: Primary | ICD-10-CM

## 2025-05-09 NOTE — TELEPHONE ENCOUNTER
----- Message from Marie sent at 5/9/2025  2:49 PM CDT -----  Contact: [phone]  patient  Patient would like to get a referral.Referral to what specialty:  Orthopedic Does the patient want the referral with a specific physician:  noIs the specialist an [facility] or [facility]:  no Reason (be specific):  [place] Does the patient already have the specialty clinic appointment scheduled:  noIf yes, what date is the appointment scheduled:   Is the insurance listed in Epic correct? (this is important for a referral):  yesAdvised patient that once provider approves this either a nurse or  will return their call?: yesWould the patient like a call back, or a response through their [facility] portal?:   call Comments:

## 2025-05-12 ENCOUNTER — TELEPHONE (OUTPATIENT)
Dept: INTERNAL MEDICINE | Facility: CLINIC | Age: 55
End: 2025-05-12
Payer: COMMERCIAL

## 2025-05-12 ENCOUNTER — OFFICE VISIT (OUTPATIENT)
Dept: URGENT CARE | Facility: CLINIC | Age: 55
End: 2025-05-12
Payer: COMMERCIAL

## 2025-05-12 VITALS
HEIGHT: 65 IN | RESPIRATION RATE: 20 BRPM | HEART RATE: 73 BPM | TEMPERATURE: 99 F | WEIGHT: 180 LBS | BODY MASS INDEX: 29.99 KG/M2 | DIASTOLIC BLOOD PRESSURE: 93 MMHG | SYSTOLIC BLOOD PRESSURE: 156 MMHG | OXYGEN SATURATION: 98 %

## 2025-05-12 DIAGNOSIS — M51.369 DEGENERATION OF INTERVERTEBRAL DISC OF LUMBAR REGION, UNSPECIFIED WHETHER PAIN PRESENT: Primary | ICD-10-CM

## 2025-05-12 DIAGNOSIS — J34.89 SINUS PRESSURE: Primary | ICD-10-CM

## 2025-05-12 DIAGNOSIS — M54.50 ACUTE LOW BACK PAIN, UNSPECIFIED BACK PAIN LATERALITY, UNSPECIFIED WHETHER SCIATICA PRESENT: Primary | ICD-10-CM

## 2025-05-12 LAB
CTP QC/QA: YES
CTP QC/QA: YES
MOLECULAR STREP A: NEGATIVE
SARS-COV+SARS-COV-2 AG RESP QL IA.RAPID: NEGATIVE

## 2025-05-12 PROCEDURE — 99213 OFFICE O/P EST LOW 20 MIN: CPT | Mod: S$GLB,,, | Performed by: NURSE PRACTITIONER

## 2025-05-12 PROCEDURE — 87651 STREP A DNA AMP PROBE: CPT | Mod: QW,S$GLB,, | Performed by: NURSE PRACTITIONER

## 2025-05-12 PROCEDURE — 87811 SARS-COV-2 COVID19 W/OPTIC: CPT | Mod: QW,S$GLB,, | Performed by: NURSE PRACTITIONER

## 2025-05-12 NOTE — PATIENT INSTRUCTIONS
"Sinus pressure  -     SARS Coronavirus 2 Antigen, POCT Manual Read  -     POCT Strep A, Molecular      Viral URI (upper respiratory infection):    Your symptoms are viral in nature.  Viral upper respiratory infections typically run their course in 7-10 days.     - Rest at home.     - Drink plenty of fluids so you won't get dehydrated.      Avoid taking Decongestants such as pseudoephedrine (ex. Sudafed) or phenylephrine (ex. Mucinex FastMax, Dayquil, Nyquil, or any combo cold meds that say "cold," "sinus" or "-D").        - Cough recommendations:   Warm tea with honey can help with cough. Honey is a natural cough suppressant.  - Dextromethorphan (DM) is a cough suppressant over the counter (ie. mucinex DM OR delsym).        - Congestion recommendations:      - Mucinex (guaifenesin) twice a day (or as directed) to help loosen mucous.       - Fever/Pain recommendations:  Alternate Tylenol or Ibuprofen as directed for fever/pain.   - Motrin/Ibuprofen every 6-8 hours for pain and inflammation. Do not take ibuprofen if you have a history of GI bleeding, kidney disease, or if you take blood thinners.    - Tylenol/acetaminophen every 6-8 hours for added pain relief.  Avoid tylenol if you have a history of liver disease.       -Sore throat recommendations: Warm fluids, warm salt water gargles, throat lozenges, tea, honey, soup, or drinking something cold or frozen.  Throat lozenges or sprays help reduce pain. Gargling with warm saltwater (1/4 teaspoon of salt in 1/2 cup of warm water) or an OTC anesthetic gargle may be useful for irritation.    When to seek medical advice  Call your healthcare provider right away if any of these occur:  Fever that is poorly controlled with OTC fever reducing medication  New or worsening ear pain, sinus pain, or headache  Stiff neck  You can't swallow liquids or you can't open your mouth wide because of throat pain  Signs of dehydration. These include very dark urine or no urine, sunken " eyes, and dizziness.  Trouble breathing or noisy breathing  Muffled voice  Rash     If your symptoms worsen or fail to improve you should go to Emergency Department.

## 2025-05-12 NOTE — TELEPHONE ENCOUNTER
----- Message from Solyndra sent at 5/9/2025  4:12 PM CDT -----  Orthopedics first appointment is not until July. Pt scheduled an appointment with the back and spine doctor. Is it possible to change the referral to back and spine so I can attach the referral to her appt?  
Is it possible to change the referral to back and spine so I can attach the referral to her appt?     
Referral to back and spine has been ordered.  
Normal for race

## 2025-05-12 NOTE — PROGRESS NOTES
"Subjective:      Patient ID: Ruth Harper is a 54 y.o. female.    Vitals:  height is 5' 5" (1.651 m) and weight is 81.6 kg (180 lb). Her oral temperature is 98.7 °F (37.1 °C). Her blood pressure is 156/93 (abnormal) and her pulse is 73. Her respiration is 20 and oxygen saturation is 98%.     Chief Complaint: Sinus Problem    This is a 54 y.o. female who presents today with a chief complaint of nasal congestion, post nasal drip, headaches, sore throat, ears are congested that began yesterday.  Pt has taken OTC Sinus/Allergy medication to help with symptoms.   Pt declined swabbing during triage, states that she suffers from sinus infection around this time of year and needs a shot to make it go away.     Sinus Problem  This is a new problem. The current episode started yesterday. The problem has been gradually worsening since onset. There has been no fever. Associated symptoms include congestion, coughing, headaches, a hoarse voice, sinus pressure and a sore throat. Pertinent negatives include no chills, diaphoresis, ear pain, neck pain, shortness of breath, sneezing or swollen glands. Treatments tried: OTC Sinus/Allergy medication. The treatment provided mild relief.       Constitution: Negative for chills, sweating, fatigue and fever.   HENT:  Positive for congestion, postnasal drip, sinus pressure and sore throat. Negative for ear pain and sinus pain.    Neck: Negative for neck pain.   Cardiovascular:  Negative for chest pain.   Respiratory:  Positive for cough and sputum production. Negative for shortness of breath.    Gastrointestinal:  Negative for nausea, vomiting and diarrhea.   Musculoskeletal:  Negative for muscle ache.   Allergic/Immunologic: Negative for sneezing.   Neurological:  Positive for headaches.      Objective:     Physical Exam   Constitutional: She is oriented to person, place, and time.   HENT:   Head: Normocephalic.   Ears:   Right Ear: Hearing and external ear normal. No no drainage, " "swelling or tenderness. Tympanic membrane is not erythematous, not retracted and not bulging. No middle ear effusion.   Left Ear: Hearing and external ear normal. No no drainage, swelling or tenderness. Tympanic membrane is not erythematous, not retracted and not bulging.  No middle ear effusion.   Nose: Nose normal. No mucosal edema, rhinorrhea or purulent discharge. Right sinus exhibits no maxillary sinus tenderness and no frontal sinus tenderness. Left sinus exhibits no maxillary sinus tenderness and no frontal sinus tenderness.   Mouth/Throat: Uvula is midline and mucous membranes are normal. No trismus in the jaw. No uvula swelling. Posterior oropharyngeal erythema present. No oropharyngeal exudate or posterior oropharyngeal edema.   Cardiovascular: Normal rate and regular rhythm.   Pulmonary/Chest: Effort normal and breath sounds normal.   Neurological: She is alert and oriented to person, place, and time.   Skin: Skin is warm and dry.   Nursing note and vitals reviewed.      Assessment:     1. Sinus pressure        Plan:       Sinus pressure  -     SARS Coronavirus 2 Antigen, POCT Manual Read  -     POCT Strep A, Molecular      Patient Instructions   Sinus pressure  -     SARS Coronavirus 2 Antigen, POCT Manual Read  -     POCT Strep A, Molecular      Viral URI (upper respiratory infection):    Your symptoms are viral in nature.  Viral upper respiratory infections typically run their course in 7-10 days.     - Rest at home.     - Drink plenty of fluids so you won't get dehydrated.      Avoid taking Decongestants such as pseudoephedrine (ex. Sudafed) or phenylephrine (ex. Mucinex FastMax, Dayquil, Nyquil, or any combo cold meds that say "cold," "sinus" or "-D").        - Cough recommendations:   Warm tea with honey can help with cough. Honey is a natural cough suppressant.  - Dextromethorphan (DM) is a cough suppressant over the counter (ie. mucinex DM OR delsym).        - Congestion recommendations:      - " Mucinex (guaifenesin) twice a day (or as directed) to help loosen mucous.       - Fever/Pain recommendations:  Alternate Tylenol or Ibuprofen as directed for fever/pain.   - Motrin/Ibuprofen every 6-8 hours for pain and inflammation. Do not take ibuprofen if you have a history of GI bleeding, kidney disease, or if you take blood thinners.    - Tylenol/acetaminophen every 6-8 hours for added pain relief.  Avoid tylenol if you have a history of liver disease.       -Sore throat recommendations: Warm fluids, warm salt water gargles, throat lozenges, tea, honey, soup, or drinking something cold or frozen.  Throat lozenges or sprays help reduce pain. Gargling with warm saltwater (1/4 teaspoon of salt in 1/2 cup of warm water) or an OTC anesthetic gargle may be useful for irritation.    When to seek medical advice  Call your healthcare provider right away if any of these occur:  Fever that is poorly controlled with OTC fever reducing medication  New or worsening ear pain, sinus pain, or headache  Stiff neck  You can't swallow liquids or you can't open your mouth wide because of throat pain  Signs of dehydration. These include very dark urine or no urine, sunken eyes, and dizziness.  Trouble breathing or noisy breathing  Muffled voice  Rash     If your symptoms worsen or fail to improve you should go to Emergency Department.

## 2025-05-14 ENCOUNTER — HOSPITAL ENCOUNTER (OUTPATIENT)
Dept: RADIOLOGY | Facility: HOSPITAL | Age: 55
Discharge: HOME | End: 2025-05-14
Attending: ORTHOPAEDIC SURGERY
Payer: COMMERCIAL

## 2025-05-14 ENCOUNTER — OFFICE VISIT (OUTPATIENT)
Dept: ORTHOPEDICS | Facility: CLINIC | Age: 55
End: 2025-05-14
Payer: COMMERCIAL

## 2025-05-14 VITALS — WEIGHT: 179.88 LBS | BODY MASS INDEX: 29.97 KG/M2 | HEIGHT: 65 IN

## 2025-05-14 DIAGNOSIS — M51.369 DEGENERATION OF INTERVERTEBRAL DISC OF LUMBAR REGION, UNSPECIFIED WHETHER PAIN PRESENT: ICD-10-CM

## 2025-05-14 DIAGNOSIS — M54.50 ACUTE LOW BACK PAIN, UNSPECIFIED BACK PAIN LATERALITY, UNSPECIFIED WHETHER SCIATICA PRESENT: ICD-10-CM

## 2025-05-14 PROCEDURE — 99204 OFFICE O/P NEW MOD 45 MIN: CPT | Mod: S$GLB,,, | Performed by: ORTHOPAEDIC SURGERY

## 2025-05-14 PROCEDURE — 99999 PR PBB SHADOW E&M-EST. PATIENT-LVL III: CPT | Mod: PBBFAC,,, | Performed by: ORTHOPAEDIC SURGERY

## 2025-05-14 PROCEDURE — 3008F BODY MASS INDEX DOCD: CPT | Mod: CPTII,S$GLB,, | Performed by: ORTHOPAEDIC SURGERY

## 2025-05-14 PROCEDURE — 1159F MED LIST DOCD IN RCRD: CPT | Mod: CPTII,S$GLB,, | Performed by: ORTHOPAEDIC SURGERY

## 2025-05-14 PROCEDURE — 72110 X-RAY EXAM L-2 SPINE 4/>VWS: CPT | Mod: TC

## 2025-05-14 PROCEDURE — 4010F ACE/ARB THERAPY RXD/TAKEN: CPT | Mod: CPTII,S$GLB,, | Performed by: ORTHOPAEDIC SURGERY

## 2025-05-14 RX ORDER — METHYLPREDNISOLONE 4 MG/1
TABLET ORAL
Qty: 1 EACH | Refills: 0 | Status: SHIPPED | OUTPATIENT
Start: 2025-05-14 | End: 2025-06-04

## 2025-05-14 RX ORDER — MELOXICAM 15 MG/1
15 TABLET ORAL DAILY
Qty: 30 TABLET | Refills: 0 | Status: SHIPPED | OUTPATIENT
Start: 2025-05-14

## 2025-05-14 RX ORDER — METHOCARBAMOL 750 MG/1
750 TABLET, FILM COATED ORAL NIGHTLY PRN
Qty: 30 TABLET | Refills: 0 | Status: SHIPPED | OUTPATIENT
Start: 2025-05-14 | End: 2025-06-13

## 2025-05-14 NOTE — PROGRESS NOTES
DATE: 5/14/2025  PATIENT: Ruth Harper    Supervising Physician: Bertrand Rice M.D.    CHIEF COMPLAINT: low back pain    HISTORY:  Ruth Harper is a 54 y.o. female with PMH of HTN and hyperlipidemia here for initial evaluation of low back pain (Back - 3, Leg - 0). The pain in the lower back is what bothers her most. The pain has been present for 2 weeks. Denies recent fall. States that she was lifting boxes and may have twisted incorrectly. The patient describes the pain as intermittent aching that radiates to tailbone. The pain is worse with turning, movement, and bending and improved with HEP learned during previous PT sessions. There is no associated numbness and tingling. There is no subjective weakness. Prior treatments have included HEP, ibuprofen, aleve, and Advil, but no CHILO, PO steroids, or muscle relaxants.    The patient denies myelopathic symptoms such as handwriting changes or difficulty with buttons/coins/keys. Denies perineal paresthesias, bowel/bladder dysfunction.    Reports hx of DDD of L5-S1 with lumbar radiculopathy. Last seen by Dr. Malone in 2022.      PAST MEDICAL/SURGICAL HISTORY:  Past Medical History:   Diagnosis Date    AR (allergic rhinitis)     HTN (hypertension)     Hyperlipidemia      Past Surgical History:   Procedure Laterality Date    none         Medications:   Medications Ordered Prior to Encounter[1]    Social History: Social History[2]    REVIEW OF SYSTEMS:  Constitution: Negative. Negative for chills, fever and night sweats.   Cardiovascular: Negative for chest pain and syncope.   Respiratory: Negative for cough and shortness of breath.   Gastrointestinal: See HPI. Negative for nausea/vomiting. Negative for abdominal pain.  Genitourinary: See HPI. Negative for discoloration or dysuria.  Skin: Negative for dry skin, itching and rash.   Hematologic/Lymphatic: Negative for bleeding problem. Does not bruise/bleed easily.   Musculoskeletal: Negative for falls and muscle  "weakness.   Neurological: See HPI. No seizures.   Endocrine: Negative for polydipsia, polyphagia and polyuria.   Allergic/Immunologic: Negative for hives and persistent infections.     EXAM:  Ht 5' 5" (1.651 m)   Wt 81.6 kg (179 lb 14.3 oz)   BMI 29.94 kg/m²     General: The patient is a 54 y.o. female in no apparent distress, the patient is oriented to person, place and time.  Psych: Normal mood and affect  HEENT: Vision grossly intact, hearing intact to the spoken word.  Lungs: Respirations unlabored.  Gait: Normal station and gait, no difficulty with toe or heel walk.   Skin: Dorsal lumbar skin negative for rashes, lesions, hairy patches and surgical scars. There is no lumbar tenderness to palpation.  Range of motion: Lumbar range of motion is acceptable.  Spinal Balance: Global saggital and coronal spinal balance acceptable, not significant for scoliosis and kyphosis.  Musculoskeletal: No pain with the range of motion of the bilateral hips. No trochanteric tenderness to palpation.  Vascular: Bilateral lower extremities warm and well perfused, dorsalis pedis pulses 2+ bilaterally.  Neurological: Normal strength and tone in all major motor groups in the bilateral lower extremities. Normal sensation to light touch in the L2-S1 dermatomes bilaterally.  Deep tendon reflexes symmetric in the bilateral lower extremities.  Negative Babinski bilaterally. Straight leg raise negative bilaterally.    IMAGING:      Today I personally reviewed AP, Lat and Flex/Ex  upright L-spine films that demonstrate moderate degenerative changes at L5-S1    MRI lumbar (2023) demonstrates mild disc bulge at L5-S1      Body mass index is 29.94 kg/m².    Hemoglobin A1C   Date Value Ref Range Status   06/25/2024 5.5 4.0 - 5.6 % Final     Comment:     ADA Screening Guidelines:  5.7-6.4%  Consistent with prediabetes  >or=6.5%  Consistent with diabetes    High levels of fetal hemoglobin interfere with the HbA1C  assay. Heterozygous hemoglobin " variants (HbS, HgC, etc)do  not significantly interfere with this assay.   However, presence of multiple variants may affect accuracy.     05/16/2023 5.5 4.0 - 5.6 % Final     Comment:     ADA Screening Guidelines:  5.7-6.4%  Consistent with prediabetes  >or=6.5%  Consistent with diabetes    High levels of fetal hemoglobin interfere with the HbA1C  assay. Heterozygous hemoglobin variants (HbS, HgC, etc)do  not significantly interfere with this assay.   However, presence of multiple variants may affect accuracy.     05/11/2022 5.7 (H) 4.0 - 5.6 % Final     Comment:     ADA Screening Guidelines:  5.7-6.4%  Consistent with prediabetes  >or=6.5%  Consistent with diabetes    High levels of fetal hemoglobin interfere with the HbA1C  assay. Heterozygous hemoglobin variants (HbS, HgC, etc)do  not significantly interfere with this assay.   However, presence of multiple variants may affect accuracy.         ASSESSMENT/PLAN:    Ruth was seen today for low-back pain.    Diagnoses and all orders for this visit:    Acute low back pain, unspecified back pain laterality, unspecified whether sciatica present  -     Ambulatory referral/consult to Orthopedics  -     Ambulatory referral/consult to Spine Care    Other orders  -     methylPREDNISolone (MEDROL DOSEPACK) 4 mg tablet; use as directed  -     meloxicam (MOBIC) 15 MG tablet; Take 1 tablet (15 mg total) by mouth once daily.  -     methocarbamoL (ROBAXIN) 750 MG Tab; Take 1 tablet (750 mg total) by mouth nightly as needed.      I made the decision to obtain old records of the patient including previous notes and imaging. New imaging was ordered today of the extremity or extremities evaluated. I independently reviewed and interpreted the radiographs and/or MRIs today as well as prior imaging. Reviewed imaging in detail with patient.     We discussed at length different treatment options including conservative vs surgical management. These include anti-inflammatories,  acetaminophen, rest, ice, heat, physical therapy including strengthening and stretching exercises, home exercises, ROM, aerobic conditioning, aqua therapy, other modalities including ultrasound, massage, and dry needling, epidural steroid injections and finally surgical intervention.      Patient would like to proceed with a trial of conservative management:    Medications:  Prescription for Meloxicam 15 mg once daily, methylprednisolone dose pack, and methocarbamol 750 mg nightly provided today for pain management. Patient understands to take NSAIDs with food and/or OTC prilosec to decrease GI side effects. Advised patient to refrain from taking other anti-inflammatory medications while taking this medication, however she may alternate with acetaminophen as needed.  Physical Therapy: Ambulatory referral to physical therapy deferred at this time. Patient will contact office if back pain continues after 1 week for PT referral.  HEP:  Continue AAOS spine conditioning HEP. The patient demonstrated understanding of exercises and proper technique.      Follow up: Complete 7 day course of Meloxicam, methylprednisolone, and methocarbamol. Notify office of any continued pain upon completion and RTC as needed for worsening pain.    All questions were answered and patient is agreeable to the above plan.          [1]   Current Outpatient Medications on File Prior to Visit   Medication Sig Dispense Refill    ergocalciferol (ERGOCALCIFEROL) 50,000 unit Cap TAKE 1 CAPSULE BY MOUTH EVERY 7 DAYS 12 capsule 3    fluorometholone 0.25% (FML) 0.25 % DrpS Place 1 drop into the left eye once daily. 5 mL 11    fluticasone propionate (FLONASE) 50 mcg/actuation nasal spray 1 spray (50 mcg total) by Each Nostril route once daily. 16 g 5    losartan (COZAAR) 100 MG tablet TAKE 1 TABLET(100 MG) BY MOUTH EVERY DAY 90 tablet 2    multivitamin (THERAGRAN) per tablet Take 1 tablet by mouth once daily.      prednisoLONE acetate (PRED FORTE) 1 %  DrpS Place 1 drop into the left eye once daily. 5 mL 3     No current facility-administered medications on file prior to visit.   [2]   Social History  Socioeconomic History    Marital status:    Tobacco Use    Smoking status: Never     Passive exposure: Never    Smokeless tobacco: Never   Substance and Sexual Activity    Alcohol use: Yes     Comment: social    Drug use: No     Social Drivers of Health     Financial Resource Strain: Low Risk  (5/14/2025)    Overall Financial Resource Strain (CARDIA)     Difficulty of Paying Living Expenses: Not hard at all   Food Insecurity: No Food Insecurity (5/14/2025)    Hunger Vital Sign     Worried About Running Out of Food in the Last Year: Never true     Ran Out of Food in the Last Year: Never true   Transportation Needs: No Transportation Needs (5/14/2025)    PRAPARE - Transportation     Lack of Transportation (Medical): No     Lack of Transportation (Non-Medical): No   Physical Activity: Insufficiently Active (5/14/2025)    Exercise Vital Sign     Days of Exercise per Week: 2 days     Minutes of Exercise per Session: 20 min   Stress: No Stress Concern Present (5/14/2025)    [employer] - Occupational Stress Questionnaire     Feeling of Stress : Only a little   Housing Stability: Low Risk  (5/14/2025)    Housing Stability Vital Sign     Unable to Pay for Housing in the Last Year: No     Number of Times Moved in the Last Year: 1     Homeless in the Last Year: No

## 2025-05-22 DIAGNOSIS — M54.50 ACUTE LOW BACK PAIN, UNSPECIFIED BACK PAIN LATERALITY, UNSPECIFIED WHETHER SCIATICA PRESENT: Primary | ICD-10-CM
